# Patient Record
Sex: FEMALE | Race: WHITE | NOT HISPANIC OR LATINO | Employment: PART TIME | URBAN - METROPOLITAN AREA
[De-identification: names, ages, dates, MRNs, and addresses within clinical notes are randomized per-mention and may not be internally consistent; named-entity substitution may affect disease eponyms.]

---

## 2016-02-04 LAB
CREAT SERPL-MCNC: 0.87 MG/DL (ref 0.6–1.1)
GFR SERPL CREATININE-BSD FRML MDRD: 86 ML/MIN/1.73
GLUCOSE SERPL-MCNC: 90 MG/DL (ref 70–99)
POTASSIUM SERPL-SCNC: 4.2 MMOL/L (ref 3.5–5.1)

## 2017-02-14 LAB
PAP SMEAR - HIM PATIENT REPORTED: NEGATIVE
TSH SERPL-ACNC: 0.62 ULU/ML (ref 0.45–4.5)

## 2017-02-28 ENCOUNTER — TRANSFERRED RECORDS (OUTPATIENT)
Dept: HEALTH INFORMATION MANAGEMENT | Facility: CLINIC | Age: 37
End: 2017-02-28

## 2017-03-08 RX ORDER — BUTALBITAL, ACETAMINOPHEN, CAFFEINE AND CODEINE PHOSPHATE 300; 50; 40; 30 MG/1; MG/1; MG/1; MG/1
1-2 CAPSULE ORAL EVERY 4 HOURS PRN
Status: ON HOLD | COMMUNITY
End: 2017-03-09

## 2017-03-09 ENCOUNTER — HOSPITAL ENCOUNTER (OUTPATIENT)
Facility: CLINIC | Age: 37
Discharge: HOME | End: 2017-03-09
Attending: OBSTETRICS & GYNECOLOGY | Admitting: OBSTETRICS & GYNECOLOGY
Payer: COMMERCIAL

## 2017-03-09 ENCOUNTER — SURGERY (OUTPATIENT)
Age: 37
End: 2017-03-09

## 2017-03-09 ENCOUNTER — ANESTHESIA (OUTPATIENT)
Dept: SURGERY | Facility: CLINIC | Age: 37
End: 2017-03-09
Payer: COMMERCIAL

## 2017-03-09 ENCOUNTER — ANESTHESIA EVENT (OUTPATIENT)
Dept: SURGERY | Facility: CLINIC | Age: 37
End: 2017-03-09
Payer: COMMERCIAL

## 2017-03-09 VITALS
RESPIRATION RATE: 14 BRPM | DIASTOLIC BLOOD PRESSURE: 82 MMHG | HEART RATE: 77 BPM | WEIGHT: 166.1 LBS | BODY MASS INDEX: 25.17 KG/M2 | TEMPERATURE: 98.1 F | HEIGHT: 68 IN | OXYGEN SATURATION: 94 % | SYSTOLIC BLOOD PRESSURE: 129 MMHG

## 2017-03-09 DIAGNOSIS — Z98.890 STATUS POST HYSTEROSCOPIC ABLATION OF ENDOMETRIUM: Primary | ICD-10-CM

## 2017-03-09 LAB — HCG SERPL QL: NEGATIVE

## 2017-03-09 PROCEDURE — 25000132 ZZH RX MED GY IP 250 OP 250 PS 637: Performed by: OBSTETRICS & GYNECOLOGY

## 2017-03-09 PROCEDURE — 25000125 ZZHC RX 250: Performed by: ANESTHESIOLOGY

## 2017-03-09 PROCEDURE — 25000128 H RX IP 250 OP 636: Performed by: ANESTHESIOLOGY

## 2017-03-09 PROCEDURE — 88305 TISSUE EXAM BY PATHOLOGIST: CPT | Mod: 26 | Performed by: OBSTETRICS & GYNECOLOGY

## 2017-03-09 PROCEDURE — 25000566 ZZH SEVOFLURANE, EA 15 MIN: Performed by: OBSTETRICS & GYNECOLOGY

## 2017-03-09 PROCEDURE — 27210794 ZZH OR GENERAL SUPPLY STERILE: Performed by: OBSTETRICS & GYNECOLOGY

## 2017-03-09 PROCEDURE — 40000169 ZZH STATISTIC PRE-PROCEDURE ASSESSMENT I: Performed by: OBSTETRICS & GYNECOLOGY

## 2017-03-09 PROCEDURE — 25800025 ZZH RX 258: Performed by: ANESTHESIOLOGY

## 2017-03-09 PROCEDURE — 71000012 ZZH RECOVERY PHASE 1 LEVEL 1 FIRST HR: Performed by: OBSTETRICS & GYNECOLOGY

## 2017-03-09 PROCEDURE — 84703 CHORIONIC GONADOTROPIN ASSAY: CPT | Performed by: OBSTETRICS & GYNECOLOGY

## 2017-03-09 PROCEDURE — 25800025 ZZH RX 258: Performed by: OBSTETRICS & GYNECOLOGY

## 2017-03-09 PROCEDURE — 37000009 ZZH ANESTHESIA TECHNICAL FEE, EACH ADDTL 15 MIN: Performed by: OBSTETRICS & GYNECOLOGY

## 2017-03-09 PROCEDURE — 36415 COLL VENOUS BLD VENIPUNCTURE: CPT | Performed by: OBSTETRICS & GYNECOLOGY

## 2017-03-09 PROCEDURE — 88305 TISSUE EXAM BY PATHOLOGIST: CPT | Performed by: OBSTETRICS & GYNECOLOGY

## 2017-03-09 PROCEDURE — 71000027 ZZH RECOVERY PHASE 2 EACH 15 MINS: Performed by: OBSTETRICS & GYNECOLOGY

## 2017-03-09 PROCEDURE — 36000056 ZZH SURGERY LEVEL 3 1ST 30 MIN: Performed by: OBSTETRICS & GYNECOLOGY

## 2017-03-09 PROCEDURE — 71000013 ZZH RECOVERY PHASE 1 LEVEL 1 EA ADDTL HR: Performed by: OBSTETRICS & GYNECOLOGY

## 2017-03-09 PROCEDURE — 36000058 ZZH SURGERY LEVEL 3 EA 15 ADDTL MIN: Performed by: OBSTETRICS & GYNECOLOGY

## 2017-03-09 PROCEDURE — C1888 ENDOVAS NON-CARDIAC ABL CATH: HCPCS | Performed by: OBSTETRICS & GYNECOLOGY

## 2017-03-09 PROCEDURE — 37000008 ZZH ANESTHESIA TECHNICAL FEE, 1ST 30 MIN: Performed by: OBSTETRICS & GYNECOLOGY

## 2017-03-09 PROCEDURE — 25000125 ZZHC RX 250: Performed by: NURSE ANESTHETIST, CERTIFIED REGISTERED

## 2017-03-09 PROCEDURE — 25000128 H RX IP 250 OP 636: Performed by: NURSE ANESTHETIST, CERTIFIED REGISTERED

## 2017-03-09 RX ORDER — ONDANSETRON 4 MG/1
4 TABLET, ORALLY DISINTEGRATING ORAL EVERY 30 MIN PRN
Status: DISCONTINUED | OUTPATIENT
Start: 2017-03-09 | End: 2017-03-09 | Stop reason: HOSPADM

## 2017-03-09 RX ORDER — NALOXONE HYDROCHLORIDE 0.4 MG/ML
.1-.4 INJECTION, SOLUTION INTRAMUSCULAR; INTRAVENOUS; SUBCUTANEOUS
Status: DISCONTINUED | OUTPATIENT
Start: 2017-03-09 | End: 2017-03-09 | Stop reason: HOSPADM

## 2017-03-09 RX ORDER — ACETAMINOPHEN 325 MG/1
650 TABLET ORAL
Status: DISCONTINUED | OUTPATIENT
Start: 2017-03-09 | End: 2017-03-09 | Stop reason: HOSPADM

## 2017-03-09 RX ORDER — FENTANYL CITRATE 50 UG/ML
25-50 INJECTION, SOLUTION INTRAMUSCULAR; INTRAVENOUS
Status: DISCONTINUED | OUTPATIENT
Start: 2017-03-09 | End: 2017-03-09 | Stop reason: HOSPADM

## 2017-03-09 RX ORDER — PROPOFOL 10 MG/ML
INJECTION, EMULSION INTRAVENOUS PRN
Status: DISCONTINUED | OUTPATIENT
Start: 2017-03-09 | End: 2017-03-09

## 2017-03-09 RX ORDER — OXYCODONE HYDROCHLORIDE 5 MG/1
5-10 TABLET ORAL
Status: COMPLETED | OUTPATIENT
Start: 2017-03-09 | End: 2017-03-09

## 2017-03-09 RX ORDER — DEXAMETHASONE SODIUM PHOSPHATE 4 MG/ML
INJECTION, SOLUTION INTRA-ARTICULAR; INTRALESIONAL; INTRAMUSCULAR; INTRAVENOUS; SOFT TISSUE PRN
Status: DISCONTINUED | OUTPATIENT
Start: 2017-03-09 | End: 2017-03-09

## 2017-03-09 RX ORDER — HYDROMORPHONE HYDROCHLORIDE 1 MG/ML
.3-.5 INJECTION, SOLUTION INTRAMUSCULAR; INTRAVENOUS; SUBCUTANEOUS EVERY 10 MIN PRN
Status: DISCONTINUED | OUTPATIENT
Start: 2017-03-09 | End: 2017-03-09 | Stop reason: HOSPADM

## 2017-03-09 RX ORDER — MEPERIDINE HYDROCHLORIDE 25 MG/ML
12.5 INJECTION INTRAMUSCULAR; INTRAVENOUS; SUBCUTANEOUS
Status: DISCONTINUED | OUTPATIENT
Start: 2017-03-09 | End: 2017-03-09 | Stop reason: HOSPADM

## 2017-03-09 RX ORDER — PROPOFOL 10 MG/ML
INJECTION, EMULSION INTRAVENOUS CONTINUOUS PRN
Status: DISCONTINUED | OUTPATIENT
Start: 2017-03-09 | End: 2017-03-09

## 2017-03-09 RX ORDER — KETOROLAC TROMETHAMINE 30 MG/ML
INJECTION, SOLUTION INTRAMUSCULAR; INTRAVENOUS PRN
Status: DISCONTINUED | OUTPATIENT
Start: 2017-03-09 | End: 2017-03-09

## 2017-03-09 RX ORDER — SODIUM CHLORIDE, SODIUM LACTATE, POTASSIUM CHLORIDE, CALCIUM CHLORIDE 600; 310; 30; 20 MG/100ML; MG/100ML; MG/100ML; MG/100ML
INJECTION, SOLUTION INTRAVENOUS CONTINUOUS
Status: DISCONTINUED | OUTPATIENT
Start: 2017-03-09 | End: 2017-03-09 | Stop reason: HOSPADM

## 2017-03-09 RX ORDER — ONDANSETRON 2 MG/ML
INJECTION INTRAMUSCULAR; INTRAVENOUS PRN
Status: DISCONTINUED | OUTPATIENT
Start: 2017-03-09 | End: 2017-03-09

## 2017-03-09 RX ORDER — FENTANYL CITRATE 50 UG/ML
INJECTION, SOLUTION INTRAMUSCULAR; INTRAVENOUS PRN
Status: DISCONTINUED | OUTPATIENT
Start: 2017-03-09 | End: 2017-03-09

## 2017-03-09 RX ORDER — OXYCODONE HYDROCHLORIDE 5 MG/1
5-10 TABLET ORAL
Qty: 10 TABLET | Refills: 0 | Status: SHIPPED | OUTPATIENT
Start: 2017-03-09 | End: 2017-03-13

## 2017-03-09 RX ORDER — LIDOCAINE HYDROCHLORIDE 20 MG/ML
INJECTION, SOLUTION INFILTRATION; PERINEURAL PRN
Status: DISCONTINUED | OUTPATIENT
Start: 2017-03-09 | End: 2017-03-09

## 2017-03-09 RX ORDER — ONDANSETRON 2 MG/ML
4 INJECTION INTRAMUSCULAR; INTRAVENOUS EVERY 30 MIN PRN
Status: DISCONTINUED | OUTPATIENT
Start: 2017-03-09 | End: 2017-03-09 | Stop reason: HOSPADM

## 2017-03-09 RX ADMIN — SODIUM CHLORIDE, POTASSIUM CHLORIDE, SODIUM LACTATE AND CALCIUM CHLORIDE: 600; 310; 30; 20 INJECTION, SOLUTION INTRAVENOUS at 07:30

## 2017-03-09 RX ADMIN — LIDOCAINE HYDROCHLORIDE 60 MG: 20 INJECTION, SOLUTION INFILTRATION; PERINEURAL at 07:42

## 2017-03-09 RX ADMIN — MIDAZOLAM HYDROCHLORIDE 2 MG: 1 INJECTION, SOLUTION INTRAMUSCULAR; INTRAVENOUS at 07:31

## 2017-03-09 RX ADMIN — HYDROMORPHONE HYDROCHLORIDE 0.2 MG: 1 INJECTION, SOLUTION INTRAMUSCULAR; INTRAVENOUS; SUBCUTANEOUS at 09:07

## 2017-03-09 RX ADMIN — FENTANYL CITRATE 25 MCG: 50 INJECTION, SOLUTION INTRAMUSCULAR; INTRAVENOUS at 08:37

## 2017-03-09 RX ADMIN — HYDROMORPHONE HYDROCHLORIDE 0.5 MG: 1 INJECTION, SOLUTION INTRAMUSCULAR; INTRAVENOUS; SUBCUTANEOUS at 09:32

## 2017-03-09 RX ADMIN — DEXAMETHASONE SODIUM PHOSPHATE 4 MG: 4 INJECTION, SOLUTION INTRAMUSCULAR; INTRAVENOUS at 07:49

## 2017-03-09 RX ADMIN — SODIUM CHLORIDE 650 ML: 900 IRRIGANT IRRIGATION at 08:00

## 2017-03-09 RX ADMIN — FENTANYL CITRATE 25 MCG: 50 INJECTION, SOLUTION INTRAMUSCULAR; INTRAVENOUS at 07:37

## 2017-03-09 RX ADMIN — PROPOFOL 200 MG: 10 INJECTION, EMULSION INTRAVENOUS at 07:37

## 2017-03-09 RX ADMIN — PROPOFOL 200 MCG/KG/MIN: 10 INJECTION, EMULSION INTRAVENOUS at 07:40

## 2017-03-09 RX ADMIN — ONDANSETRON 4 MG: 2 INJECTION INTRAMUSCULAR; INTRAVENOUS at 07:55

## 2017-03-09 RX ADMIN — FENTANYL CITRATE 75 MCG: 50 INJECTION, SOLUTION INTRAMUSCULAR; INTRAVENOUS at 07:42

## 2017-03-09 RX ADMIN — OXYCODONE HYDROCHLORIDE 5 MG: 5 TABLET ORAL at 10:09

## 2017-03-09 RX ADMIN — PHENYLEPHRINE HYDROCHLORIDE 100 MCG: 10 INJECTION, SOLUTION INTRAMUSCULAR; INTRAVENOUS; SUBCUTANEOUS at 07:50

## 2017-03-09 RX ADMIN — HYDROMORPHONE HYDROCHLORIDE 0.5 MG: 1 INJECTION, SOLUTION INTRAMUSCULAR; INTRAVENOUS; SUBCUTANEOUS at 10:00

## 2017-03-09 RX ADMIN — FENTANYL CITRATE 50 MCG: 50 INJECTION, SOLUTION INTRAMUSCULAR; INTRAVENOUS at 08:45

## 2017-03-09 RX ADMIN — FENTANYL CITRATE 25 MCG: 50 INJECTION, SOLUTION INTRAMUSCULAR; INTRAVENOUS at 08:57

## 2017-03-09 RX ADMIN — HYDROMORPHONE HYDROCHLORIDE 0.3 MG: 1 INJECTION, SOLUTION INTRAMUSCULAR; INTRAVENOUS; SUBCUTANEOUS at 09:01

## 2017-03-09 RX ADMIN — LIDOCAINE HYDROCHLORIDE 40 MG: 20 INJECTION, SOLUTION INFILTRATION; PERINEURAL at 07:37

## 2017-03-09 RX ADMIN — KETOROLAC TROMETHAMINE 30 MG: 30 INJECTION, SOLUTION INTRAMUSCULAR at 08:08

## 2017-03-09 NOTE — DISCHARGE INSTRUCTIONS
While you were at the hospital today you received Toradol, an antiinflammatory medication similar to Ibuprofen.  You should not take other antiinflammatory medication, such as Ibuprofen, Motrin, Advil, Aleve, Naprosyn, etc, until 2pm.     Same Day Surgery Discharge Instructions for  Sedation and General Anesthesia       It's not unusual to feel dizzy, light-headed or faint for up to 24 hours after surgery or while taking pain medication.  If you have these symptoms: sit for a few minutes before standing and have someone assist you when you get up to walk or use the bathroom.      You should rest and relax for the next 24 hours. We recommend you make arrangements to have an adult stay with you for at least 24 hours after your discharge.  Avoid hazardous and strenuous activity.      DO NOT DRIVE any vehicle or operate mechanical equipment for 24 hours following the end of your surgery.  Even though you may feel normal, your reactions may be affected by the medication you have received.      Do not drink alcoholic beverages for 24 hours following surgery.       Slowly progress to your regular diet as you feel able. It's not unusual to feel nauseated and/or vomit after receiving anesthesia.  If you develop these symptoms, drink clear liquids (apple juice, ginger ale, broth, 7-up, etc. ) until you feel better.  If your nausea and vomiting persists for 24 hours, please notify your surgeon.        All narcotic pain medications, along with inactivity and anesthesia, can cause constipation. Drinking plenty of liquids and increasing fiber intake will help.      For any questions of a medical nature, call your surgeon.      Do not make important decisions for 24 hours.      If you had general anesthesia, you may have a sore throat for a couple of days related to the breathing tube used during surgery.  You may use Cepacol lozenges to help with this discomfort.  If it worsens or if you develop a fever, contact your surgeon.        If you feel your pain is not well managed with the pain medications prescribed by your surgeon, please contact your surgeon's office to let them know so they can address your concerns.       HOME CARE FOLLOWING HYSTEROSCOPY    Diet  You have no restrictions on your diet.  During the evening following surgery, drink plenty of fluids and eat a light supper.    Nausea  The anesthesia may produce some nausea.  If you feel nauseated, stay in bed and try drinking fluids such as 7-Up, tea, or soup.    Discomfort  The amount of discomfort you can expect is very unpredictable.  If you have pain that cannot be controlled with Tylenol or with the prescription you may have received, you should notify your physician.  Abdominal cramping or low backache is not uncommon and should not be a cause for concern.  You will be drowsy and weak the day of surgery and possibly the following day.  Fever  A low grade fever (not over 100 F) is usual after this procedure.  Do not hesitate to notify your physician if your fever seems excessive or persists.    Activity   You may resume your normal activity.  Avoid heavy lifting for one week.  You may bathe or shower.  Do not douche, use tampons, or resume intercourse until the bleeding has ceased.    Emergency Care  Contact your physician if you have any of these problems:   1.  A fever over 100 F   2.  A large amount of bleeding or drainage   3.  Severe pain       [facility]  D&C  Discharge Instructions    ACTIVITY:  You may restart normal activities as your abdominal discomfort disappears.  You may expect some discomfort under the ribs and shoulder area for the first 24 hours.  In nearly all cases, this will disappear shortly after the first day.  It is certainly permissible to climb stairs, shower, and do ordinary, quiet activities.  More vigorous activities such as sports, intercourse and work may be resumed in 48-72 hours as seems to befit your situation.    OFFICE  VISIT:  Please call a day or two after your surgery to make an appointment in approximately 2 weeks to discuss the results of your surgery and have your check-up.    VAGINAL DISCHARGE:  You may have some bloody vaginal discharge for as long as one week.  Ordinary tampons may be used after 3-4 days.  Avoid super absorbent tampons.    TEMPERATURE:  If you develop a temperature elevation of 101 F or higher, please call our office immediately.    RESTRICTIONS:  Due to the effects of general anesthesia, please do not drive a car, drink alcoholic beverages, nor operate complex machinery in the first 24 hours following surgery.    PLEASE FEEL FREE TO CALL OUR OFFICE IF ANY QUESTIONS OR PROBLEMS ARISE.    [facility]    Maximilian Little M.D.  Junior Shannon M.D.   Cedric Krishnamurthy M.D.  GAVIOTA Peña M.D.   Litzy Singh M.D.  Kasandra Shea M.D.  SUSAN Ramos M.D.   GAVIOTA Srivastava M.D.  _________________________________________________________________________________                   [address]  [address]            [facility] [address]  [place] [address]            [phone] (Telephone)                                               [phone] (Telephone)            [phone] (Fax)  [phone] (Fax)            [facility]      **If you have questions or concerns about your procedure,   call Dr. House at [phone]**

## 2017-03-09 NOTE — ANESTHESIA CARE TRANSFER NOTE
Patient: Vikki Merlos    Procedure(s):  HYSTEROSCOPY, DILATION AND CURETTAGE WITH NOVASURE ABLATION  - Wound Class: II-Clean Contaminated    Diagnosis: ABNORMAL UTERINE BLEEDING   Diagnosis Additional Information: No value filed.    Anesthesia Type:   General, LMA     Note:  Airway :Face Mask  Patient transferred to:PACU  Comments: Sleepy      Vitals: (Last set prior to Anesthesia Care Transfer)    CRNA VITALS  3/9/2017 0757 - 3/9/2017 0833      3/9/2017             Pulse: 77    Ht Rate: 73    SpO2: 100 %    Resp Rate (observed): 8                Electronically Signed By: FIONA Diamond CRNA  March 9, 2017  8:33 AM

## 2017-03-09 NOTE — IP AVS SNAPSHOT
MRN:[id number]                      After Visit Summary   3/9/2017    Vikki Merlos    MRN: [id number]           Thank you!     Thank you for choosing [facility] for your care. Our goal is always to provide you with excellent care. Hearing back from our patients is one way we can continue to improve our services. Please take a few minutes to complete the written survey that you may receive in the mail after you visit with us. Thank you!        Patient Information     Date Of Birth          1980        About your hospital stay     You were admitted on:  March 9, 2017 [facility]    You were discharged on:  March 9, 2017       Who to Call     For medical emergencies, please call 911.  For non-urgent questions about your medical care, please call your primary care provider or clinic, [phone]  For questions related to your surgery, please call your surgery clinic        Attending Provider     Provider Specialty    Caitlin House MD OB/Gyn       Primary Care Provider Office Phone # Fax #    Caitlin House [phone]       [facility] [address]  [address]  [facility] [address]        After Care Instructions     Discharge Instructions       Resume pre procedure diet            Discharge Instructions       Pelvic Rest. No tampons, douching or intercourse for  3 days            Discharge Instructions       Patient to arrange follow up appointment in 2  weeks            No alcohol       NO ALCOHOL for 24 hours post procedure            No driving or operating machinery       No driving or operating machinery until day after procedure                  Further instructions from your care team       While you were at the hospital today you received Toradol, an antiinflammatory medication similar to Ibuprofen.  You should not take other antiinflammatory medication, such as Ibuprofen, Motrin, Advil, Aleve,  Naprosyn, etc, until 2pm.     Same Day Surgery Discharge Instructions for  Sedation and General Anesthesia       It's not unusual to feel dizzy, light-headed or faint for up to 24 hours after surgery or while taking pain medication.  If you have these symptoms: sit for a few minutes before standing and have someone assist you when you get up to walk or use the bathroom.      You should rest and relax for the next 24 hours. We recommend you make arrangements to have an adult stay with you for at least 24 hours after your discharge.  Avoid hazardous and strenuous activity.      DO NOT DRIVE any vehicle or operate mechanical equipment for 24 hours following the end of your surgery.  Even though you may feel normal, your reactions may be affected by the medication you have received.      Do not drink alcoholic beverages for 24 hours following surgery.       Slowly progress to your regular diet as you feel able. It's not unusual to feel nauseated and/or vomit after receiving anesthesia.  If you develop these symptoms, drink clear liquids (apple juice, ginger ale, broth, 7-up, etc. ) until you feel better.  If your nausea and vomiting persists for 24 hours, please notify your surgeon.        All narcotic pain medications, along with inactivity and anesthesia, can cause constipation. Drinking plenty of liquids and increasing fiber intake will help.      For any questions of a medical nature, call your surgeon.      Do not make important decisions for 24 hours.      If you had general anesthesia, you may have a sore throat for a couple of days related to the breathing tube used during surgery.  You may use Cepacol lozenges to help with this discomfort.  If it worsens or if you develop a fever, contact your surgeon.       If you feel your pain is not well managed with the pain medications prescribed by your surgeon, please contact your surgeon's office to let them know so they can address your concerns.       HOME CARE  FOLLOWING HYSTEROSCOPY    Diet  You have no restrictions on your diet.  During the evening following surgery, drink plenty of fluids and eat a light supper.    Nausea  The anesthesia may produce some nausea.  If you feel nauseated, stay in bed and try drinking fluids such as 7-Up, tea, or soup.    Discomfort  The amount of discomfort you can expect is very unpredictable.  If you have pain that cannot be controlled with Tylenol or with the prescription you may have received, you should notify your physician.  Abdominal cramping or low backache is not uncommon and should not be a cause for concern.  You will be drowsy and weak the day of surgery and possibly the following day.  Fever  A low grade fever (not over 100 F) is usual after this procedure.  Do not hesitate to notify your physician if your fever seems excessive or persists.    Activity   You may resume your normal activity.  Avoid heavy lifting for one week.  You may bathe or shower.  Do not douche, use tampons, or resume intercourse until the bleeding has ceased.    Emergency Care  Contact your physician if you have any of these problems:   1.  A fever over 100 F   2.  A large amount of bleeding or drainage   3.  Severe pain       [facility]  D&C  Discharge Instructions    ACTIVITY:  You may restart normal activities as your abdominal discomfort disappears.  You may expect some discomfort under the ribs and shoulder area for the first 24 hours.  In nearly all cases, this will disappear shortly after the first day.  It is certainly permissible to climb stairs, shower, and do ordinary, quiet activities.  More vigorous activities such as sports, intercourse and work may be resumed in 48-72 hours as seems to befit your situation.    OFFICE VISIT:  Please call a day or two after your surgery to make an appointment in approximately 2 weeks to discuss the results of your surgery and have your check-up.    VAGINAL DISCHARGE:  You may have some  "bloody vaginal discharge for as long as one week.  Ordinary tampons may be used after 3-4 days.  Avoid super absorbent tampons.    TEMPERATURE:  If you develop a temperature elevation of 101 F or higher, please call our office immediately.    RESTRICTIONS:  Due to the effects of general anesthesia, please do not drive a car, drink alcoholic beverages, nor operate complex machinery in the first 24 hours following surgery.    PLEASE FEEL FREE TO CALL OUR OFFICE IF ANY QUESTIONS OR PROBLEMS ARISE.    [facility]    Maximilian Little M.D.  Junior Shannon M.D.   Cedric Krishnamurthy M.D.  GAVIOTA Peña M.D.   Litzy Singh M.D.  Kasandra Shea M.D.  SUSAN Ramos M.D.   GAVIOTA Srivastava M.D.  _________________________________________________________________________________                   [facility]              [address] [address]  [address]            [facility] [address]  [place] [address]            [phone] (Telephone)                                               [phone] (Telephone)            [phone] (Fax)  [phone] (Fax)            [facility]      **If you have questions or concerns about your procedure,   call Dr. House at [phone]**    Pending Results     Date and Time Order Name Status Description    3/9/2017 0803 Surgical pathology exam In process             Admission Information     Date & Time Provider Department Dept. Phone    3/9/2017 Caitlin House MD [facility] Same Day Surgery [phone]      Your Vitals Were     Blood Pressure Pulse Temperature Respirations Height Weight    120/83 77 96.5  F (35.8  C) (Temporal) 14 1.727 m (5' 8\") 75.3 kg (166 lb 1.6 oz)    Last Period Pulse Oximetry BMI (Body " "Mass Index)             2017 97% 25.26 kg/m2         [employer] Information     [employer] lets you send messages to your doctor, view your test results, renew your prescriptions, schedule appointments and more. To sign up, go to [email or web address] . Click on \"Log in\" on the left side of the screen, which will take you to the Welcome page. Then click on \"Sign up Now\" on the right side of the page.     You will be asked to enter the access code listed below, as well as some personal information. Please follow the directions to create your username and password.     Your access code is: [email or web address]  Expires: 2017  8:51 AM     Your access code will  in 90 days. If you need help or a new code, please call your [facility] clinic or [phone].        Care EveryWhere ID     This is your Care EveryWhere ID. This could be used by other organizations to access your [facility] medical records  [id number]           Review of your medicines      START taking        Dose / Directions    oxyCODONE 5 MG IR tablet   Commonly known as:  ROXICODONE   Used for:  Status post hysteroscopic ablation of endometrium   Notes to Patient:  Had one tab at 10:10 AM        Dose:  5-10 mg   Take 1-2 tablets (5-10 mg) by mouth every 3 hours as needed for pain or other (Moderate to Severe)   Quantity:  10 tablet   Refills:  0         CONTINUE these medicines which have NOT CHANGED        Dose / Directions    ALEVE PO        Dose:  220 mg   Take 220 mg by mouth as needed for moderate pain   Refills:  0       IMITREX PO        Dose:  100 mg   Take 100 mg by mouth once as needed for migraine (MAY REPEAT IN 2 HOURS)   Refills:  0       TRAMADOL HCL PO        Dose:  50 mg   Take 50 mg by mouth every 6 hours as needed for moderate to severe pain   Refills:  0            Where to get your medicines      Some of these will need a paper prescription and others can be bought over the counter. Ask your nurse if you have questions.     Bring a " paper prescription for each of these medications     oxyCODONE 5 MG IR tablet                Protect others around you: Learn how to safely use, store and throw away your medicines at www.disposemymeds.org.             Medication List: This is a list of all your medications and when to take them. Check marks below indicate your daily home schedule. Keep this list as a reference.      Medications           Morning Afternoon Evening Bedtime As Needed    ALEVE PO   Take 220 mg by mouth as needed for moderate pain                                IMITREX PO   Take 100 mg by mouth once as needed for migraine (MAY REPEAT IN 2 HOURS)                                oxyCODONE 5 MG IR tablet   Commonly known as:  ROXICODONE   Take 1-2 tablets (5-10 mg) by mouth every 3 hours as needed for pain or other (Moderate to Severe)   Last time this was given:  5 mg on 3/9/2017 10:09 AM   Notes to Patient:  Had one tab at 10:10 AM                                TRAMADOL HCL PO   Take 50 mg by mouth every 6 hours as needed for moderate to severe pain

## 2017-03-09 NOTE — IP AVS SNAPSHOT
[facility] Same Day Surgery    [address]    [place] [address]    Phone:  [phone]    Fax:  [phone]                                       After Visit Summary   3/9/2017    Vikki Merlos    MRN: [id number]           After Visit Summary Signature Page     I have received my discharge instructions, and my questions have been answered. I have discussed any challenges I see with this plan with the nurse or doctor.    ..........................................................................................................................................  Patient/Patient Representative Signature      ..........................................................................................................................................  Patient Representative Print Name and Relationship to Patient    ..................................................               ................................................  Date                                            Time    ..........................................................................................................................................  Reviewed by Signature/Title    ...................................................              ..............................................  Date                                                            Time

## 2017-03-09 NOTE — ANESTHESIA POSTPROCEDURE EVALUATION
Patient: Vikki Merlos    Procedure(s):  HYSTEROSCOPY, DILATION AND CURETTAGE WITH NOVASURE ABLATION  - Wound Class: II-Clean Contaminated    Diagnosis:ABNORMAL UTERINE BLEEDING   Diagnosis Additional Information: No value filed.    Anesthesia Type:  General, LMA    Note:  Anesthesia Post Evaluation    Patient location during evaluation: PACU  Patient participation: Able to fully participate in evaluation  Level of consciousness: awake  Pain management: adequate  Airway patency: patent  Cardiovascular status: acceptable  Respiratory status: acceptable  Hydration status: acceptable  PONV: controlled     Anesthetic complications: None          Last vitals:  Vitals:    03/09/17 1000 03/09/17 1015 03/09/17 1045   BP: 120/83 120/69 129/82   Pulse:      Resp: 14 10 14   Temp:  36.7  C (98.1  F)    SpO2: 97% 99% 94%         Electronically Signed By: Mely Sullivan MD  March 9, 2017  5:15 PM

## 2017-03-09 NOTE — OP NOTE
DATE OF PROCEDURE:  03/09/2017      PREOPERATIVE DIAGNOSES:  Abnormal uterine bleeding.      POSTOPERATIVE DIAGNOSES:  Abnormal uterine bleeding.      PROCEDURES:  Hysteroscopy, dilation and curettage, NovaSure endometrial ablation.      SURGEON:  Caitlin House MD      ANESTHESIA:  General.      SURGICAL FINDINGS:   1.  Exam under anesthesia revealed a normal-sized anteverted uterus.   2.  The uterus sounded to 8.5 cm and the cervix length was 3.5 cm.   3.  Normal-appearing endometrial cavity by hysteroscopy.   4.  Cervix descended nicely to mid vagina.   5.  Endometrial ablation occurred for 1 minute and 5 seconds.      DESCRIPTION OF PROCEDURE:  Vikki Merlos was taken to the operating room and given a general anesthetic.  She was appropriately prepped and draped in the dorsal lithotomy position.  Exam under anesthesia revealed an anteverted uterus with no palpable adnexal masses.  Speculum was placed in the vagina, single-tooth tenaculum placed on the anterior lip of the cervix.  The uterus sounded to 8.5 cm, the cervical length was 3.5 cm.  The cervix was serially dilated to accommodate a diagnostic hysteroscope.  Normal saline was the intrauterine medium used.  Hysteroscopy revealed a normal endometrial cavity with no submucosal fibroids or endometrial polyps.  The hysteroscope was then removed and a diffuse endometrial curettage was performed with a small Banjo uterine curet.  The tissue was sent for permanent.  After the D&C, the NovaSure endometrial ablation unit was placed inside the uterine cavity and appropriately seated.  The patient passed the cavity integrity test and then endometrial ablation occurred for 1 minute and 5 seconds.  The unit was then removed and the hysteroscope was placed up inside the uterine cavity.  It revealed a nice uniform burn with no evidence of uterine perforation.  Procedure was then terminated.  Tenaculum and speculum were removed.  Adequate hemostasis was obtained.   Estimated blood loss was 2 mL.  Sponge and needle counts were reported as correct.  Drains, none.  Complications, none.  The patient tolerated the procedure well and was taken to the recovery room in good condition.         CAITLIN HOUSE MD             D: 2017 08:21   T: 2017 10:24   MT: TS      Name:     SHIRLEY CLAY   MRN:      [id number]        Account:        [id number]   :      1980           Procedure Date: 2017      Document: [id number]       cc: Caitlin House MD

## 2017-03-09 NOTE — BRIEF OP NOTE
[facility] Brief Operative Note    Pre-operative diagnosis: ABNORMAL UTERINE BLEEDING    Post-operative diagnosis same   Procedure: Procedure(s):  HYSTEROSCOPY, DILATION AND CURETTAGE WITH NOVASURE ABLATION  - Wound Class: II-Clean Contaminated   Surgeon(s): Surgeon(s) and Role:     * Caitlin House MD - Primary   Estimated blood loss: 2cc    Specimens:   ID Type Source Tests Collected by Time Destination   A : Endometrial Curettings Tissue Endometrium SURGICAL PATHOLOGY EXAM Caitlin House MD 3/9/2017  7:57 AM       Findings: AV uterus, sounded to 8.5cm; cvx length 3.5cm; normal cavity; ablation for 6hya2thl  Drains none  Complications none  Pt tolerated well./LBakerMD

## 2017-03-09 NOTE — ANESTHESIA PREPROCEDURE EVALUATION
Anesthesia Evaluation     . Pt has had prior anesthetic.     No history of anesthetic complications     ROS/MED HX    ENT/Pulmonary: Comment: URI resolving, nasal drip    (+), recent URI . .   (-) sleep apnea   Neurologic:     (+)migraines,     Cardiovascular:        (-) JHAVERI   METS/Exercise Tolerance:  >4 METS   Hematologic:         Musculoskeletal:         GI/Hepatic:        (-) GERD   Renal/Genitourinary:         Endo:         Psychiatric:     (+) psychiatric history anxiety      Infectious Disease:         Malignancy:         Other:               Physical Exam  Normal systems: dental    Airway   Mallampati: II  TM distance: >3 FB  Neck ROM: full    Dental     Cardiovascular   Rhythm and rate: regular      Pulmonary    breath sounds clear to auscultation                    Anesthesia Plan      History & Physical Review  History and physical reviewed and following examination; no interval change.    ASA Status:  2 .        Plan for General and LMA     Propofol infusion      Postoperative Care      Consents  Anesthetic plan, risks, benefits and alternatives discussed with:  Patient..                          .  Procedure: Procedure(s):  COMBINED DILATION AND CURETTAGE, HYSTEROSCOPY, ABLATE ENDOMETRIUM NOVASURE  Preop diagnosis: ABNORMAL UTERINE BLEEDING     No Known Allergies  Past Medical History   Diagnosis Date     Anemia      Anxiety      Cervical dysplasia      Constipation      Migraine      Pneumonia      current cold     Past Surgical History   Procedure Laterality Date     Laparoscopic cystectomy ovarian (benign) Right      1st trimester     Conization leep       Laparoscopy diagnostic (gyn)       WITH RIGHT SALPINGECTOMY, LAPAROSCOPY WITH JEM,      Dilation and curettage, hysteroscopy diagnostic, combined       Head & neck surgery       WISDOM TEETH EXTRACTED     Prior to Admission medications    Medication Sig Start Date End Date Taking? Authorizing Provider   Naproxen Sodium (ALEVE PO) Take 220 mg by  mouth as needed for moderate pain   Yes Reported, Patient   SUMAtriptan Succinate (IMITREX PO) Take 100 mg by mouth once as needed for migraine (MAY REPEAT IN 2 HOURS)   Yes Reported, Patient   TRAMADOL HCL PO Take 50 mg by mouth every 6 hours as needed for moderate to severe pain   Yes Reported, Patient     Current Facility-Administered Medications Ordered in Epic   Medication Dose Route Frequency Last Rate Last Dose     No Pre Procedure Antibiotic Needed  1 each As instructed Continuous         No current [facility]-ordered outpatient prescriptions on file.     Wt Readings from Last 1 Encounters:   03/09/17 75.3 kg (166 lb 1.6 oz)     Temp Readings from Last 1 Encounters:   03/09/17 36.9  C (98.4  F)     BP Readings from Last 6 Encounters:   03/09/17 109/85     Pulse Readings from Last 4 Encounters:   03/09/17 77     Resp Readings from Last 1 Encounters:   03/09/17 16     SpO2 Readings from Last 1 Encounters:   03/09/17 98%     No results for input(s): NA, POTASSIUM, CHLORIDE, CO2, ANIONGAP, GLC, BUN, CR, STAN in the last 81927 hours.  No results for input(s): WBC, HGB, PLT in the last 06094 hours.  No results for input(s): INR in the last 82400 hours.    Invalid input(s): APTT   RECENT LABS:   ECG:   ECHO:   CXR:

## 2017-03-09 NOTE — INTERVAL H&P NOTE
See preop H&P.  35yo woman with menorrhagia and negative SIS presents for hysteroscopy, dilation and curettage, novasure endometrial ablation.  We have discussed all of her options.  She understands R/B/A, incluiding failure of thsi procedure and she desires to proceed.  UPT neg./LBakerMD

## 2017-03-10 LAB — COPATH REPORT: NORMAL

## 2017-03-13 ENCOUNTER — HOSPITAL ENCOUNTER (INPATIENT)
Facility: CLINIC | Age: 37
LOS: 3 days | Discharge: HOME | DRG: 872 | End: 2017-03-16
Attending: EMERGENCY MEDICINE | Admitting: OBSTETRICS & GYNECOLOGY
Payer: COMMERCIAL

## 2017-03-13 ENCOUNTER — APPOINTMENT (OUTPATIENT)
Dept: CT IMAGING | Facility: CLINIC | Age: 37
DRG: 872 | End: 2017-03-13
Attending: EMERGENCY MEDICINE
Payer: COMMERCIAL

## 2017-03-13 DIAGNOSIS — A41.9 SEPSIS, DUE TO UNSPECIFIED ORGANISM: ICD-10-CM

## 2017-03-13 DIAGNOSIS — R00.0 SINUS TACHYCARDIA: ICD-10-CM

## 2017-03-13 DIAGNOSIS — N39.0 URINARY TRACT INFECTION, SITE UNSPECIFIED: ICD-10-CM

## 2017-03-13 LAB
ALBUMIN SERPL-MCNC: 3.9 G/DL (ref 3.4–5)
ALBUMIN UR-MCNC: 30 MG/DL
ALP SERPL-CCNC: 119 U/L (ref 40–150)
ALT SERPL W P-5'-P-CCNC: 55 U/L (ref 0–50)
ANION GAP SERPL CALCULATED.3IONS-SCNC: 7 MMOL/L (ref 3–14)
APPEARANCE UR: ABNORMAL
AST SERPL W P-5'-P-CCNC: 43 U/L (ref 0–45)
BACTERIA #/AREA URNS HPF: ABNORMAL /HPF
BASOPHILS # BLD AUTO: 0 10E9/L (ref 0–0.2)
BASOPHILS NFR BLD AUTO: 0.3 %
BILIRUB SERPL-MCNC: 0.3 MG/DL (ref 0.2–1.3)
BILIRUB UR QL STRIP: NEGATIVE
BUN SERPL-MCNC: 11 MG/DL (ref 7–30)
CALCIUM SERPL-MCNC: 9.2 MG/DL (ref 8.5–10.1)
CHLORIDE SERPL-SCNC: 102 MMOL/L (ref 94–109)
CO2 BLDCOV-SCNC: 30 MMOL/L (ref 21–28)
CO2 SERPL-SCNC: 29 MMOL/L (ref 20–32)
COLOR UR AUTO: YELLOW
CREAT SERPL-MCNC: 0.82 MG/DL (ref 0.52–1.04)
D DIMER PPP FEU-MCNC: 2.9 UG/ML FEU (ref 0–0.5)
DIFFERENTIAL METHOD BLD: NORMAL
EOSINOPHIL # BLD AUTO: 0 10E9/L (ref 0–0.7)
EOSINOPHIL NFR BLD AUTO: 0.1 %
ERYTHROCYTE [DISTWIDTH] IN BLOOD BY AUTOMATED COUNT: 13.3 % (ref 10–15)
FLUAV+FLUBV AG SPEC QL: NEGATIVE
FLUAV+FLUBV AG SPEC QL: NORMAL
GFR SERPL CREATININE-BSD FRML MDRD: 78 ML/MIN/1.7M2
GLUCOSE SERPL-MCNC: 96 MG/DL (ref 70–99)
GLUCOSE UR STRIP-MCNC: NEGATIVE MG/DL
HCT VFR BLD AUTO: 40.8 % (ref 35–47)
HGB BLD-MCNC: 13.5 G/DL (ref 11.7–15.7)
HGB UR QL STRIP: ABNORMAL
IMM GRANULOCYTES # BLD: 0 10E9/L (ref 0–0.4)
IMM GRANULOCYTES NFR BLD: 0.3 %
KETONES UR STRIP-MCNC: NEGATIVE MG/DL
LACTATE BLD-SCNC: 1.9 MMOL/L (ref 0.7–2.1)
LEUKOCYTE ESTERASE UR QL STRIP: ABNORMAL
LYMPHOCYTES # BLD AUTO: 1.1 10E9/L (ref 0.8–5.3)
LYMPHOCYTES NFR BLD AUTO: 13.8 %
MCH RBC QN AUTO: 30.5 PG (ref 26.5–33)
MCHC RBC AUTO-ENTMCNC: 33.1 G/DL (ref 31.5–36.5)
MCV RBC AUTO: 92 FL (ref 78–100)
MONOCYTES # BLD AUTO: 0.3 10E9/L (ref 0–1.3)
MONOCYTES NFR BLD AUTO: 3.5 %
MUCOUS THREADS #/AREA URNS LPF: PRESENT /LPF
NEUTROPHILS # BLD AUTO: 6.3 10E9/L (ref 1.6–8.3)
NEUTROPHILS NFR BLD AUTO: 82 %
NITRATE UR QL: NEGATIVE
NRBC # BLD AUTO: 0 10*3/UL
NRBC BLD AUTO-RTO: 0 /100
PCO2 BLDV: 58 MM HG (ref 40–50)
PH BLDV: 7.32 PH (ref 7.32–7.43)
PH UR STRIP: 6 PH (ref 5–7)
PLATELET # BLD AUTO: 224 10E9/L (ref 150–450)
PO2 BLDV: 23 MM HG (ref 25–47)
POTASSIUM SERPL-SCNC: 3.7 MMOL/L (ref 3.4–5.3)
PROT SERPL-MCNC: 8.2 G/DL (ref 6.8–8.8)
RBC # BLD AUTO: 4.42 10E12/L (ref 3.8–5.2)
RBC #/AREA URNS AUTO: 5 /HPF (ref 0–2)
SAO2 % BLDV FROM PO2: 33 %
SODIUM SERPL-SCNC: 138 MMOL/L (ref 133–144)
SP GR UR STRIP: 1.05 (ref 1–1.03)
SPECIMEN SOURCE: NORMAL
SQUAMOUS #/AREA URNS AUTO: 35 /HPF (ref 0–1)
URN SPEC COLLECT METH UR: ABNORMAL
UROBILINOGEN UR STRIP-MCNC: NORMAL MG/DL (ref 0–2)
WBC # BLD AUTO: 7.7 10E9/L (ref 4–11)
WBC #/AREA URNS AUTO: 128 /HPF (ref 0–2)
WBC CLUMPS #/AREA URNS HPF: PRESENT /HPF

## 2017-03-13 PROCEDURE — 12000000 ZZH R&B MED SURG/OB

## 2017-03-13 PROCEDURE — 25500064 ZZH RX 255 OP 636: Performed by: EMERGENCY MEDICINE

## 2017-03-13 PROCEDURE — 87800 DETECT AGNT MULT DNA DIREC: CPT | Performed by: EMERGENCY MEDICINE

## 2017-03-13 PROCEDURE — 87040 BLOOD CULTURE FOR BACTERIA: CPT | Performed by: EMERGENCY MEDICINE

## 2017-03-13 PROCEDURE — 96375 TX/PRO/DX INJ NEW DRUG ADDON: CPT

## 2017-03-13 PROCEDURE — 25000128 H RX IP 250 OP 636: Performed by: EMERGENCY MEDICINE

## 2017-03-13 PROCEDURE — 71260 CT THORAX DX C+: CPT

## 2017-03-13 PROCEDURE — 99285 EMERGENCY DEPT VISIT HI MDM: CPT | Mod: 25

## 2017-03-13 PROCEDURE — 25000128 H RX IP 250 OP 636: Performed by: OBSTETRICS & GYNECOLOGY

## 2017-03-13 PROCEDURE — 96365 THER/PROPH/DIAG IV INF INIT: CPT

## 2017-03-13 PROCEDURE — 25000128 H RX IP 250 OP 636

## 2017-03-13 PROCEDURE — 81001 URINALYSIS AUTO W/SCOPE: CPT | Performed by: EMERGENCY MEDICINE

## 2017-03-13 PROCEDURE — 82803 BLOOD GASES ANY COMBINATION: CPT

## 2017-03-13 PROCEDURE — 74177 CT ABD & PELVIS W/CONTRAST: CPT

## 2017-03-13 PROCEDURE — 96376 TX/PRO/DX INJ SAME DRUG ADON: CPT

## 2017-03-13 PROCEDURE — 25000125 ZZHC RX 250: Performed by: EMERGENCY MEDICINE

## 2017-03-13 PROCEDURE — 85025 COMPLETE CBC W/AUTO DIFF WBC: CPT | Performed by: EMERGENCY MEDICINE

## 2017-03-13 PROCEDURE — 83605 ASSAY OF LACTIC ACID: CPT

## 2017-03-13 PROCEDURE — 87804 INFLUENZA ASSAY W/OPTIC: CPT | Performed by: EMERGENCY MEDICINE

## 2017-03-13 PROCEDURE — 96361 HYDRATE IV INFUSION ADD-ON: CPT

## 2017-03-13 PROCEDURE — 87186 SC STD MICRODIL/AGAR DIL: CPT | Performed by: EMERGENCY MEDICINE

## 2017-03-13 PROCEDURE — 25000132 ZZH RX MED GY IP 250 OP 250 PS 637: Performed by: OBSTETRICS & GYNECOLOGY

## 2017-03-13 PROCEDURE — 80053 COMPREHEN METABOLIC PANEL: CPT | Performed by: EMERGENCY MEDICINE

## 2017-03-13 PROCEDURE — 87077 CULTURE AEROBIC IDENTIFY: CPT | Performed by: EMERGENCY MEDICINE

## 2017-03-13 PROCEDURE — 85379 FIBRIN DEGRADATION QUANT: CPT | Performed by: EMERGENCY MEDICINE

## 2017-03-13 RX ORDER — DIPHENHYDRAMINE HCL 25 MG
25 CAPSULE ORAL EVERY 6 HOURS PRN
Status: DISCONTINUED | OUTPATIENT
Start: 2017-03-13 | End: 2017-03-16 | Stop reason: HOSPADM

## 2017-03-13 RX ORDER — SODIUM CHLORIDE 9 MG/ML
INJECTION, SOLUTION INTRAVENOUS CONTINUOUS
Status: DISCONTINUED | OUTPATIENT
Start: 2017-03-13 | End: 2017-03-14

## 2017-03-13 RX ORDER — HYDROMORPHONE HYDROCHLORIDE 1 MG/ML
0.5 INJECTION, SOLUTION INTRAMUSCULAR; INTRAVENOUS; SUBCUTANEOUS ONCE
Status: COMPLETED | OUTPATIENT
Start: 2017-03-13 | End: 2017-03-13

## 2017-03-13 RX ORDER — ACETAMINOPHEN 325 MG/1
650 TABLET ORAL EVERY 4 HOURS PRN
Status: DISCONTINUED | OUTPATIENT
Start: 2017-03-16 | End: 2017-03-16 | Stop reason: HOSPADM

## 2017-03-13 RX ORDER — DIPHENHYDRAMINE HYDROCHLORIDE 50 MG/ML
25 INJECTION INTRAMUSCULAR; INTRAVENOUS EVERY 6 HOURS PRN
Status: DISCONTINUED | OUTPATIENT
Start: 2017-03-13 | End: 2017-03-16 | Stop reason: HOSPADM

## 2017-03-13 RX ORDER — HYDROMORPHONE HYDROCHLORIDE 1 MG/ML
INJECTION, SOLUTION INTRAMUSCULAR; INTRAVENOUS; SUBCUTANEOUS
Status: COMPLETED
Start: 2017-03-13 | End: 2017-03-13

## 2017-03-13 RX ORDER — SODIUM CHLORIDE 9 MG/ML
INJECTION, SOLUTION INTRAVENOUS CONTINUOUS
Status: DISCONTINUED | OUTPATIENT
Start: 2017-03-13 | End: 2017-03-16 | Stop reason: HOSPADM

## 2017-03-13 RX ORDER — IOPAMIDOL 755 MG/ML
85 INJECTION, SOLUTION INTRAVASCULAR ONCE
Status: COMPLETED | OUTPATIENT
Start: 2017-03-13 | End: 2017-03-13

## 2017-03-13 RX ORDER — DOCUSATE SODIUM 100 MG/1
100 CAPSULE, LIQUID FILLED ORAL 2 TIMES DAILY
Status: DISCONTINUED | OUTPATIENT
Start: 2017-03-13 | End: 2017-03-16 | Stop reason: HOSPADM

## 2017-03-13 RX ORDER — HYDROMORPHONE HYDROCHLORIDE 1 MG/ML
0.5 INJECTION, SOLUTION INTRAMUSCULAR; INTRAVENOUS; SUBCUTANEOUS
Status: COMPLETED | OUTPATIENT
Start: 2017-03-13 | End: 2017-03-13

## 2017-03-13 RX ORDER — LIDOCAINE 40 MG/G
CREAM TOPICAL
Status: DISCONTINUED | OUTPATIENT
Start: 2017-03-13 | End: 2017-03-16 | Stop reason: HOSPADM

## 2017-03-13 RX ORDER — IBUPROFEN 600 MG/1
600 TABLET, FILM COATED ORAL EVERY 6 HOURS PRN
Status: DISCONTINUED | OUTPATIENT
Start: 2017-03-13 | End: 2017-03-16 | Stop reason: HOSPADM

## 2017-03-13 RX ORDER — ONDANSETRON 2 MG/ML
4 INJECTION INTRAMUSCULAR; INTRAVENOUS
Status: COMPLETED | OUTPATIENT
Start: 2017-03-13 | End: 2017-03-13

## 2017-03-13 RX ORDER — ONDANSETRON 2 MG/ML
INJECTION INTRAMUSCULAR; INTRAVENOUS
Status: COMPLETED
Start: 2017-03-13 | End: 2017-03-13

## 2017-03-13 RX ORDER — ACETAMINOPHEN 325 MG/1
975 TABLET ORAL EVERY 8 HOURS
Status: DISCONTINUED | OUTPATIENT
Start: 2017-03-13 | End: 2017-03-16 | Stop reason: HOSPADM

## 2017-03-13 RX ORDER — OXYCODONE HYDROCHLORIDE 5 MG/1
5-10 TABLET ORAL
Status: DISCONTINUED | OUTPATIENT
Start: 2017-03-13 | End: 2017-03-14

## 2017-03-13 RX ORDER — IOPAMIDOL 755 MG/ML
65 INJECTION, SOLUTION INTRAVASCULAR ONCE
Status: DISCONTINUED | OUTPATIENT
Start: 2017-03-13 | End: 2017-03-13 | Stop reason: CLARIF

## 2017-03-13 RX ORDER — ONDANSETRON 2 MG/ML
4 INJECTION INTRAMUSCULAR; INTRAVENOUS EVERY 6 HOURS PRN
Status: DISCONTINUED | OUTPATIENT
Start: 2017-03-13 | End: 2017-03-16 | Stop reason: HOSPADM

## 2017-03-13 RX ORDER — DOXYCYCLINE 100 MG/1
100 CAPSULE ORAL 2 TIMES DAILY
Status: DISCONTINUED | OUTPATIENT
Start: 2017-03-13 | End: 2017-03-16 | Stop reason: HOSPADM

## 2017-03-13 RX ORDER — CEFTRIAXONE 1 G/1
1 INJECTION, POWDER, FOR SOLUTION INTRAMUSCULAR; INTRAVENOUS ONCE
Status: COMPLETED | OUTPATIENT
Start: 2017-03-13 | End: 2017-03-13

## 2017-03-13 RX ORDER — ONDANSETRON 4 MG/1
4 TABLET, ORALLY DISINTEGRATING ORAL EVERY 6 HOURS PRN
Status: DISCONTINUED | OUTPATIENT
Start: 2017-03-13 | End: 2017-03-16 | Stop reason: HOSPADM

## 2017-03-13 RX ORDER — DOXYCYCLINE 100 MG/1
100 CAPSULE ORAL 2 TIMES DAILY
Status: DISCONTINUED | OUTPATIENT
Start: 2017-03-13 | End: 2017-03-13

## 2017-03-13 RX ORDER — NALOXONE HYDROCHLORIDE 0.4 MG/ML
.1-.4 INJECTION, SOLUTION INTRAMUSCULAR; INTRAVENOUS; SUBCUTANEOUS
Status: DISCONTINUED | OUTPATIENT
Start: 2017-03-13 | End: 2017-03-16 | Stop reason: HOSPADM

## 2017-03-13 RX ADMIN — DOCUSATE SODIUM 100 MG: 100 CAPSULE, LIQUID FILLED ORAL at 22:29

## 2017-03-13 RX ADMIN — HYDROMORPHONE HYDROCHLORIDE 0.5 MG: 1 INJECTION, SOLUTION INTRAMUSCULAR; INTRAVENOUS; SUBCUTANEOUS at 17:20

## 2017-03-13 RX ADMIN — HYDROMORPHONE HYDROCHLORIDE 0.5 MG: 1 INJECTION, SOLUTION INTRAMUSCULAR; INTRAVENOUS; SUBCUTANEOUS at 18:54

## 2017-03-13 RX ADMIN — IOPAMIDOL 85 ML: 755 INJECTION, SOLUTION INTRAVENOUS at 18:02

## 2017-03-13 RX ADMIN — DOXYCYCLINE HYCLATE 100 MG: 100 CAPSULE ORAL at 22:29

## 2017-03-13 RX ADMIN — HYDROMORPHONE HYDROCHLORIDE 0.5 MG: 1 INJECTION, SOLUTION INTRAMUSCULAR; INTRAVENOUS; SUBCUTANEOUS at 15:54

## 2017-03-13 RX ADMIN — OXYCODONE HYDROCHLORIDE 5 MG: 5 TABLET ORAL at 22:29

## 2017-03-13 RX ADMIN — ONDANSETRON 4 MG: 2 SOLUTION INTRAMUSCULAR; INTRAVENOUS at 20:05

## 2017-03-13 RX ADMIN — SODIUM CHLORIDE: 9 INJECTION, SOLUTION INTRAVENOUS at 21:56

## 2017-03-13 RX ADMIN — ACETAMINOPHEN 975 MG: 325 TABLET, FILM COATED ORAL at 22:29

## 2017-03-13 RX ADMIN — ONDANSETRON 4 MG: 2 SOLUTION INTRAMUSCULAR; INTRAVENOUS at 15:54

## 2017-03-13 RX ADMIN — SODIUM CHLORIDE 66 ML: 9 INJECTION, SOLUTION INTRAVENOUS at 17:54

## 2017-03-13 RX ADMIN — CEFTRIAXONE 1 G: 1 INJECTION, POWDER, FOR SOLUTION INTRAMUSCULAR; INTRAVENOUS at 19:02

## 2017-03-13 RX ADMIN — SODIUM CHLORIDE 2301 ML: 9 INJECTION, SOLUTION INTRAVENOUS at 15:54

## 2017-03-13 RX ADMIN — SODIUM CHLORIDE: 9 INJECTION, SOLUTION INTRAVENOUS at 21:23

## 2017-03-13 ASSESSMENT — ENCOUNTER SYMPTOMS
NAUSEA: 1
COUGH: 1
PALPITATIONS: 1
DIFFICULTY URINATING: 0
VOMITING: 0
FEVER: 1
SHORTNESS OF BREATH: 0
DIZZINESS: 1
CHILLS: 1
ABDOMINAL PAIN: 1
DYSURIA: 0
FLANK PAIN: 0

## 2017-03-13 ASSESSMENT — ACTIVITIES OF DAILY LIVING (ADL)
FALL_HISTORY_WITHIN_LAST_SIX_MONTHS: NO
SWALLOWING: 0-->SWALLOWS FOODS/LIQUIDS WITHOUT DIFFICULTY
COGNITION: 0 - NO COGNITION ISSUES REPORTED
RETIRED_COMMUNICATION: 0-->UNDERSTANDS/COMMUNICATES WITHOUT DIFFICULTY
RETIRED_EATING: 0-->INDEPENDENT
BATHING: 0-->INDEPENDENT
DRESS: 0-->INDEPENDENT
TRANSFERRING: 0-->INDEPENDENT
TOILETING: 0-->INDEPENDENT
AMBULATION: 0-->INDEPENDENT

## 2017-03-13 NOTE — IP AVS SNAPSHOT
[facility] [address] [facility]    [address] [place] [address]    Phone:  [phone]                                       After Visit Summary   3/13/2017    Vikki Merlos    MRN: [id number]           After Visit Summary Signature Page     I have received my discharge instructions, and my questions have been answered. I have discussed any challenges I see with this plan with the nurse or doctor.    ..........................................................................................................................................  Patient/Patient Representative Signature      ..........................................................................................................................................  Patient Representative Print Name and Relationship to Patient    ..................................................               ................................................  Date                                            Time    ..........................................................................................................................................  Reviewed by Signature/Title    ...................................................              ..............................................  Date                                                            Time

## 2017-03-13 NOTE — IP AVS SNAPSHOT
MRN:[id number]                      After Visit Summary   3/13/2017    Vikki Merlos    MRN: [id number]           Thank you!     Thank you for choosing [facility] for your care. Our goal is always to provide you with excellent care. Hearing back from our patients is one way we can continue to improve our services. Please take a few minutes to complete the written survey that you may receive in the mail after you visit with us. Thank you!        Patient Information     Date Of Birth          1980        About your hospital stay     You were admitted on:  March 13, 2017 You last received care in the:  [facility] [address] [facility]    You were discharged on:  March 16, 2017       Who to Call     For medical emergencies, please call 911.  For non-urgent questions about your medical care, please call your primary care provider or clinic, [phone]          Attending Provider     Provider Specialty    Loraine Cohen MD Emergency Medicine    Caitlin House MD OB/Gyn       Primary Care Provider Office Phone # Fax #    Caitlin House [phone]       [facility] [address]  [address]  [facility] [address]        After Care Instructions     Activity       Nothing per vagina until cleared postoperatively            Diet       Follow this diet upon discharge: Regular                  Follow-up Appointments     Follow-up and recommended labs and tests        Dr. Caitlin House as scheduled for postoperative check                  Further instructions from your care team       Take probiotic daily for one month.    Pending Results     Date and Time Order Name Status Description    3/16/2017 0940 Clostridium difficile toxin B PCR In process     3/13/2017 1538 Blood culture Preliminary             Statement of Approval     Ordered          03/16/17 1105  I have reviewed and agree with all the recommendations and orders detailed in this document.   "EFFECTIVE NOW     Approved and electronically signed by:  Marci Tirado MD             Admission Information     Date & Time Provider Department Dept. Phone    3/13/2017 Caitlin House MD [facility] [address] [facility] [phone]      Your Vitals Were     Blood Pressure Pulse Temperature Respirations Height Weight    132/89 (BP Location: Right arm) 70 97.7  F (36.5  C) (Oral) 16 1.727 m (5' 8\") 86.1 kg (189 lb 13.1 oz)    Last Period Pulse Oximetry BMI (Body Mass Index)             2017 97% 28.86 kg/m2         MyChart Information     [employer] lets you send messages to your doctor, view your test results, renew your prescriptions, schedule appointments and more. To sign up, go to [email or web address] . Click on \"Log in\" on the left side of the screen, which will take you to the Welcome page. Then click on \"Sign up Now\" on the right side of the page.     You will be asked to enter the access code listed below, as well as some personal information. Please follow the directions to create your username and password.     Your access code is: [email or web address]  Expires: 2017  9:51 AM     Your access code will  in 90 days. If you need help or a new code, please call your [facility] clinic or [phone].        Care EveryWhere ID     This is your Care EveryWhere ID. This could be used by other organizations to access your [facility] medical records  [id number]           Review of your medicines      START taking        Dose / Directions    amoxicillin-clavulanate 875-125 MG per tablet   Commonly known as:  AUGMENTIN   Used for:  Sepsis, due to unspecified organism (H)   Notes to Patient:  Take approximately every 12 hours (such as 8am, 8pm)        Dose:  1 tablet   Take 1 tablet by mouth 2 times daily   Quantity:  28 tablet   Refills:  0       ciprofloxacin 500 MG tablet   Commonly known as:  CIPRO   Used for:  Sepsis, due to unspecified organism (H)   Notes to Patient:  Take " approximately every 12 hours (such as 8am, 8pm)        Dose:  500 mg   Take 1 tablet (500 mg) by mouth 2 times daily   Quantity:  28 tablet   Refills:  0         CONTINUE these medicines which have NOT CHANGED        Dose / Directions    ALEVE PO        Dose:  220 mg   Take 220 mg by mouth daily as needed for moderate pain   Refills:  0       BENADRYL PO        Dose:  25 mg   Take 25 mg by mouth daily as needed   Refills:  0       IMITREX PO        Dose:  100 mg   Take 100 mg by mouth once as needed for migraine (MAY REPEAT IN 2 HOURS)   Refills:  0       TRAMADOL HCL PO        Dose:   mg   Take  mg by mouth every 6 hours as needed for moderate to severe pain   Refills:  0            Where to get your medicines      Some of these will need a paper prescription and others can be bought over the counter. Ask your nurse if you have questions.     Bring a paper prescription for each of these medications     amoxicillin-clavulanate 875-125 MG per tablet    ciprofloxacin 500 MG tablet                Protect others around you: Learn how to safely use, store and throw away your medicines at www.disposemymeds.org.             Medication List: This is a list of all your medications and when to take them. Check marks below indicate your daily home schedule. Keep this list as a reference.      Medications           Morning Afternoon Evening Bedtime As Needed    ALEVE PO   Take 220 mg by mouth daily as needed for moderate pain                                   amoxicillin-clavulanate 875-125 MG per tablet   Commonly known as:  AUGMENTIN   Take 1 tablet by mouth 2 times daily   Next Dose Due:  Start at home   Notes to Patient:  Take approximately every 12 hours (such as 8am, 8pm)                                      BENADRYL PO   Take 25 mg by mouth daily as needed                                   ciprofloxacin 500 MG tablet   Commonly known as:  CIPRO   Take 1 tablet (500 mg) by mouth 2 times daily   Next Dose Due:   Start at home   Notes to Patient:  Take approximately every 12 hours (such as 8am, 8pm)                                      IMITREX PO   Take 100 mg by mouth once as needed for migraine (MAY REPEAT IN 2 HOURS)   Last time this was given:  100 mg on 3/15/2017  9:00 AM                                   TRAMADOL HCL PO   Take  mg by mouth every 6 hours as needed for moderate to severe pain   Last time this was given:  50 mg on 3/14/2017  5:41 PM

## 2017-03-13 NOTE — ED PROVIDER NOTES
History     Chief Complaint:  Generalized weakness    HPI   Vikki Merlos is a 36 year old female who presents with generalized weakness status post endometrial ablation. The patient recently underwent an endometrial ablation 4 days ago under Dr. House. She noted a cough prior to the surgery but says that she was otherwise feeling well. Saturday (2 days ago) she began having some chills, dizziness and nausea. Yesterday, she had a measured fever of 100.8 F.  She went to her clinic today and was seen by staff there. She had a pelvic exam performed which was told to be normal, and she has had no pelvic/vaginal pain or discharge. She was noted to have a hemoglobin of 10.7 and, given her recent surgery and cough, she was referred to the ED to be worked up for possible pulmonary embolism or pulmonary disease. She has not had any chest pain or shortness of breath. She primarily complains of a sensation of palpitations and dizziness, with nausea but no vomiting. She has had no recent sick contacts. She has not had any abdominal pain, leg swelling or pain. She has not had any urinary symptoms such as dysuria, hematuria.    PE/DVT RISK FACTORS:  Sex:    Female  Hormones:   No  Tobacco:   Never smoker  Cancer:   No  Travel:   Yes, end of January  Surgery:   Yes  Other immobilization: No  Personal history:  No  Family history:  No     Allergies:  No known drug allergies     Medications:    Aleve  Imitrex  Tramadol      Past Medical History:    Anemia  Anxiety  Cervical dysplasia  Migraine  Pneumonia     Past Surgical History:    Cystectomy ovarian  Conization LEEP  Right salpingectomy  D&C  Hoffmeister teeth  D&C hysteroscopy, ablate endometrium     Family History:    History reviewed. No pertinent family history.      Social History:  Smoking status: Never smoker  Alcohol use: No   Marital Status:       Review of Systems   Constitutional: Positive for chills and fever.   Respiratory: Positive for cough. Negative for  "shortness of breath.    Cardiovascular: Positive for palpitations. Negative for chest pain and leg swelling.   Gastrointestinal: Positive for abdominal pain and nausea. Negative for vomiting.   Genitourinary: Negative for difficulty urinating, dysuria, flank pain, pelvic pain, vaginal bleeding, vaginal discharge and vaginal pain.   Neurological: Positive for dizziness.   All other systems reviewed and are negative.      Physical Exam     Patient Vitals for the past 24 hrs:   BP Temp Temp src Heart Rate Resp SpO2 Height Weight   03/13/17 1730 127/80 - - 117 15 93 % - -   03/13/17 1715 - - - 124 15 100 % - -   03/13/17 1709 - 100.1  F (37.8  C) Oral - - - - -   03/13/17 1700 126/77 - - - - - - -   03/13/17 1630 123/80 - - - - 100 % - -   03/13/17 1600 98/54 - - - - 100 % - -   03/13/17 1530 - - - - - 100 % - -   03/13/17 1518 114/82 97.5  F (36.4  C) Oral 117 - 100 % 1.727 m (5' 8\") 76.7 kg (169 lb)   03/13/17 1515 (!) 133/108 - - - - 100 % - -        Physical Exam  Nursing note and vitals reviewed.  Constitutional:  Appears well-developed and well-nourished, comfortable.   HENT:   Head:   No evidence of facial or scalp trauma.  Nose:    Nose normal.   Mouth/Throat:  Mucosa is moist.  Eyes:    Conjunctivae are normal.      Pupils are equal, round, and reactive to light.      Right eye exhibits no discharge. Left eye exhibits no discharge.      No scleral icterus.   Cardiovascular:  Tachycardic rate, regular rhythm.      Normal heart sounds and intact distal pulses.       No murmur heard.  Pulmonary/Chest:  Effort normal and breath sounds normal. No respiratory distress.     No wheezes. No rales. No chest wall tenderness. No stridor.   Abdominal:   Soft. No distension and no mass. Diffuse lower abdominal/pelvic tenderness    with mild guarding. No rebound. No flank pain.  Musculoskeletal:  Normal range of motion.      No edema and no tenderness.                                       Neck supple, no midline cervical " tenderness.   Neurological:   Alert and oriented to person, place, and year.      No cranial nerve deficit.      Exhibits normal muscle tone. Coordination normal.      GCS eye subscore is 4. GCS verbal subscore is 5.      GCS motor subscore is 6.   Skin:    Skin is warm and dry. No rash noted. No diaphoresis.      No erythema. No pallor.   Psychiatric:   Behavior is normal. Judgment and thought content normal.     Emergency Department Course     Imaging:  Radiographic findings were communicated with the patient who voiced understanding of the findings.    CT-scan Chest/Abdomen/Pelvis w/ contrast:  1. No pulmonary embolus or other findings in the chest to suggest an  etiology of the patient's shortness of breath and tachycardia.  2. Normal CT of the abdomen and pelvis.  Preliminary result per radiology.      Laboratory:  D-dimer: 2.9 (H)  Influenza: Negative  CBC: WNL (WBC 7.7, HGB 13.5, )    CMP: ALT 55 (H), o/w WNL (Creatinine 0.82)   1613 - ISTAT Lactate: pH 7.32, pCO2 58 (H), pO2 23 (L), Bicarbonate 30 (H), Lactic Acid 1.9   UA: Spec gravity 1.050 (H), Moderate blood, Albumin 30, leukocyte esterase large,  (H), RBC 5 (H), WBC Clumps present, Few bacteria, Squamous Epithelial 35 (H), mucous present, o/w negative  Urine Culture: Pending  Blood Culture x2: Pending     Interventions:  1554 - Dilaudid 0.5 mg IV  1554 - Zofran 4 mg IV  1554 - NS 1L IV Bolus   1720 - Dilaudid 0.5 mg IV  1835 - NS  mL/hr   1902 - Rocephin 1g IVPB      Emergency Department Course:  Past medical records, nursing notes, and vitals reviewed.  1525: I performed an exam of the patient and obtained history, as documented above.    IV inserted and blood drawn.   The patient was sent for a CT-scan while in the emergency department, findings above.     I personally reviewed the laboratory results with the Patient and answered all related questions prior to discharge.     1844: I rechecked the patient. Findings and plan  explained to the Patient.     1953: I discussed the case with Dr. Mccarty of [facility], the patient's primary OB clinic. They agreed to admit the patient to a medical bed for further evaluation and treatment. Dr. Mccarty accepted the patient on behalf of Dr. House.    Impression & Plan      Medical Decision Making:  The patient comes in with low grade fever, chills, along with some tachycardia. She was sent from the clinic. She had the ablation and the vaginal exam at the clinic was unremarkable today. She does have some increase in pain after her exam today. Labs were obtained and a septic work up was started. Her lactic acid was normal at 1.9, comp panel is normal aside from a slightly elevated ALT of 55. Glucose was 96 and her CBC is normal with a white count of 7.7 with 82% neutrophils. Hemoglobin and platelets are normal. I did a influenza which was negative. Two blood cultures were ordered and a D-dimer was obtained because of the some increase in shortness of breath but also the tachycardia. Her pulse rate was 110 to 125. Her D-dimer was 2.9. At that point, with pain in her pelvic area along with the D-dimer elevation, I decided to do a CT PE study and also of the abdomen pelvis; this was per recommendation of Dr. House. The CT abdomen pelvis was normal with no free fluid or free air; unremarkable. The CT of the chest was also normal. There was no infiltrate and no blood clot. The heart is not enlarged and there is no fluid around the heart. We were able to get a urine after 3 liters of fluid and her urine was slightly cloudy; she had negative nitrite but 120 white cells, white cell clumps, and bacteria. There were a few squamous cells at 35. Cultures were ordered and it is certainly possible that she could have a UTI. It does not fully explain her tachycardia despite 3 liters of fluid. With the possible UTI, and low grade fever, she does have sepsis. Rocephin was ordered after I got the urine back; 1  gram IV. Because she is still tachycardic and required a couple doses of Dilaudid, I talked with Dr. Mccarty who is on call for Dr. House, she agreed the patient should come into the hospital overnight for fluids and to see how she does with the Rocephin. We will wait for urine culture and blood culture results. The patient is comfortable with this plan.     Disposition:  Admit to Dr. Mccarty for Dr. House tonight, fluids, Rocephin. Awaiting cultures including urine. Pain medicine as needed. Reevaluation by GYN in the morning.     Diagnosis:    ICD-10-CM    1. Sepsis, due to unspecified organism (H) A41.9 Blood culture   2. Urinary tract infection, site unspecified N39.0    3. Sinus tachycardia R00.0        Duane Duckworth  3/13/2017    EMERGENCY DEPARTMENT  I, Duane Duckworth, am serving as a scribe at 3:25 PM on 3/13/2017 to document services personally performed by Loraine Cohen MD based on my observations and the provider's statements to me.       Loraine Cohen MD  03/13/17 2323

## 2017-03-14 PROCEDURE — 25000132 ZZH RX MED GY IP 250 OP 250 PS 637: Performed by: OBSTETRICS & GYNECOLOGY

## 2017-03-14 PROCEDURE — 25000125 ZZHC RX 250: Performed by: OBSTETRICS & GYNECOLOGY

## 2017-03-14 PROCEDURE — 25000128 H RX IP 250 OP 636: Performed by: OBSTETRICS & GYNECOLOGY

## 2017-03-14 PROCEDURE — 12000000 ZZH R&B MED SURG/OB

## 2017-03-14 RX ORDER — ALUMINA, MAGNESIA, AND SIMETHICONE 2400; 2400; 240 MG/30ML; MG/30ML; MG/30ML
30 SUSPENSION ORAL EVERY 4 HOURS PRN
Status: DISCONTINUED | OUTPATIENT
Start: 2017-03-14 | End: 2017-03-16 | Stop reason: HOSPADM

## 2017-03-14 RX ORDER — TRAMADOL HYDROCHLORIDE 50 MG/1
50-100 TABLET ORAL EVERY 6 HOURS PRN
Status: DISCONTINUED | OUTPATIENT
Start: 2017-03-14 | End: 2017-03-16 | Stop reason: HOSPADM

## 2017-03-14 RX ORDER — SUMATRIPTAN 50 MG/1
100 TABLET, FILM COATED ORAL 2 TIMES DAILY PRN
Status: DISCONTINUED | OUTPATIENT
Start: 2017-03-14 | End: 2017-03-14

## 2017-03-14 RX ORDER — HYDROXYZINE HYDROCHLORIDE 25 MG/1
50 TABLET, FILM COATED ORAL EVERY 6 HOURS PRN
Status: DISCONTINUED | OUTPATIENT
Start: 2017-03-14 | End: 2017-03-16 | Stop reason: HOSPADM

## 2017-03-14 RX ORDER — CALCIUM CARBONATE 500 MG/1
500-1000 TABLET, CHEWABLE ORAL
Status: DISCONTINUED | OUTPATIENT
Start: 2017-03-14 | End: 2017-03-16 | Stop reason: HOSPADM

## 2017-03-14 RX ORDER — HYDROMORPHONE HYDROCHLORIDE 2 MG/1
2 TABLET ORAL
Status: DISCONTINUED | OUTPATIENT
Start: 2017-03-14 | End: 2017-03-16 | Stop reason: HOSPADM

## 2017-03-14 RX ORDER — SUMATRIPTAN 50 MG/1
100 TABLET, FILM COATED ORAL
Status: DISCONTINUED | OUTPATIENT
Start: 2017-03-14 | End: 2017-03-16 | Stop reason: HOSPADM

## 2017-03-14 RX ORDER — HYDROXYZINE HYDROCHLORIDE 25 MG/1
25 TABLET, FILM COATED ORAL EVERY 6 HOURS PRN
Status: DISCONTINUED | OUTPATIENT
Start: 2017-03-14 | End: 2017-03-16 | Stop reason: HOSPADM

## 2017-03-14 RX ORDER — CEFTRIAXONE 1 G/1
1 INJECTION, POWDER, FOR SOLUTION INTRAMUSCULAR; INTRAVENOUS EVERY 24 HOURS
Status: DISCONTINUED | OUTPATIENT
Start: 2017-03-14 | End: 2017-03-16 | Stop reason: HOSPADM

## 2017-03-14 RX ADMIN — DOCUSATE SODIUM 100 MG: 100 CAPSULE, LIQUID FILLED ORAL at 08:59

## 2017-03-14 RX ADMIN — OXYCODONE HYDROCHLORIDE 5 MG: 5 TABLET ORAL at 02:23

## 2017-03-14 RX ADMIN — CALCIUM CARBONATE (ANTACID) CHEW TAB 500 MG 1000 MG: 500 CHEW TAB at 11:20

## 2017-03-14 RX ADMIN — CEFTRIAXONE 1 G: 1 INJECTION, POWDER, FOR SOLUTION INTRAMUSCULAR; INTRAVENOUS at 23:44

## 2017-03-14 RX ADMIN — ONDANSETRON 4 MG: 4 TABLET, ORALLY DISINTEGRATING ORAL at 18:53

## 2017-03-14 RX ADMIN — ALUMINUM HYDROXIDE, MAGNESIUM HYDROXIDE, AND DIMETHICONE 30 ML: 400; 400; 40 SUSPENSION ORAL at 21:29

## 2017-03-14 RX ADMIN — ACETAMINOPHEN 975 MG: 325 TABLET, FILM COATED ORAL at 14:01

## 2017-03-14 RX ADMIN — DOCUSATE SODIUM 100 MG: 100 CAPSULE, LIQUID FILLED ORAL at 22:00

## 2017-03-14 RX ADMIN — ACETAMINOPHEN 975 MG: 325 TABLET, FILM COATED ORAL at 06:33

## 2017-03-14 RX ADMIN — ACETAMINOPHEN 975 MG: 325 TABLET, FILM COATED ORAL at 22:00

## 2017-03-14 RX ADMIN — TRAMADOL HYDROCHLORIDE 50 MG: 50 TABLET, COATED ORAL at 08:59

## 2017-03-14 RX ADMIN — DOXYCYCLINE HYCLATE 100 MG: 100 CAPSULE ORAL at 08:58

## 2017-03-14 RX ADMIN — TRAMADOL HYDROCHLORIDE 50 MG: 50 TABLET, COATED ORAL at 17:41

## 2017-03-14 RX ADMIN — TRAMADOL HYDROCHLORIDE 50 MG: 50 TABLET, COATED ORAL at 14:09

## 2017-03-14 RX ADMIN — CALCIUM CARBONATE (ANTACID) CHEW TAB 500 MG 500 MG: 500 CHEW TAB at 02:21

## 2017-03-14 RX ADMIN — ONDANSETRON 4 MG: 4 TABLET, ORALLY DISINTEGRATING ORAL at 02:23

## 2017-03-14 RX ADMIN — SODIUM CHLORIDE: 9 INJECTION, SOLUTION INTRAVENOUS at 05:25

## 2017-03-14 RX ADMIN — ONDANSETRON 4 MG: 2 INJECTION INTRAMUSCULAR; INTRAVENOUS at 11:23

## 2017-03-14 RX ADMIN — SUMATRIPTAN 100 MG: 50 TABLET, FILM COATED ORAL at 06:29

## 2017-03-14 RX ADMIN — CALCIUM CARBONATE (ANTACID) CHEW TAB 500 MG 1000 MG: 500 CHEW TAB at 23:47

## 2017-03-14 RX ADMIN — HYDROXYZINE HYDROCHLORIDE 50 MG: 25 TABLET ORAL at 21:29

## 2017-03-14 RX ADMIN — DOXYCYCLINE HYCLATE 100 MG: 100 CAPSULE ORAL at 22:00

## 2017-03-14 RX ADMIN — ALUMINUM HYDROXIDE, MAGNESIUM HYDROXIDE, AND DIMETHICONE 30 ML: 400; 400; 40 SUSPENSION ORAL at 14:02

## 2017-03-14 NOTE — PROGRESS NOTES
"I was consulted by phone for this patient.  Patient is 4 days s/p uncomplicated endometrial ablation.  She presented to clinic with a low grade fever and not feeling well.  Per Dr. House, her pelvic exam was consistent with s/p ablation and not necessarily concerning for endometritis.  She was tachycardic and had a recent URI.  She was asked to be assessed in the ED.  See ED note for details.  After full work up to exclude PE, pneumonia, influenza and normal CT chest/abdomen/pelvis, I was asked to admit her for what seemed like a UTI/pyelo with possible sepsis given the fever and sustained tachycardia despite IV hydration.  WBC count normal.  UA dirty.  She was given a dose of Rocephin in the ED.    BP (P) 118/76 (BP Location: Right arm)  Pulse (P) 106  Temp (P) 101.2  F (38.4  C) (Oral)  Resp (P) 15  Ht 1.727 m (5' 8\")  Wt 76.7 kg (169 lb)  LMP 03/04/2017  SpO2 (P) 98%  BMI 25.7 kg/m2    A/P: Fever and tachycardia s/p endometrial ablation.  After extensive work up in the ED, she appears to have a UTI/pyelonephritis - culture pending.  Rocephin given in the ED.  Given recent gynecologic surgery, I will add Doxycycline 100mg BID for coverage of endometritis.  Blood cultures are pending.  If continuing IV antibiotics are needed, her next dose of Rocephin would be due at 1900 tomorrow evening.      Janae Tirado MD  Obstetrics, Gynecology and Infertility      "

## 2017-03-14 NOTE — PHARMACY-ADMISSION MEDICATION HISTORY
Admission medication history interview status for the 3/13/2017  admission is complete. See EPIC admission navigator for prior to admission medications     Medication history source reliability:Good    Actions taken by pharmacist (provider contacted, etc): Called [facility] to confirm tramadol dose.     Additional medication history information not noted on PTA med list :None    Medication reconciliation/reorder completed by provider prior to medication history? No    Time spent in this activity: 15 min    Prior to Admission medications    Medication Sig Last Dose Taking? Auth Provider   DiphenhydrAMINE HCl (BENADRYL PO) Take 25 mg by mouth daily as needed prn Yes Unknown, Entered By History   Naproxen Sodium (ALEVE PO) Take 220 mg by mouth daily as needed for moderate pain  3/13/2017 at Unknown time Yes Reported, Patient   SUMAtriptan Succinate (IMITREX PO) Take 100 mg by mouth once as needed for migraine (MAY REPEAT IN 2 HOURS) prn Yes Reported, Patient   TRAMADOL HCL PO Take  mg by mouth every 6 hours as needed for moderate to severe pain  prn Yes Reported, Patient

## 2017-03-14 NOTE — PROVIDER NOTIFICATION
Notified Dr. Krishnamurthy of Pt's Blood cultures positive for gram negative rods.    Continue IV rocephin q24h.    Nursing will continue to monitor.     Update 1138-    Blood cultures + e coli.   Pt's vaginal discharge went from clear to blood tinged. MD updated, no new orders at this time.

## 2017-03-14 NOTE — ED NOTES
[facility]  ED Nurse Handoff Report    ED Chief complaint: No chief complaint on file.      ED Diagnosis:   Final diagnoses:   None       Code Status: Full Code    Allergies: No Known Allergies    Activity level:  Independent     Needed?: No    Isolation: No  Infection: Not Applicable    Bariatric?: No      Vital Signs:   Vitals:    03/13/17 1830 03/13/17 1845 03/13/17 1900 03/13/17 1915   BP: 115/80  121/73    Resp: 19 20 13 14   Temp:       TempSrc:       SpO2: 100% 100% 100% 100%   Weight:       Height:           Cardiac Rhythm: ,        Pain level: 0-10 Pain Scale: 6    Is this patient confused?: No    Patient Report: Initial Complaint: Pelvic pain and SOB, tachycardic. Sent over from OBGYN clinic.  Focused Assessment: AOx3. AVSS, tachycardic 110-120s, low grade temp. C/o SOB, sats 100% on RA. Only able to void small amounts. Pelvic/abdominal pain, rating 6/10. Dizzy at times.  Tests Performed: labs, CT, U/A  Abnormal Results:U/A  Treatments provided: 2300 cc bolus, IV abx, IV pain meds  Family Comments: mother at bedside    OBS brochure/video discussed/provided to patient: Yes    ED Medications:   Medications   0.9% sodium chloride infusion ( Intravenous Rate/Dose Change 3/13/17 1835)   cefTRIAXone (ROCEPHIN) 1 g vial to attach to  mL bag for ADULTS or NS 50 mL bag for PEDS (1 g Intravenous New Bag 3/13/17 1902)   0.9% sodium chloride BOLUS (0 mLs Intravenous Stopped 3/13/17 1835)   HYDROmorphone (PF) (DILAUDID) injection 0.5 mg (0.5 mg Intravenous Given 3/13/17 1554)   ondansetron (ZOFRAN) injection 4 mg (4 mg Intravenous Given 3/13/17 1554)   HYDROmorphone (PF) (DILAUDID) injection 0.5 mg (0.5 mg Intravenous Given 3/13/17 1720)   iopamidol (ISOVUE-370) solution 85 mL (85 mLs Intravenous Given 3/13/17 1802)   sodium chloride 0.9 % for CT scan flush dose 66 mL (66 mLs Intravenous Given 3/13/17 4771)   HYDROmorphone (PF) (DILAUDID) injection 0.5 mg (0.5 mg Intravenous Given  3/13/17 2852)       Drips infusing?:  No      ED NURSE PHONE NUMBER: *[phone]

## 2017-03-14 NOTE — PROGRESS NOTES
HD#1 afeb at this time,but experiencing chills  Status post endometrial ablation with fever,and UA findings suggesting UTI  On rocephin and doxycycline  Cultures pending  IMP:UTI  Plan: continue antibiotics, and discharge when afeb for 24 hours,and feeling better

## 2017-03-14 NOTE — PLAN OF CARE
Problem: Goal Outcome Summary  Goal: Goal Outcome Summary  Outcome: Improving  Alert and oriented, IND. VSS, tmax 99.3.  BC + for ecoli, PO and IV abx continue.  C/o abdominal pain, ultram helpful. Zofran and maalox for stomach upset. Trace pedal edema.  at bedside, supportive.

## 2017-03-14 NOTE — PLAN OF CARE
"Problem: Goal Outcome Summary  Goal: Goal Outcome Summary  Outcome: No Change  Patient A&O x4, temp 99.3, tachycardic, other VSS on RA, IV infusing, regular diet, up with SBA, voiding. Patient diaphoretic at beginning of shift, c/o \"chills\" as shift progressed. Temp 99.3, scheduled Tylenol given. Patient c/o abdominal pain and nausea, prn Zofran given with some relief. Prn Oxy given, patient reported development of migraine, Imitrex given. Will trial po Dilaudid for pain management per Dr. Tirado. Patient c/o indigestion, Tums given. LS clear, BS+, CMS+.  at bedside and supportive. Will continue to monitor.      "

## 2017-03-14 NOTE — PLAN OF CARE
Problem: Goal Outcome Summary  Goal: Goal Outcome Summary  Outcome: No Change  ED admit @ 2110. A&Ox4. Temp 101.2, scheduled tylenol given. Tachy 106, other VSS, O2 wnl RA. SBA, calls appropriately. C/o abdominal pain 7/10, prn Oxy 5 mg given x1. Denies CP/SOB. Voiding adequately. On po Doxycyclin BID for UTI. IVF infusing 125 mL/hr. Reg diet. DC pending. RN will cont to monitor.

## 2017-03-14 NOTE — PROVIDER NOTIFICATION
Spoke with Dr. Tirado per patient request. Patient reports development of a migraine post Oxycodone administration. Requesting her home medication Imitrex and something for pain instead of the Oxycodone.     Dr. Tirado verbally provided orders for Imitrex, stated to discontinue the Oxycodone and order po Dilaudid 2 mg q3hr prn in it's place. Orders have been updated accordingly and patient informed.

## 2017-03-15 PROCEDURE — 12000000 ZZH R&B MED SURG/OB

## 2017-03-15 PROCEDURE — 25000132 ZZH RX MED GY IP 250 OP 250 PS 637: Performed by: OBSTETRICS & GYNECOLOGY

## 2017-03-15 PROCEDURE — 25000128 H RX IP 250 OP 636: Performed by: OBSTETRICS & GYNECOLOGY

## 2017-03-15 RX ADMIN — ACETAMINOPHEN 975 MG: 325 TABLET, FILM COATED ORAL at 06:10

## 2017-03-15 RX ADMIN — ACETAMINOPHEN 975 MG: 325 TABLET, FILM COATED ORAL at 14:26

## 2017-03-15 RX ADMIN — SODIUM CHLORIDE: 9 INJECTION, SOLUTION INTRAVENOUS at 17:56

## 2017-03-15 RX ADMIN — ALUMINUM HYDROXIDE, MAGNESIUM HYDROXIDE, AND DIMETHICONE 30 ML: 400; 400; 40 SUSPENSION ORAL at 23:03

## 2017-03-15 RX ADMIN — DOXYCYCLINE HYCLATE 100 MG: 100 CAPSULE ORAL at 21:47

## 2017-03-15 RX ADMIN — SODIUM CHLORIDE: 9 INJECTION, SOLUTION INTRAVENOUS at 10:00

## 2017-03-15 RX ADMIN — SODIUM CHLORIDE: 9 INJECTION, SOLUTION INTRAVENOUS at 02:11

## 2017-03-15 RX ADMIN — DOCUSATE SODIUM 100 MG: 100 CAPSULE, LIQUID FILLED ORAL at 09:01

## 2017-03-15 RX ADMIN — DOXYCYCLINE HYCLATE 100 MG: 100 CAPSULE ORAL at 09:00

## 2017-03-15 RX ADMIN — SUMATRIPTAN 100 MG: 50 TABLET, FILM COATED ORAL at 09:00

## 2017-03-15 RX ADMIN — CEFTRIAXONE 1 G: 1 INJECTION, POWDER, FOR SOLUTION INTRAMUSCULAR; INTRAVENOUS at 21:47

## 2017-03-15 NOTE — CONSULTS
[facility]    Infectious Disease Consultation     Date of Admission:  3/13/2017  Date of Consult (When I saw the patient): 03/15/17    Assessment & Plan   Vikki Merlos is a 36 year old female who was admitted on 3/13/2017. I was asked to see the patient for E coli bacteremia, possible urosepsis.     Impression:   36 y.o patient who recently had endometrial ablation done. Was catheterized around the procedure.   Post operatively developed chills, nausea, abdominal discomfort, fevers.   Found to be bacteremic with E coli, UA also positive no UC done.   On ceftriaxone and doxy improved clinically.   Differential include Urosepsis or Endometritis    Recommendations:   Agree with IV ceftriaxone, when ready for discharge can switch to oral Augmentin and Cipro for 14 days to cover complicated UTI/ bacteremia, ? Endometritis.       Destiny Potter MD    Reason for Consult   Reason for consult: I was asked by Dr. House to evaluate this patient for above mentioned.    Primary Care Physician   Caitlin House    Chief Complaint   Fever     History is obtained from the patient and medical records    History of Present Illness   Vikki Merlos is a 36 year old female 4 days s/p endometrial ablation admitted with low grade fever and not feeling well. UA positive for infection, UC was not done , BLood culture positive for E coli pretty susceptible, patient improving on Ceftriaxone and Doxy.     Past Medical History   I have reviewed this patient's medical history and updated it with pertinent information if needed.   Past Medical History   Diagnosis Date     Anemia      Anxiety      Cervical dysplasia      Constipation      Migraine      Pneumonia      current cold       Past Surgical History   I have reviewed this patient's surgical history and updated it with pertinent information if needed.  Past Surgical History   Procedure Laterality Date     Laparoscopic cystectomy ovarian (benign) Right      1st  trimester     Conization leep       Laparoscopy diagnostic (gyn)       WITH RIGHT SALPINGECTOMY, LAPAROSCOPY WITH JEM,      Dilation and curettage, hysteroscopy diagnostic, combined       Head & neck surgery       WISDOM TEETH EXTRACTED     Dilation and curettage, hysteroscopy, ablate endometrium novasure, combined N/A 3/9/2017     Procedure: COMBINED DILATION AND CURETTAGE, HYSTEROSCOPY, ABLATE ENDOMETRIUM NOVASURE;  Surgeon: Caitlin House MD;  Location: [facility]       Prior to Admission Medications   Prior to Admission Medications   Prescriptions Last Dose Informant Patient Reported? Taking?   DiphenhydrAMINE HCl (BENADRYL PO) prn  Yes Yes   Sig: Take 25 mg by mouth daily as needed   Naproxen Sodium (ALEVE PO) 3/13/2017 at Unknown time  Yes Yes   Sig: Take 220 mg by mouth daily as needed for moderate pain    SUMAtriptan Succinate (IMITREX PO) prn  Yes Yes   Sig: Take 100 mg by mouth once as needed for migraine (MAY REPEAT IN 2 HOURS)   TRAMADOL HCL PO prn  Yes Yes   Sig: Take  mg by mouth every 6 hours as needed for moderate to severe pain       Facility-Administered Medications: None     Allergies   No Known Allergies    Immunization History   Immunization History   Administered Date(s) Administered     Influenza Vaccine IM 3yrs+ 4 Valent IIV4 10/25/2016       Social History   I have reviewed this patient's social history and updated it with pertinent information if needed. Vikki Merlos  reports that she has never smoked. She does not have any smokeless tobacco history on file. She reports that she does not drink alcohol or use illicit drugs.    Family History   I have reviewed this patient's family history and updated it with pertinent information if needed.   No family history on file.    Review of Systems   The 10 point Review of Systems is negative other than noted in the HPI or here.    Physical Exam   Temp: 98.6  F (37  C) Temp src: Oral BP: 113/70 Pulse: 82 Heart Rate: 78 Resp: 18 SpO2: 100 %  O2 Device: None (Room air)    Vital Signs with Ranges  Temp:  [98  F (36.7  C)-98.6  F (37  C)] 98.6  F (37  C)  Pulse:  [81-82] 82  Heart Rate:  [78] 78  Resp:  [16-18] 18  BP: (109-113)/(65-74) 113/70  SpO2:  [97 %-100 %] 100 %  190 lbs 14.69 oz    GENERAL APPEARANCE:  alert and no distress  EYES: Eyes grossly normal to inspection, PERRL and conjunctivae and sclerae normal  HENT: ear canals and TM's normal and nose and mouth without ulcers or lesions  NECK: no adenopathy, no asymmetry, masses, or scars and thyroid normal to palpation  RESP: lungs clear to auscultation - no rales, rhonchi or wheezes  CV: regular rates and rhythm, normal S1 S2, no S3 or S4 and no murmur, click or rub  LYMPHATICS: normal ant/post cervical and supraclavicular nodes  ABDOMEN: soft, nontender, without hepatosplenomegaly or masses and bowel sounds normal  MS: extremities normal- no gross deformities noted  SKIN: no suspicious lesions or rashes  NEURO: Normal strength and tone, mentation intact and speech normal  PSYCH: mentation appears normal and affect normal/bright    Data   Lab Results   Component Value Date    WBC 7.7 03/13/2017    HGB 13.5 03/13/2017    HCT 40.8 03/13/2017     03/13/2017     03/13/2017    POTASSIUM 3.7 03/13/2017    CHLORIDE 102 03/13/2017    CO2 29 03/13/2017    BUN 11 03/13/2017    CR 0.82 03/13/2017    GLC 96 03/13/2017    DD 2.9 (H) 03/13/2017    AST 43 03/13/2017    ALT 55 (H) 03/13/2017    ALKPHOS 119 03/13/2017    BILITOTAL 0.3 03/13/2017       Recent Labs  Lab 03/13/17  1621 03/13/17  1551   CULT Cultured on the 1st day of incubation: Escherichia coliCritical Value/Significant Value, preliminary result only, called to and read back by Yeimy Naik RN. 3.14.17 @ [facility]. (Note)POSITIVE for E. COLI by CSL DualComigene multiplex nucleic acid test. Finalidentification and antimicrobial susceptibility testing will beverified by standard methods.Specimen tested with CSL DualComigene multiplex, gram-negative blood  culturenucleic acid test for the following targets: Acinetobacter sp.,Citrobacter sp., Enterobacter sp., Proteus sp., E. coli, K.pneumoniae/oxytoca, P. aeruginosa, and the following resistancemarkers: CTXM, KPC, NDM, VIM, IMP and OXA.Critical Value/Significant Value called to and read back by Trenton RN, 1116 3.14.17 mw.* No growth after 2 days     Recent Labs   Lab Test  03/13/17   1621  03/13/17   1551   CULT  Cultured on the 1st day of incubation: Escherichia coli  Critical Value/Significant Value, preliminary result only, called to and read   back by Yeimy Naik RN. 3.14.17 @ 0904.   (Note)  POSITIVE for E. COLI by Verigene multiplex nucleic acid test. Final  identification and antimicrobial susceptibility testing will be  verified by standard methods.    Specimen tested with Verigene multiplex, gram-negative blood culture  nucleic acid test for the following targets: Acinetobacter sp.,  Citrobacter sp., Enterobacter sp., Proteus sp., E. coli, K.  pneumoniae/oxytoca, P. aeruginosa, and the following resistance  markers: CTXM, KPC, NDM, VIM, IMP and OXA.    Critical Value/Significant Value called to and read back by Opal Villegas RN, 1116 3.14.17 mw.  *  No growth after 2 days

## 2017-03-15 NOTE — PROGRESS NOTES
"POD 6 s/p endometrial ablation, HD 3 Urosepsis, on Ceftriaxone/Doxycycline  S.  Feeling better, feeling a migraine coming on.  On Monday, had sweats, dizziness, tachycardia, diffuse abd pains.  O.  AVSS  /65 (BP Location: Right arm)  Pulse 81  Temp 98.2  F (36.8  C) (Oral)  Resp 16  Ht 1.727 m (5' 8\")  Wt 86.6 kg (190 lb 14.7 oz)  LMP 03/04/2017  SpO2 100%  BMI 29.03 kg/m2  Back no CVAT  Abd soft, vague mild abd tenderness  Pelvic exam on Monday, no CMT, some uterine tenderness c/w postop ablation    Labs - one blood culture positive for E coli; I called the lab and no UC was sent    A.  POD 6 s/p hysteroscopy with endometrial ablation  HD 3, presumed urosepsis, on ceftriaxone and doxycycline, however cannot rule out endometritis.  Pt states that she is feeling better overall.  Her tachycardia has resolved.  She is afebrile.  Positive blood culture for E coli.  I called lab and a UC was not sent even though UA was abnormal.    P.  ID consult regarding length of antibiotic treatment; continue with ceftriaxone and doxycycline for now. /LBakerMD      "

## 2017-03-15 NOTE — PLAN OF CARE
Problem: Goal Outcome Summary  Goal: Goal Outcome Summary  Outcome: Improving  Patient had VSS, denied any chest pain and SOB. Patient complained of a migraine started, Imitrex given with relief. Patient complained of minimal abdominal cramping, PODx6 of endometrial ablation. Waiting for ID to see patient to update OBGYN MD. Possible d/c tomorrow.

## 2017-03-15 NOTE — PLAN OF CARE
Problem: Goal Outcome Summary  Goal: Goal Outcome Summary  Outcome: Improving  Pt A/Ox4. Independent. VSS on RA. Complaints of abdominal discomfort, Maalox given with some relief. Complaints of nausea, MD notified and Ataarx ordered per pt request. D/C 1-2 days pending progress.

## 2017-03-16 VITALS
SYSTOLIC BLOOD PRESSURE: 132 MMHG | WEIGHT: 189.82 LBS | BODY MASS INDEX: 28.77 KG/M2 | HEART RATE: 70 BPM | HEIGHT: 68 IN | DIASTOLIC BLOOD PRESSURE: 89 MMHG | OXYGEN SATURATION: 97 % | RESPIRATION RATE: 16 BRPM | TEMPERATURE: 97.7 F

## 2017-03-16 LAB
BACTERIA SPEC CULT: ABNORMAL
C DIFF TOX B STL QL: ABNORMAL
Lab: ABNORMAL
MICRO REPORT STATUS: ABNORMAL
MICROORGANISM SPEC CULT: ABNORMAL
SPECIMEN SOURCE: ABNORMAL
SPECIMEN SOURCE: ABNORMAL

## 2017-03-16 PROCEDURE — 25000128 H RX IP 250 OP 636: Performed by: OBSTETRICS & GYNECOLOGY

## 2017-03-16 PROCEDURE — 87493 C DIFF AMPLIFIED PROBE: CPT | Performed by: OBSTETRICS & GYNECOLOGY

## 2017-03-16 PROCEDURE — 25000132 ZZH RX MED GY IP 250 OP 250 PS 637: Performed by: OBSTETRICS & GYNECOLOGY

## 2017-03-16 RX ORDER — CIPROFLOXACIN 500 MG/1
500 TABLET, FILM COATED ORAL 2 TIMES DAILY
Qty: 28 TABLET | Refills: 0 | Status: SHIPPED | OUTPATIENT
Start: 2017-03-16 | End: 2018-09-07

## 2017-03-16 RX ADMIN — SODIUM CHLORIDE: 9 INJECTION, SOLUTION INTRAVENOUS at 03:20

## 2017-03-16 RX ADMIN — DOXYCYCLINE HYCLATE 100 MG: 100 CAPSULE ORAL at 08:40

## 2017-03-16 NOTE — PROVIDER NOTIFICATION
MD Notification    Notified Person:  MD    Notified Persons Name: OBGYN    Notification Date/Time: 3/16/2017 1400    Notification Interaction:  Talked with Physician     Purpose of Notification: positive for c. diff    Orders Received: MD will call Dr Potter with ID    Comments:

## 2017-03-16 NOTE — PLAN OF CARE
Problem: Goal Outcome Summary  Goal: Goal Outcome Summary  Outcome: Improving   A &O. VSS. C/o abdominal pain/discomfort 4/10; declined any interventions. Denies s/sx of UTI. Independent.

## 2017-03-16 NOTE — PROGRESS NOTES
"Progress Note:    POD 7 s/p endometrial ablation, HD 4 sepsis likely due to UTI, on Ceftriaxone/Doxycycline    S.  Feeling much better and wants to discharge. Had an episode of diarrhea this morning and thus per hospital policy a C. Diff culture was sent.    O.  AVSS    /89 (BP Location: Right arm)  Pulse 70  Temp 97.7  F (36.5  C) (Oral)  Resp 16  Ht 1.727 m (5' 8\")  Wt 86.1 kg (189 lb 13.1 oz)  LMP 03/04/2017  SpO2 97%  BMI 28.86 kg/m2  Back no CVAT  Abd soft, nontender  Pelvic exam on Monday, no CMT, some uterine tenderness c/w postop ablation    Labs - one blood culture positive for E coli; apparently no UC was sent    A.  POD 7 s/p hysteroscopy with endometrial ablation  HD 4, sepsis presumed due to UTI, on ceftriaxone and doxycycline, however cannot rule out endometritis.  Pt states that she is feeling better overall.  Her tachycardia has resolved.  She is afebrile.  Positive blood culture for E coli. Unfortunately a UC was not sent even though UA was abnormal.    P.  ID was consulted and has seen the patient.  They recommend discharge on Augmentin and Ciprofloxacin so discharge orders and Rx's written.  If C. Diff test is positive will contact ID in terms of management.    Janae Tirado MD  Obstetrics, Gynecology and Infertility          "

## 2017-03-16 NOTE — PLAN OF CARE
Problem: Goal Outcome Summary  Goal: Goal Outcome Summary  Outcome: Adequate for Discharge Date Met:  03/16/17  Discharged to home. Filled antibiotics in hand. C. Diff pending. She will make follow up appointment. Ambulated out of hospital independently per her request.

## 2017-03-16 NOTE — PLAN OF CARE
Problem: Goal Outcome Summary  Goal: Goal Outcome Summary  Outcome: Improving  Denies pain. Up independent in room. Showered.

## 2017-03-19 LAB
BACTERIA SPEC CULT: NO GROWTH
Lab: NORMAL
MICRO REPORT STATUS: NORMAL
SPECIMEN SOURCE: NORMAL

## 2017-03-20 ENCOUNTER — CARE COORDINATION (OUTPATIENT)
Dept: CASE MANAGEMENT | Facility: CLINIC | Age: 37
End: 2017-03-20

## 2017-03-24 LAB
C TRACH DNA SPEC QL PROBE+SIG AMP: NEGATIVE
N GONORRHOEA DNA SPEC QL PROBE+SIG AMP: NEGATIVE
POTASSIUM SERPL-SCNC: 4.6 MMOL/L (ref 3.5–5.2)
SPECIMEN DESCRIP: NORMAL
SPECIMEN DESCRIPTION: NORMAL

## 2017-05-04 NOTE — DISCHARGE SUMMARY
Obstetrical Discharge Form     Admission Date: 3/13/2017     Discharge Date:  3/16/2017 12:14 PM     Reason for admission: Fever, tachycardia     Primary Diagnosis:   Likely urosepsis    Secondary Diagnosis:   E. Coli bacteremia  S/p endometrial ablation - uncomplicated on 3/9/2017  Clostridium difficile infection (lab result returned after discharge)     Primary Clinician: Caitlin House MD        Hospital Course: Patient is a 36 year old yo  that presented to the ED with malaise, fever and tachycardia 4 days s/p an uncomplicated endometrial ablation.  UA at the time of admission was concerning for UTI and she was given a dose of Rocephin in the ED.  Due to persistent fever and tachycardia despite hydration, she was admitted with concerns for possible sepsis.  Given her recent endometrial ablation, oral Doxycycline was added for coverage of endometritis.  WBC count was normal and her fever resolved.  Blood cultures x 2 one of which grew out E. Coli.  Unfortunately, a urine culture was never sent.  Infectious disease was consulted regarding length of antibiotic treatment.  They recommended Augmentin and Ciprofloxacin upon discharge.  She had an episode of diarrhea the morning of discharge and a C. Diff test was sent which ultimately returned positive later that day after the patient had been discharged.  ID was again consulted regarding treatment and the Ciprofloxacin was stopped.  Vancomycin was added.  She was asked to call with worsening diarrhea, fever or other concerns.  She will likely need a PCP to follow up with and will discuss with Dr. House.         Janae Tirado MD  Obstetrics, Gynecology and Infertility

## 2017-05-08 ENCOUNTER — MEDICAL CORRESPONDENCE (OUTPATIENT)
Dept: HEALTH INFORMATION MANAGEMENT | Facility: CLINIC | Age: 37
End: 2017-05-08

## 2017-05-08 DIAGNOSIS — A04.72 INTESTINAL INFECTION DUE TO CLOSTRIDIUM DIFFICILE: Primary | ICD-10-CM

## 2017-11-14 ENCOUNTER — ALLIED HEALTH/NURSE VISIT (OUTPATIENT)
Dept: NURSING | Facility: CLINIC | Age: 37
End: 2017-11-14
Payer: COMMERCIAL

## 2017-11-14 DIAGNOSIS — Z23 NEED FOR PROPHYLACTIC VACCINATION AND INOCULATION AGAINST INFLUENZA: Primary | ICD-10-CM

## 2017-11-14 PROCEDURE — 90471 IMMUNIZATION ADMIN: CPT

## 2017-11-14 PROCEDURE — 90686 IIV4 VACC NO PRSV 0.5 ML IM: CPT

## 2017-11-14 PROCEDURE — 99207 ZZC NO CHARGE NURSE ONLY: CPT

## 2017-11-14 NOTE — PROGRESS NOTES

## 2017-11-14 NOTE — MR AVS SNAPSHOT
"              After Visit Summary   2017    Vikki Merlos    MRN: [id number]           Patient Information     Date Of Birth          1980        Visit Information        Provider Department      2017 10:00 AM [facility] [place] Génesis Soriano        Today's Diagnoses     Need for prophylactic vaccination and inoculation against influenza    -  1       Follow-ups after your visit        Who to contact     If you have questions or need follow up information about today's clinic visit or your schedule please contact [facility] directly at [phone].  Normal or non-critical lab and imaging results will be communicated to you by [email or web address], letter or phone within 4 business days after the clinic has received the results. If you do not hear from us within 7 days, please contact the clinic through [email or web address] or phone. If you have a critical or abnormal lab result, we will notify you by phone as soon as possible.  Submit refill requests through MAPPING or call your pharmacy and they will forward the refill request to us. Please allow 3 business days for your refill to be completed.          Additional Information About Your Visit        MyChart Information     MAPPING lets you send messages to your doctor, view your test results, renew your prescriptions, schedule appointments and more. To sign up, go to [email or web address] . Click on \"Log in\" on the left side of the screen, which will take you to the Welcome page. Then click on \"Sign up Now\" on the right side of the page.     You will be asked to enter the access code listed below, as well as some personal information. Please follow the directions to create your username and password.     Your access code is: [email or web address]  Expires: 2018 10:18 AM     Your access code will  in 90 days. If you need help or a new code, please call your [facility] or [phone].        Care EveryWhere ID     This is your Care EveryWhere " ID. This could be used by other organizations to access your [facility] medical records  [id number]         Blood Pressure from Last 3 Encounters:   03/16/17 132/89   03/09/17 129/82    Weight from Last 3 Encounters:   03/16/17 189 lb 13.1 oz (86.1 kg)   03/09/17 166 lb 1.6 oz (75.3 kg)              We Performed the Following     FLU VAC, SPLIT VIRUS IM > 3 YO (QUADRIVALENT) [64519]     Vaccine Administration, Initial [[id number]]        Primary Care Provider Office Phone # Fax #    [facility] [phone]       [address] [facility] [address]        Equal Access to Services     AMANDA JACOBO : Hadii aad ku hadasho Sogogoali, waaxda luqadaha, qaybta kaalmada adeegyada, qiana silva. So [facility] [phone].    ATENCIÓN: Si habla español, tiene a wheeler disposición servicios gratuitos de asistencia lingüística. Llame al [phone].    We comply with applicable federal civil rights laws and [place] laws. We do not discriminate on the basis of race, color, national origin, age, disability, sex, sexual orientation, or gender identity.            Thank you!     Thank you for choosing [facility]  for your care. Our goal is always to provide you with excellent care. Hearing back from our patients is one way we can continue to improve our services. Please take a few minutes to complete the written survey that you may receive in the mail after your visit with us. Thank you!             Your Updated Medication List - Protect others around you: Learn how to safely use, store and throw away your medicines at www.disposemymeds.org.          This list is accurate as of: 11/14/17 10:18 AM.  Always use your most recent med list.                   Brand Name Dispense Instructions for use Diagnosis    ALEVE PO      Take 220 mg by mouth daily as needed for moderate pain        amoxicillin-clavulanate 875-125 MG per tablet    AUGMENTIN    28 tablet    Take 1 tablet by  mouth 2 times daily    Sepsis, due to unspecified organism (H)       BENADRYL PO      Take 25 mg by mouth daily as needed        ciprofloxacin 500 MG tablet    CIPRO    28 tablet    Take 1 tablet (500 mg) by mouth 2 times daily    Sepsis, due to unspecified organism (H)       IMITREX PO      Take 100 mg by mouth once as needed for migraine (MAY REPEAT IN 2 HOURS)        TRAMADOL HCL PO      Take  mg by mouth every 6 hours as needed for moderate to severe pain

## 2018-02-15 LAB
CHLAMYDIA - HIM PATIENT REPORTED: NEGATIVE
CHOLEST SERPL-MCNC: 195 MG/DL (ref 100–199)
GLUCOSE SERPL-MCNC: 82 MG/DL (ref 65–99)
HDLC SERPL-MCNC: 71 MG/DL
HEP C HIM: NORMAL
HIV 1&2 EXT: NORMAL
LDLC SERPL CALC-MCNC: 99 MG/DL (ref 0–99)
PAP SMEAR - HIM PATIENT REPORTED: NEGATIVE
TRIGL SERPL-MCNC: 126 MG/DL (ref 0–149)
TSH SERPL-ACNC: 1.07 ULU/ML (ref 0.45–4.5)

## 2018-09-07 ENCOUNTER — OFFICE VISIT (OUTPATIENT)
Dept: FAMILY MEDICINE | Facility: CLINIC | Age: 38
End: 2018-09-07
Payer: COMMERCIAL

## 2018-09-07 VITALS
DIASTOLIC BLOOD PRESSURE: 83 MMHG | BODY MASS INDEX: 25.98 KG/M2 | OXYGEN SATURATION: 98 % | SYSTOLIC BLOOD PRESSURE: 142 MMHG | WEIGHT: 171.4 LBS | RESPIRATION RATE: 16 BRPM | HEART RATE: 102 BPM | HEIGHT: 68 IN | TEMPERATURE: 97.9 F

## 2018-09-07 DIAGNOSIS — R42 VERTIGO: Primary | ICD-10-CM

## 2018-09-07 LAB
ERYTHROCYTE [DISTWIDTH] IN BLOOD BY AUTOMATED COUNT: 12 % (ref 10–15)
HCT VFR BLD AUTO: 39.4 % (ref 35–47)
HGB BLD-MCNC: 12.9 G/DL (ref 11.7–15.7)
MCH RBC QN AUTO: 31.3 PG (ref 26.5–33)
MCHC RBC AUTO-ENTMCNC: 32.7 G/DL (ref 31.5–36.5)
MCV RBC AUTO: 96 FL (ref 78–100)
PLATELET # BLD AUTO: 261 10E9/L (ref 150–450)
RBC # BLD AUTO: 4.12 10E12/L (ref 3.8–5.2)
WBC # BLD AUTO: 4.7 10E9/L (ref 4–11)

## 2018-09-07 PROCEDURE — 36415 COLL VENOUS BLD VENIPUNCTURE: CPT | Performed by: FAMILY MEDICINE

## 2018-09-07 PROCEDURE — 99214 OFFICE O/P EST MOD 30 MIN: CPT | Performed by: FAMILY MEDICINE

## 2018-09-07 PROCEDURE — 85027 COMPLETE CBC AUTOMATED: CPT | Performed by: FAMILY MEDICINE

## 2018-09-07 RX ORDER — METHYLPREDNISOLONE 4 MG
TABLET, DOSE PACK ORAL
Qty: 21 TABLET | Refills: 0 | Status: SHIPPED | OUTPATIENT
Start: 2018-09-07 | End: 2018-10-22

## 2018-09-07 RX ORDER — METHOCARBAMOL 500 MG/1
500 TABLET, FILM COATED ORAL 4 TIMES DAILY PRN
COMMUNITY
End: 2018-10-22

## 2018-09-07 RX ORDER — MINOCYCLINE HYDROCHLORIDE 100 MG/1
100 CAPSULE ORAL 2 TIMES DAILY
COMMUNITY
End: 2018-10-22

## 2018-09-07 RX ORDER — MECLIZINE HYDROCHLORIDE 25 MG/1
25 TABLET ORAL EVERY 6 HOURS PRN
Qty: 30 TABLET | Refills: 0 | Status: SHIPPED | OUTPATIENT
Start: 2018-09-07 | End: 2018-10-22

## 2018-09-07 ASSESSMENT — PAIN SCALES - GENERAL: PAINLEVEL: NO PAIN (0)

## 2018-09-07 NOTE — MR AVS SNAPSHOT
After Visit Summary   9/7/2018    Vikki Merlos    MRN: [id number]           Patient Information     Date Of Birth          1980        Visit Information        Provider Department      9/7/2018 1:00 PM Yamile Coronel MD [facility]        Today's Diagnoses     Vertigo    -  1      Care Instructions      Vertigo (Unknown Cause)    In addition to helping with hearing, the inner ear is part of the balance center of your body. Problems with the inner ear can a false feeling of motion. This is called vertigo. Often, it feels as if you or the room is spinning. A vertigo attack may cause sudden nausea, vomiting and heavy sweating. Severe vertigo causes a loss of balance and can cause you to fall. During vertigo, small head movements and changes in body position will often make the symptoms worse. You may also have ringing in the ears called tinnitus.  An episode of vertigo may last seconds, minutes or hours. Once you are over the first episode, it may never come back. However, symptoms may return off and on.  The cause of your vertigo is not yet known. Possible causes of vertigo include:    Inflammation of the inner ear    Disease of the nerves to the inner ear    Movement of calcium particles in the inner ear    Poor blood flow to the balance centers of the brain    Migraine headaches    In older adults, the use of more than one medicine along with some health conditions  Home care    If symptoms are severe, rest quietly in bed. Change positions very slowly. There is usually one position that will feel best, such as lying on one side or lying on your back with your head slightly raised on pillows. Until you have no symptoms, you are at a higher risk of falling. Let someone help you when you get up. Get rid of home hazards such as loose electrical cords and throw rugs. Don t walk in unfamiliar areas that are not lighted. Use night lights in bathrooms and kitchen areas.    Do not  drive a car or work with dangerous machinery until symptoms have been gone for at least one week.    Take medicine as prescribed to relieve your symptoms. Unless another medicine was prescribed for symptoms of nausea, vomiting, and dizziness, you may use over-the-counter motion sickness pills. Ask your pharmacist for suggestions.  Follow-up care  Follow up with your healthcare provider or as directed. If you are referred to a specialist or for testing, make the appointment promptly.  When to seek medical advice  Call your healthcare provider if any of the following occur:    Fever of 100.4 F (38 C) or higher, or as directed by your healthcare provider    Vertigo worsens or is not controlled by prescribed medicine     Repeated vomiting not relieved by prescribed medicine     Severe headache    Confusion    Weakness of an arm or leg or 1 side of the face    Difficulty with speech or vision    Loss of consciousness     Seizure  Date Last Reviewed: 11/1/2017 2000-2017 [employer]. [address] [place] [address]. All rights reserved. This information is not intended as a substitute for professional medical care. Always follow your healthcare professional's instructions.                Follow-ups after your visit        Follow-up notes from your care team     Return if symptoms worsen or fail to improve.      Who to contact     Normal or non-critical lab and imaging results will be communicated to you by MyChart, letter or phone within 4 business days after the clinic has received the results. If you do not hear from us within 7 days, please contact the clinic through [employer] or phone. If you have a critical or abnormal lab result, we will notify you by phone as soon as possible.  Submit refill requests through [employer] or call your pharmacy and they will forward the refill request to us. Please allow 3 business days for your refill to be completed.          If you need to speak with a Medical  " for additional information , please call: [phone]             Additional Information About Your Visit        [employer] Information     [employer] lets you send messages to your doctor, view your test results, renew your prescriptions, schedule appointments and more. To sign up, go to [email or web address] . Click on \"Log in\" on the left side of the screen, which will take you to the Welcome page. Then click on \"Sign up Now\" on the right side of the page.     You will be asked to enter the access code listed below, as well as some personal information. Please follow the directions to create your username and password.     Your access code is: [email or web address]  Expires: 2018  1:34 PM     Your access code will  in 90 days. If you need help or a new code, please call your [place] clinic or [phone].        Care EveryWhere ID     This is your Care EveryWhere ID. This could be used by other organizations to access your [place] medical records  [id number]        Your Vitals Were     Pulse Temperature Respirations Height Pulse Oximetry BMI (Body Mass Index)    102 97.9  F (36.6  C) (Oral) 16 5' 8\" (1.727 m) 98% 26.06 kg/m2       Blood Pressure from Last 3 Encounters:   18 152/87   17 132/89   17 129/82    Weight from Last 3 Encounters:   18 171 lb 6.4 oz (77.7 kg)   17 189 lb 13.1 oz (86.1 kg)   17 166 lb 1.6 oz (75.3 kg)              We Performed the Following     CBC with platelets          Today's Medication Changes          These changes are accurate as of 18  1:34 PM.  If you have any questions, ask your nurse or doctor.               Start taking these medicines.        Dose/Directions    methylPREDNISolone 4 MG tablet   Commonly known as:  MEDROL DOSEPAK   Used for:  Vertigo   Started by:  Yamile Coronel MD        Follow package instructions   Quantity:  21 tablet   Refills:  0            Where to get your medicines      These medications were " sent to [facility] [place], [place] - [address] [address] [address] [facility]  [address] [address] [facility] [address] [address]     Phone:  [phone]     methylPREDNISolone 4 MG tablet                Primary Care Provider Office Phone # Fax #    [facility] [phone] [address] [address] [facility]  [place] [address]        Equal Access to Services     AMANDA JACOBO : Hadii aad ku hadasho Soomaali, waaxda luqadaha, qaybta kaalmada adeegyada, waxay idiin hayaan adeeg kharash la'aan ah. So [facility] [phone].    ATENCIÓN: Si habla español, tiene a wheeler disposición servicios gratuitos de asistencia lingüística. Carley al [phone].    We comply with applicable federal civil rights laws and [place] laws. We do not discriminate on the basis of race, color, national origin, age, disability, sex, sexual orientation, or gender identity.            Thank you!     Thank you for choosing [facility]  for your care. Our goal is always to provide you with excellent care. Hearing back from our patients is one way we can continue to improve our services. Please take a few minutes to complete the written survey that you may receive in the mail after your visit with us. Thank you!             Your Updated Medication List - Protect others around you: Learn how to safely use, store and throw away your medicines at www.disposemymeds.org.          This list is accurate as of 9/7/18  1:34 PM.  Always use your most recent med list.                   Brand Name Dispense Instructions for use Diagnosis    ALEVE PO      Take 220 mg by mouth daily as needed for moderate pain        IMITREX PO      Take 100 mg by mouth once as needed for migraine (MAY REPEAT IN 2 HOURS)        isometheptene-dichloralphenazone-acetaminophen -325 MG per capsule    MIDRIN     Take 1 capsule by mouth every 4 hours as needed for headaches        methocarbamol 500 MG tablet    ROBAXIN     Take 500 mg by mouth 4 times daily as needed for  muscle spasms        methylPREDNISolone 4 MG tablet    MEDROL DOSEPAK    21 tablet    Follow package instructions    Vertigo       minocycline 100 MG capsule    MINOCIN/DYNACIN     Take 100 mg by mouth 2 times daily        TRAMADOL HCL PO      Take  mg by mouth every 6 hours as needed for moderate to severe pain

## 2018-09-07 NOTE — PATIENT INSTRUCTIONS
Vertigo (Unknown Cause)    In addition to helping with hearing, the inner ear is part of the balance center of your body. Problems with the inner ear can a false feeling of motion. This is called vertigo. Often, it feels as if you or the room is spinning. A vertigo attack may cause sudden nausea, vomiting and heavy sweating. Severe vertigo causes a loss of balance and can cause you to fall. During vertigo, small head movements and changes in body position will often make the symptoms worse. You may also have ringing in the ears called tinnitus.  An episode of vertigo may last seconds, minutes or hours. Once you are over the first episode, it may never come back. However, symptoms may return off and on.  The cause of your vertigo is not yet known. Possible causes of vertigo include:    Inflammation of the inner ear    Disease of the nerves to the inner ear    Movement of calcium particles in the inner ear    Poor blood flow to the balance centers of the brain    Migraine headaches    In older adults, the use of more than one medicine along with some health conditions  Home care    If symptoms are severe, rest quietly in bed. Change positions very slowly. There is usually one position that will feel best, such as lying on one side or lying on your back with your head slightly raised on pillows. Until you have no symptoms, you are at a higher risk of falling. Let someone help you when you get up. Get rid of home hazards such as loose electrical cords and throw rugs. Don t walk in unfamiliar areas that are not lighted. Use night lights in bathrooms and kitchen areas.    Do not drive a car or work with dangerous machinery until symptoms have been gone for at least one week.    Take medicine as prescribed to relieve your symptoms. Unless another medicine was prescribed for symptoms of nausea, vomiting, and dizziness, you may use over-the-counter motion sickness pills. Ask your pharmacist for suggestions.  Follow-up care   Follow up with your healthcare provider or as directed. If you are referred to a specialist or for testing, make the appointment promptly.  When to seek medical advice  Call your healthcare provider if any of the following occur:    Fever of 100.4 F (38 C) or higher, or as directed by your healthcare provider    Vertigo worsens or is not controlled by prescribed medicine     Repeated vomiting not relieved by prescribed medicine     Severe headache    Confusion    Weakness of an arm or leg or 1 side of the face    Difficulty with speech or vision    Loss of consciousness     Seizure  Date Last Reviewed: 11/1/2017 2000-2017 [employer]. [address] [place] [address]. All rights reserved. This information is not intended as a substitute for professional medical care. Always follow your healthcare professional's instructions.

## 2018-09-07 NOTE — PROGRESS NOTES
SUBJECTIVE:   Vikki Merlos is a 38 year old female who presents to clinic today for the following health issues:      Dizziness  Onset: 8/30/18 - started with cold sx's -- still feel congested, but dizziness started on 9/4/18, throbbing/shooting pain in back of neck, occipital region of skull    Description:   Do you feel faint:  YES  Does it feel like the surroundings (bed, room) are moving: YES  Unsteady/off balance: YES  Have you passed out or fallen: no     Intensity: severe - last night, moderate today    Progression of Symptoms:  worsening    Accompanying Signs & Symptoms:  Heart palpitations: YES - exertion  Nausea, vomiting: YES  Weakness in arms or legs: no   Fatigue: YES  - hasn't been sleeping well  Vision or speech changes: YES- double vision yesterday, nothing current -- vision seems darker  Ringing in ears (Tinnitus): no   Hearing Loss: no     History:   Head trauma/concussion hx: no   Previous similar symptoms: YES- septic last year (GONZÁLEZ Jacintafroilan, 3/13/17)  Recent bleeding history: no     Precipitating factors:   Worse with activity or head movement: YES  Any new medications (BP?): YES- methocarbamol, minocycline   Alcohol/drug abuse/withdrawal: no     Alleviating factors:   Does staying in a fixed position give relief:  YES- improves    Therapies Tried and outcome: None      Problem list and histories reviewed & adjusted, as indicated.  Additional history: as documented    Patient Active Problem List   Diagnosis     AMA (advanced maternal age) primigravida 35+     Urinary tract infection     Past Surgical History:   Procedure Laterality Date     CONIZATION LEEP       DILATION AND CURETTAGE, HYSTEROSCOPY DIAGNOSTIC, COMBINED       DILATION AND CURETTAGE, HYSTEROSCOPY, ABLATE ENDOMETRIUM NOVASURE, COMBINED N/A 3/9/2017    Procedure: COMBINED DILATION AND CURETTAGE, HYSTEROSCOPY, ABLATE ENDOMETRIUM NOVASURE;  Surgeon: Caitlin House MD;  Location: [facility]     HEAD & NECK SURGERY      [place]  "TEETH EXTRACTED     LAPAROSCOPIC CYSTECTOMY OVARIAN (BENIGN) Right     1st trimester     LAPAROSCOPY DIAGNOSTIC (GYN)      WITH RIGHT SALPINGECTOMY, LAPAROSCOPY WITH JEM,        Social History   Substance Use Topics     Smoking status: Never Smoker     Smokeless tobacco: Never Used     Alcohol use No     History reviewed. No pertinent family history.      Current Outpatient Prescriptions   Medication Sig Dispense Refill     isometheptene-dichloralphenazone-acetaminophen (MIDRIN) -325 MG per capsule Take 1 capsule by mouth every 4 hours as needed for headaches       meclizine (ANTIVERT) 25 MG tablet Take 1 tablet (25 mg) by mouth every 6 hours as needed for dizziness 30 tablet 0     methocarbamol (ROBAXIN) 500 MG tablet Take 500 mg by mouth 4 times daily as needed for muscle spasms       methylPREDNISolone (MEDROL DOSEPAK) 4 MG tablet Follow package instructions 21 tablet 0     minocycline (MINOCIN/DYNACIN) 100 MG capsule Take 100 mg by mouth 2 times daily       Naproxen Sodium (ALEVE PO) Take 220 mg by mouth daily as needed for moderate pain        SUMAtriptan Succinate (IMITREX PO) Take 100 mg by mouth once as needed for migraine (MAY REPEAT IN 2 HOURS)       TRAMADOL HCL PO Take  mg by mouth every 6 hours as needed for moderate to severe pain        No Known Allergies    Reviewed and updated as needed this visit by clinical staff  Tobacco  Allergies  Meds  Med Hx  Surg Hx  Fam Hx  Soc Hx      Reviewed and updated as needed this visit by Provider         ROS:  All others are negative except as above.      OBJECTIVE:     /83  Pulse 102  Temp 97.9  F (36.6  C) (Oral)  Resp 16  Ht 5' 8\" (1.727 m)  Wt 171 lb 6.4 oz (77.7 kg)  SpO2 98%  BMI 26.06 kg/m2  Body mass index is 26.06 kg/(m^2).  GENERAL: healthy, alert and no distress  NECK: no adenopathy, no asymmetry, masses, or scars and thyroid normal to palpation  RESP: lungs clear to auscultation - no rales, rhonchi or wheezes  CV: regular " rate and rhythm, normal S1 S2, no S3 or S4, no murmur, click or rub, no peripheral edema and peripheral pulses strong  MS: no gross musculoskeletal defects noted, no edema  NEURO: Normal strength and tone, sensory exam grossly normal, mentation intact and cranial nerves 2-12 intact  PSYCH: mentation appears normal, affect normal/bright    Diagnostic Test Results:  Results for orders placed or performed in visit on 09/07/18   CBC with platelets   Result Value Ref Range    WBC 4.7 4.0 - 11.0 10e9/L    RBC Count 4.12 3.8 - 5.2 10e12/L    Hemoglobin 12.9 11.7 - 15.7 g/dL    Hematocrit 39.4 35.0 - 47.0 %    MCV 96 78 - 100 fl    MCH 31.3 26.5 - 33.0 pg    MCHC 32.7 31.5 - 36.5 g/dL    RDW 12.0 10.0 - 15.0 %    Platelet Count 261 150 - 450 10e9/L         ASSESSMENT/PLAN:     Vikki was seen today for dizziness.    Diagnoses and all orders for this visit:    Vertigo; differentials to include vestibular neuronitis  -     Orthostatics  -     CBC with platelets  -     methylPREDNISolone (MEDROL DOSEPAK) 4 MG tablet; Follow package instructions  -     meclizine (ANTIVERT) 25 MG tablet; Take 1 tablet (25 mg) by mouth every 6 hours as needed for dizziness      Patient education and Handout given       Follow up if symptoms fail to improve or worsen.      The patient was in agreement with the plan today and had no questions or concerns prior to leaving the clinic.      Yamile Coronel MD  [facility]

## 2018-10-09 ENCOUNTER — ALLIED HEALTH/NURSE VISIT (OUTPATIENT)
Dept: NURSING | Facility: CLINIC | Age: 38
End: 2018-10-09
Payer: COMMERCIAL

## 2018-10-09 DIAGNOSIS — Z23 NEED FOR PROPHYLACTIC VACCINATION AND INOCULATION AGAINST INFLUENZA: Primary | ICD-10-CM

## 2018-10-09 PROCEDURE — 90686 IIV4 VACC NO PRSV 0.5 ML IM: CPT

## 2018-10-09 PROCEDURE — 99207 ZZC NO CHARGE NURSE ONLY: CPT

## 2018-10-09 PROCEDURE — 90471 IMMUNIZATION ADMIN: CPT

## 2018-10-09 NOTE — MR AVS SNAPSHOT
After Visit Summary   10/9/2018    Vikki Merlos    MRN: [id number]           Patient Information     Date Of Birth          1980        Visit Information        Provider Department      10/9/2018 9:45 AM [facility] [place]        Today's Diagnoses     Need for prophylactic vaccination and inoculation against influenza    -  1       Follow-ups after your visit        Your next 10 appointments already scheduled     Oct 09, 2018  9:45 AM CDT   Nurse Only with [facility]   [facility] [place] ([facility] [place])    [address]  [place] [address] [phone]              Who to contact     If you have questions or need follow up information about today's clinic visit or your schedule please contact [facility] [place] directly at [phone].  Normal or non-critical lab and imaging results will be communicated to you by MyChart, letter or phone within 4 business days after the clinic has received the results. If you do not hear from us within 7 days, please contact the clinic through MyChart or phone. If you have a critical or abnormal lab result, we will notify you by phone as soon as possible.  Submit refill requests through [email or web address] or call your pharmacy and they will forward the refill request to us. Please allow 3 business days for your refill to be completed.          Additional Information About Your Visit        Care EveryWhere ID     This is your Care EveryWhere ID. This could be used by other organizations to access your [place] medical records  [id number]         Blood Pressure from Last 3 Encounters:   09/07/18 142/83   03/16/17 132/89   03/09/17 129/82    Weight from Last 3 Encounters:   09/07/18 171 lb 6.4 oz (77.7 kg)   03/16/17 189 lb 13.1 oz (86.1 kg)   03/09/17 166 lb 1.6 oz (75.3 kg)              We Performed the Following     FLU VACCINE, SPLIT VIRUS, IM (QUADRIVALENT) [59744]- >3 YRS     Vaccine Administration, Initial  [61037]        Primary Care Provider Office Phone # Fax #    [facility] [phone]       [address] [facility] [address]        Equal Access to Services     AMANDA JACOBO : Hadii aad ku hadasho Soomaali, waaxda luqadaha, qaybta kaalmada adeegyada, waxarthur gonzalezn shi henriquez laJodielakisha silva. So [facility] [phone].    ATENCIÓN: Si habla español, tiene a wheeler disposición servicios gratuitos de asistencia lingüística. Llame al [phone].    We comply with applicable federal civil rights laws and [place] laws. We do not discriminate on the basis of race, color, national origin, age, disability, sex, sexual orientation, or gender identity.            Thank you!     Thank you for choosing [facility]  for your care. Our goal is always to provide you with excellent care. Hearing back from our patients is one way we can continue to improve our services. Please take a few minutes to complete the written survey that you may receive in the mail after your visit with us. Thank you!             Your Updated Medication List - Protect others around you: Learn how to safely use, store and throw away your medicines at www.disposemymeds.org.          This list is accurate as of 10/9/18  9:36 AM.  Always use your most recent med list.                   Brand Name Dispense Instructions for use Diagnosis    ALEVE PO      Take 220 mg by mouth daily as needed for moderate pain        IMITREX PO      Take 100 mg by mouth once as needed for migraine (MAY REPEAT IN 2 HOURS)        isometheptene-dichloralphenazone-acetaminophen -325 MG per capsule    MIDRIN     Take 1 capsule by mouth every 4 hours as needed for headaches        meclizine 25 MG tablet    ANTIVERT    30 tablet    Take 1 tablet (25 mg) by mouth every 6 hours as needed for dizziness    Vertigo       methocarbamol 500 MG tablet    ROBAXIN     Take 500 mg by mouth 4 times daily as needed for muscle spasms         methylPREDNISolone 4 MG tablet    MEDROL DOSEPAK    21 tablet    Follow package instructions    Vertigo       minocycline 100 MG capsule    MINOCIN/DYNACIN     Take 100 mg by mouth 2 times daily        TRAMADOL HCL PO      Take  mg by mouth every 6 hours as needed for moderate to severe pain

## 2018-10-22 ENCOUNTER — TRANSFERRED RECORDS (OUTPATIENT)
Dept: HEALTH INFORMATION MANAGEMENT | Facility: CLINIC | Age: 38
End: 2018-10-22

## 2018-10-22 ENCOUNTER — OFFICE VISIT (OUTPATIENT)
Dept: FAMILY MEDICINE | Facility: CLINIC | Age: 38
End: 2018-10-22
Payer: COMMERCIAL

## 2018-10-22 VITALS
RESPIRATION RATE: 16 BRPM | SYSTOLIC BLOOD PRESSURE: 118 MMHG | BODY MASS INDEX: 25.85 KG/M2 | OXYGEN SATURATION: 100 % | TEMPERATURE: 97 F | WEIGHT: 170 LBS | HEART RATE: 67 BPM | DIASTOLIC BLOOD PRESSURE: 78 MMHG

## 2018-10-22 DIAGNOSIS — M25.512 ACUTE PAIN OF LEFT SHOULDER: Primary | ICD-10-CM

## 2018-10-22 PROCEDURE — 99214 OFFICE O/P EST MOD 30 MIN: CPT | Performed by: NURSE PRACTITIONER

## 2018-10-22 RX ORDER — PREDNISONE 20 MG/1
20 TABLET ORAL 2 TIMES DAILY
Qty: 10 TABLET | Refills: 0 | Status: SHIPPED | OUTPATIENT
Start: 2018-10-22 | End: 2019-02-06

## 2018-10-22 ASSESSMENT — PAIN SCALES - GENERAL: PAINLEVEL: SEVERE PAIN (7)

## 2018-10-22 NOTE — NURSING NOTE
"Chief Complaint   Patient presents with     Musculoskeletal Problem     left arm/shoulder pain and numbness and tingling       Initial /78  Pulse 67  Temp 97  F (36.1  C) (Oral)  Resp 16  Wt 170 lb (77.1 kg)  SpO2 100%  BMI 25.85 kg/m2 Estimated body mass index is 25.85 kg/(m^2) as calculated from the following:    Height as of 9/7/18: 5' 8\" (1.727 m).    Weight as of this encounter: 170 lb (77.1 kg).  Medication Reconciliation: complete  Anne Shaikh M.A.    "

## 2018-10-22 NOTE — MR AVS SNAPSHOT
After Visit Summary   10/22/2018    Vikki Merlos    MRN: [id number]           Patient Information     Date Of Birth          1980        Visit Information        Provider Department      10/22/2018 2:00 PM Mayuri Dominique APRN [facility]        Today's Diagnoses     Acute pain of left shoulder    -  1      Care Instructions      Understanding Shoulder Impingement Syndrome    Shoulder impingement syndrome is a problem with the shoulder joint. It occurs when certain parts within the joint swell and are pinched. This can cause nagging pain and problems with moving the arm.  What causes shoulder impingement syndrome?  It is possible to develop impingement after years of normal shoulder use. But in most cases the condition occurs because of repeated overhead movements. These include such things as stocking shelves, painting, swimming, and throwing. These movements can irritate parts of the shoulder, leading to swelling. Swollen parts of the shoulder take up more room, making the joint space smaller. Some parts that may be involved include:    A sac of fluid (bursa) that cushions the shoulder joint.  When the bursa is irritated, it may lead to a condition called bursitis. This is when the bursa swells with fluid, filling and squeezing the joint space.    Fibrous tissues (tendons) that connect muscle to bone. When tendons are irritated, they may become swollen. This is called tendonitis.    The end (acromion) of the shoulder blade. This bone may be flat or hooked. If the acromion is hooked, the joint space may be smaller than normal. Growths (bone spurs) on the acromion can also narrow the joint space. Acromion problems don t often cause impingement. But they can make it worse.  Symptoms of shoulder impingement syndrome  Symptoms include pain, pinching, or stiffness in your shoulder. Pain often comes with movement, particularly reaching overhead or backward. It may also be  felt when the shoulder is at rest. Pain at night during sleep is common.  Treatment for shoulder impingement syndrome  Treatment will depend on the cause of the problem, how bad the problem is, and if other parts of the shoulder are damaged. Treatment may include the following:    Active rest. This allows the shoulder to heal. It means using the arm and shoulder, but avoiding activities that cause pain. These likely include reaching overhead or sleeping on your shoulder.    Cold packs. These help reduce swelling and relieve pain.    Prescription or over-the-counter pain medicines. These help relieve pain and swelling.    Arm and shoulder exercises. These help keep your shoulder joint mobile as it heals. They also help improve muscle strength around the joint.    Shots of medicine into the joint. This can help reduce swelling and pain for a short time.  If other measures don t work to relieve symptoms, you may need surgery. This opens up space in the joint to allow pain-free motion.  Possible complications of shoulder impingement syndrome  It might be tempting to stop using your shoulder completely to avoid pain. But doing so may lead to a condition called frozen shoulder. To help prevent this, follow instructions you are given for active rest and for doing exercises to help your shoulder heal.  When to call your healthcare provider  Call your healthcare provider right away if you have any of these:    Fever of 100.4 F (38 C) or higher, or as directed    Symptoms that don t get better, or get worse    New symptoms   Date Last Reviewed: 3/10/2016    3287-0419 [employer]. [address] [place] [address], [place] [address]. All rights reserved. This information is not intended as a substitute for professional medical care. Always follow your healthcare professional's instructions.                Follow-ups after your visit        Future tests that were ordered for you today     Open Future Orders        Priority  Expected Expires Ordered    MR Shoulder Left w/o & w Contrast STAT  10/22/2019 10/22/2018            Who to contact     If you have questions or need follow up information about today's clinic visit or your schedule please contact [facility] directly at [phone].  Normal or non-critical lab and imaging results will be communicated to you by MyChart, letter or phone within 4 business days after the clinic has received the results. If you do not hear from us within 7 days, please contact the clinic through MyChart or phone. If you have a critical or abnormal lab result, we will notify you by phone as soon as possible.  Submit refill requests through [employer] or call your pharmacy and they will forward the refill request to us. Please allow 3 business days for your refill to be completed.          Additional Information About Your Visit        Care EveryWhere ID     This is your Care EveryWhere ID. This could be used by other organizations to access your [place] medical records  [id number]        Your Vitals Were     Pulse Temperature Respirations Pulse Oximetry BMI (Body Mass Index)       67 97  F (36.1  C) (Oral) 16 100% 25.85 kg/m2        Blood Pressure from Last 3 Encounters:   10/22/18 118/78   09/07/18 142/83   03/16/17 132/89    Weight from Last 3 Encounters:   10/22/18 170 lb (77.1 kg)   09/07/18 171 lb 6.4 oz (77.7 kg)   03/16/17 189 lb 13.1 oz (86.1 kg)                 Today's Medication Changes          These changes are accurate as of 10/22/18  2:34 PM.  If you have any questions, ask your nurse or doctor.               Start taking these medicines.        Dose/Directions    predniSONE 20 MG tablet   Commonly known as:  DELTASONE   Used for:  Acute pain of left shoulder   Started by:  Mayuri Dominique APRN CNP        Dose:  20 mg   Take 1 tablet (20 mg) by mouth 2 times daily for 5 days   Quantity:  10 tablet   Refills:  0            Where to get your medicines      These medications  were sent to [facility] [address]  [address] [facility], [facility] [address], [facility] [address]     Phone:  [phone]     predniSONE 20 MG tablet                Primary Care Provider Office Phone # Fax #    [facility] [phone] [address] [facility] [address] [facility] [address]        Equal Access to Services     AMANDA JACOBO : Hadii aad ku hadasho Soomaali, waaxda luqadaha, qaybta kaalmada adeegyada, waxay idiin hayaan adeeg kharash la'brentn . So [facility] [phone].    ATENCIÓN: Si habla español, tiene a wheeler disposición servicios gratuitos de asistencia lingüística. Carlitaame al [phone].    We comply with applicable federal civil rights laws and [place] laws. We do not discriminate on the basis of race, color, national origin, age, disability, sex, sexual orientation, or gender identity.            Thank you!     Thank you for choosing [facility]  for your care. Our goal is always to provide you with excellent care. Hearing back from our patients is one way we can continue to improve our services. Please take a few minutes to complete the written survey that you may receive in the mail after your visit with us. Thank you!             Your Updated Medication List - Protect others around you: Learn how to safely use, store and throw away your medicines at www.disposemymeds.org.          This list is accurate as of 10/22/18  2:34 PM.  Always use your most recent med list.                   Brand Name Dispense Instructions for use Diagnosis    ALEVE PO      Take 220 mg by mouth daily as needed for moderate pain        IMITREX PO      Take 100 mg by mouth once as needed for migraine (MAY REPEAT IN 2 HOURS)        isometheptene-dichloralphenazone-acetaminophen -325 MG per capsule    MIDRIN     Take 1 capsule by mouth every 4 hours as needed for headaches        predniSONE 20 MG tablet    DELTASONE    10 tablet    Take 1 tablet (20 mg) by  mouth 2 times daily for 5 days    Acute pain of left shoulder       TRAMADOL HCL PO      Take  mg by mouth every 6 hours as needed for moderate to severe pain

## 2018-10-22 NOTE — PROGRESS NOTES
SUBJECTIVE:   Vikki Merlos is a 38 year old female who presents to clinic today for the following health issues:    Musculoskeletal problem/pain      Duration: 3 weeks    Description  Location: left shoulder-arm    Intensity:  moderate    Accompanying signs and symptoms: radiation of pain to lower arm, numbness and tingling    History  Previous similar problem: no   Previous evaluation:  none    Precipitating or alleviating factors:  Trauma or overuse: no   Aggravating factors include: sleeps on that side    Therapies tried and outcome: rest/inactivity, heat and NSAID - no relief  Denies any neuro deficits-no chest pain sob heart racing, vomiting, headache, injury    Problem list and histories reviewed & adjusted, as indicated.  Additional history: as documented    Patient Active Problem List   Diagnosis     AMA (advanced maternal age) primigravida 35+     Urinary tract infection     Past Surgical History:   Procedure Laterality Date     CONIZATION LEEP       DILATION AND CURETTAGE, HYSTEROSCOPY DIAGNOSTIC, COMBINED       DILATION AND CURETTAGE, HYSTEROSCOPY, ABLATE ENDOMETRIUM NOVASURE, COMBINED N/A 3/9/2017    Procedure: COMBINED DILATION AND CURETTAGE, HYSTEROSCOPY, ABLATE ENDOMETRIUM NOVASURE;  Surgeon: Caitlin House MD;  Location: [facility]     HEAD & NECK SURGERY      WISDOM TEETH EXTRACTED     LAPAROSCOPIC CYSTECTOMY OVARIAN (BENIGN) Right     1st trimester     LAPAROSCOPY DIAGNOSTIC (GYN)      WITH RIGHT SALPINGECTOMY, LAPAROSCOPY WITH JEM,        Social History   Substance Use Topics     Smoking status: Never Smoker     Smokeless tobacco: Never Used     Alcohol use No     History reviewed. No pertinent family history.        Reviewed and updated as needed this visit by clinical staff  Tobacco  Allergies  Meds  Med Hx  Surg Hx  Fam Hx  Soc Hx      Reviewed and updated as needed this visit by Provider         ROS:  Constitutional, HEENT, cardiovascular, pulmonary, GI, , musculoskeletal,  neuro, skin, endocrine and psych systems are negative, except as otherwise noted.    OBJECTIVE:     /78  Pulse 67  Temp 97  F (36.1  C) (Oral)  Resp 16  Wt 170 lb (77.1 kg)  SpO2 100%  BMI 25.85 kg/m2  Body mass index is 25.85 kg/(m^2).  GENERAL: healthy, alert and no distress  EYES: Eyes grossly normal to inspection, PERRL and conjunctivae and sclerae normal  HENT: ear canals and TM's normal, nose and mouth without ulcers or lesions  NECK: no adenopathy, no asymmetry, masses, or scars and thyroid normal to palpation  RESP: lungs clear to auscultation - no rales, rhonchi or wheezes  CV: regular rate and rhythm, normal S1 S2, no S3 or S4, no murmur, click or rub, no peripheral edema and peripheral pulses strong  Shoulder exam - both sides normal; full range of motion, no pain on motion, no deformity noted. Left shoulder tenderness in posterior subscapular  SKIN: no suspicious lesions or rashes, normal color and circulation  NEURO: Normal strength and tone, mentation intact and speech normal    MRI pending, will notify when results available    ASSESSMENT/PLAN:     1. Acute pain of left shoulder  No red flag symptoms on exam, if occur to ER as discussed    - predniSONE (DELTASONE) 20 MG tablet; Take 1 tablet (20 mg) by mouth 2 times daily for 5 days  Dispense: 10 tablet; Refill: 0    Heat, ice, stretching, home care discussed  Follow up with neurology for numbness as needed  Can add PT referral if needed      FIONA Nguyen [facility]

## 2018-10-22 NOTE — PATIENT INSTRUCTIONS
Understanding Shoulder Impingement Syndrome    Shoulder impingement syndrome is a problem with the shoulder joint. It occurs when certain parts within the joint swell and are pinched. This can cause nagging pain and problems with moving the arm.  What causes shoulder impingement syndrome?  It is possible to develop impingement after years of normal shoulder use. But in most cases the condition occurs because of repeated overhead movements. These include such things as stocking shelves, painting, swimming, and throwing. These movements can irritate parts of the shoulder, leading to swelling. Swollen parts of the shoulder take up more room, making the joint space smaller. Some parts that may be involved include:    A sac of fluid (bursa) that cushions the shoulder joint.  When the bursa is irritated, it may lead to a condition called bursitis. This is when the bursa swells with fluid, filling and squeezing the joint space.    Fibrous tissues (tendons) that connect muscle to bone. When tendons are irritated, they may become swollen. This is called tendonitis.    The end (acromion) of the shoulder blade. This bone may be flat or hooked. If the acromion is hooked, the joint space may be smaller than normal. Growths (bone spurs) on the acromion can also narrow the joint space. Acromion problems don t often cause impingement. But they can make it worse.  Symptoms of shoulder impingement syndrome  Symptoms include pain, pinching, or stiffness in your shoulder. Pain often comes with movement, particularly reaching overhead or backward. It may also be felt when the shoulder is at rest. Pain at night during sleep is common.  Treatment for shoulder impingement syndrome  Treatment will depend on the cause of the problem, how bad the problem is, and if other parts of the shoulder are damaged. Treatment may include the following:    Active rest. This allows the shoulder to heal. It means using the arm and shoulder, but avoiding  activities that cause pain. These likely include reaching overhead or sleeping on your shoulder.    Cold packs. These help reduce swelling and relieve pain.    Prescription or over-the-counter pain medicines. These help relieve pain and swelling.    Arm and shoulder exercises. These help keep your shoulder joint mobile as it heals. They also help improve muscle strength around the joint.    Shots of medicine into the joint. This can help reduce swelling and pain for a short time.  If other measures don t work to relieve symptoms, you may need surgery. This opens up space in the joint to allow pain-free motion.  Possible complications of shoulder impingement syndrome  It might be tempting to stop using your shoulder completely to avoid pain. But doing so may lead to a condition called frozen shoulder. To help prevent this, follow instructions you are given for active rest and for doing exercises to help your shoulder heal.  When to call your healthcare provider  Call your healthcare provider right away if you have any of these:    Fever of 100.4 F (38 C) or higher, or as directed    Symptoms that don t get better, or get worse    New symptoms   Date Last Reviewed: 3/10/2016    0645-1970 [employer]. [address] [place] [address]. All rights reserved. This information is not intended as a substitute for professional medical care. Always follow your healthcare professional's instructions.

## 2018-10-25 ENCOUNTER — TELEPHONE (OUTPATIENT)
Dept: FAMILY MEDICINE | Facility: CLINIC | Age: 38
End: 2018-10-25

## 2018-10-25 ENCOUNTER — NURSE TRIAGE (OUTPATIENT)
Dept: NURSING | Facility: CLINIC | Age: 38
End: 2018-10-25

## 2018-10-25 DIAGNOSIS — M25.512 ACUTE PAIN OF LEFT SHOULDER: Primary | ICD-10-CM

## 2018-10-25 NOTE — TELEPHONE ENCOUNTER
Clinic Action Needed:Yes, Please call patient at    Reason for Call:Patient calling requesting results from MRI of her left shoulder that was ordered on 10/22/18. Patient was sent to [facility] in [place].  Please call patient with results.    Nakia Kauffman RN  [facility]

## 2018-10-25 NOTE — TELEPHONE ENCOUNTER
Called and informed patient of normal results. Patient is still having symptoms, but she is still on the steroids. Should she wait until she is done with these before following up? She said that the symptoms now are more off and on. Please advise.Bisi Robertson MA/DESIREE

## 2018-10-25 NOTE — TELEPHONE ENCOUNTER
If symptoms are improving she may wait a few more days to watch for improvement  Please also give her directions and hours to Sports/Ortho walk-in in [place] if she would like to see orthopedics if she has any concerns with this knee pain.  Thanks  Judith Foss M.D.

## 2018-10-25 NOTE — TELEPHONE ENCOUNTER
RN contacted pt and notified pt of provider plan in note below as written. Pt states sx overall are improving, but she doesn't know what to expect or if the sx will completely resolve. Pt states she will follow-up with her Neurologist next week.    Pt will make an Ortho appointment if available next week as well, and cancel if sx do completely resolve. Please sign pending Ortho referral since Ortho was recommended in provider note below. Pt provided with Ortho scheduling number as needed.    Delicia Causey RN

## 2018-10-25 NOTE — TELEPHONE ENCOUNTER
To provider please review MRI results were received will place in Same day providers basket. Images were pushed thru as well told by [facility]. Patient wanting results. See message below.  DESIREE Antonio

## 2018-10-25 NOTE — TELEPHONE ENCOUNTER
Clinic Action Needed:Yes, Please call patient at    Reason for Call:Patient calling requesting results from MRI of her left shoulder that was ordered on 10/22/18. Patient was sent to [facility] in [place].  Please call patient with results.    Nakia Kauffman RN  [facility]

## 2018-10-25 NOTE — TELEPHONE ENCOUNTER
Please inform MRI of the shoulder is normal.  Recommend follow up if symptoms persist  Judith Foss M.D.

## 2018-12-14 ENCOUNTER — OFFICE VISIT (OUTPATIENT)
Dept: FAMILY MEDICINE | Facility: CLINIC | Age: 38
End: 2018-12-14
Payer: COMMERCIAL

## 2018-12-14 VITALS
TEMPERATURE: 98.2 F | BODY MASS INDEX: 25.45 KG/M2 | SYSTOLIC BLOOD PRESSURE: 109 MMHG | DIASTOLIC BLOOD PRESSURE: 77 MMHG | WEIGHT: 167.4 LBS | HEART RATE: 72 BPM | OXYGEN SATURATION: 100 % | RESPIRATION RATE: 18 BRPM

## 2018-12-14 DIAGNOSIS — Z87.898 H/O MOTION SICKNESS: Primary | ICD-10-CM

## 2018-12-14 PROCEDURE — 99213 OFFICE O/P EST LOW 20 MIN: CPT | Performed by: PHYSICIAN ASSISTANT

## 2018-12-14 RX ORDER — SCOLOPAMINE TRANSDERMAL SYSTEM 1 MG/1
1 PATCH, EXTENDED RELEASE TRANSDERMAL
Qty: 6 PATCH | Refills: 0 | Status: SHIPPED | OUTPATIENT
Start: 2018-12-14 | End: 2019-02-06

## 2018-12-14 RX ORDER — ONDANSETRON 4 MG/1
4-8 TABLET, ORALLY DISINTEGRATING ORAL EVERY 6 HOURS PRN
Qty: 20 TABLET | Refills: 0 | Status: SHIPPED | OUTPATIENT
Start: 2018-12-14 | End: 2019-02-06

## 2018-12-14 NOTE — PROGRESS NOTES
zor  SUBJECTIVE:   Vikki Merlos is a 38 year old female who presents to clinic today for the following health issues:      Chief Complaint   Patient presents with     Requesting Medication     Nausea Patches for travel     Health Maintenance     PAP     Going on a cruise in January ~10 days  Endorses motion sickness when on boats, nausea symptoms.       Problem list and histories reviewed & adjusted, as indicated.  Additional history: as documented    Patient Active Problem List   Diagnosis     AMA (advanced maternal age) primigravida 35+     Urinary tract infection     Past Surgical History:   Procedure Laterality Date     CONIZATION LEEP       DILATION AND CURETTAGE, HYSTEROSCOPY DIAGNOSTIC, COMBINED       DILATION AND CURETTAGE, HYSTEROSCOPY, ABLATE ENDOMETRIUM NOVASURE, COMBINED N/A 3/9/2017    Procedure: COMBINED DILATION AND CURETTAGE, HYSTEROSCOPY, ABLATE ENDOMETRIUM NOVASURE;  Surgeon: Caitlin House MD;  Location: [facility]     HEAD & NECK SURGERY      WISDOM TEETH EXTRACTED     LAPAROSCOPIC CYSTECTOMY OVARIAN (BENIGN) Right     1st trimester     LAPAROSCOPY DIAGNOSTIC (GYN)      WITH RIGHT SALPINGECTOMY, LAPAROSCOPY WITH JEM,        Social History     Tobacco Use     Smoking status: Never Smoker     Smokeless tobacco: Never Used   Substance Use Topics     Alcohol use: No     History reviewed. No pertinent family history.        Reviewed and updated as needed this visit by clinical staff       Reviewed and updated as needed this visit by Provider         ROS:  Constitutional systems are negative, except as otherwise noted.    OBJECTIVE:                                                    /77   Pulse 72   Temp 98.2  F (36.8  C) (Oral)   Resp 18   Wt 75.9 kg (167 lb 6.4 oz)   SpO2 100%   BMI 25.45 kg/m    Body mass index is 25.45 kg/m .  GENERAL APPEARANCE: healthy, alert and no distress  MS: extremities normal- no gross deformities noted  NEURO: Normal strength and tone  PSYCH: mentation  appears normal and affect normal/bright       ASSESSMENT:                                                      1. H/O motion sickness         PLAN:                                                    Scopolamine and Zofran PRN.     The patient was in agreement with the plan today and had no questions or concerns prior to leaving the clinic.     Maribel Corado PA-C  [facility]

## 2019-02-06 ENCOUNTER — OFFICE VISIT (OUTPATIENT)
Dept: INTERNAL MEDICINE | Facility: CLINIC | Age: 39
End: 2019-02-06
Payer: COMMERCIAL

## 2019-02-06 VITALS
OXYGEN SATURATION: 98 % | WEIGHT: 172.6 LBS | RESPIRATION RATE: 16 BRPM | BODY MASS INDEX: 26.24 KG/M2 | TEMPERATURE: 97.6 F | SYSTOLIC BLOOD PRESSURE: 107 MMHG | DIASTOLIC BLOOD PRESSURE: 69 MMHG | HEART RATE: 92 BPM

## 2019-02-06 DIAGNOSIS — H00.11 CHALAZION RIGHT UPPER EYELID: Primary | ICD-10-CM

## 2019-02-06 PROCEDURE — 99213 OFFICE O/P EST LOW 20 MIN: CPT | Performed by: INTERNAL MEDICINE

## 2019-02-06 ASSESSMENT — PAIN SCALES - GENERAL: PAINLEVEL: MILD PAIN (3)

## 2019-02-06 NOTE — PROGRESS NOTES
SUBJECTIVE:   Vikki Merlos is a 38 year old female who presents to clinic today for the following health issues:      Eye(s) Problem      Duration: Started yesterday morning 2/5/19    Description:  Location: right  Pain: YES  Redness: YES- on eyelid, not on the eyeball  Discharge: no    Accompanying signs and symptoms: Swelling    History (Trauma, foreign body exposure,): None    Precipitating or alleviating factors (contact use): hot packs, didn't seem to help much    Therapies tried and outcome: None    Used heating pad with layer over it last night for 45 min.  No vision changes, new headache (mild migraine symptoms currently, within patient baseline per her report), facial swelling or drooping otherwise.  No fever, chills, or night sweats.  Got back from cruise yesterday, no new contact chemicals, soaps, lotions, dust, occupational exposures.    1. Chalazion right upper eyelid        PMH: Updated and/or reviewed in chart.    ROS:  Constitutional, HEENT systems are otherwise negative.    OBJECTIVE:                                                    /69   Pulse 92   Temp 97.6  F (36.4  C) (Tympanic)   Resp 16   Wt 78.3 kg (172 lb 9.6 oz)   SpO2 98%   BMI 26.24 kg/m      GEN: No acute distress  EYES: No conjunctival injection or icterus, EOMI grossly intact, lids and periorbital tissues normal except moderate edema of right upper lid with central/lower area of erythema and mild tenderness to palpation without purulence or clear body of infection/inflammation  RESP: Unlabored, regular  NEURO: Normal gait, MAEx4, light touch sensation grossly intact  PSYCH: Normal mood and affect      Results for orders placed or performed in visit on 09/07/18   CBC with platelets   Result Value Ref Range    WBC 4.7 4.0 - 11.0 10e9/L    RBC Count 4.12 3.8 - 5.2 10e12/L    Hemoglobin 12.9 11.7 - 15.7 g/dL    Hematocrit 39.4 35.0 - 47.0 %    MCV 96 78 - 100 fl    MCH 31.3 26.5 - 33.0 pg    MCHC 32.7 31.5 - 36.5 g/dL     RDW 12.0 10.0 - 15.0 %    Platelet Count 261 150 - 450 10e9/L      ASSESSMENT/PLAN:                                                    1. Chalazion right upper eyelid  We discussed chalazion vs hordeolum vs periorbital cellulitis.  No clear risk factors other than possible mild overuse of heating pad yesterday.  Doubt allergic process.  Delayed antibiotic appropriate if not developing/progressing per sty protocol with more limited duration and increased frequency of warm compresses today and tomorrow.  If not improving despite antibiotic, discussed needs to have urgent evaluation with ophthalmology.  - amoxicillin-clavulanate (AUGMENTIN) 875-125 MG tablet; Take 1 tablet by mouth 2 times daily for 7 days  Dispense: 14 tablet; Refill: 0       Orders Placed This Encounter     amoxicillin-clavulanate (AUGMENTIN) 875-125 MG tablet              Arnav Zepeda MD

## 2019-02-21 ENCOUNTER — TRANSFERRED RECORDS (OUTPATIENT)
Dept: HEALTH INFORMATION MANAGEMENT | Facility: CLINIC | Age: 39
End: 2019-02-21

## 2019-09-17 ENCOUNTER — ANCILLARY PROCEDURE (OUTPATIENT)
Dept: MAMMOGRAPHY | Facility: CLINIC | Age: 39
End: 2019-09-17
Attending: OBSTETRICS & GYNECOLOGY
Payer: COMMERCIAL

## 2019-09-17 DIAGNOSIS — Z12.31 VISIT FOR SCREENING MAMMOGRAM: ICD-10-CM

## 2019-09-17 PROCEDURE — 77067 SCR MAMMO BI INCL CAD: CPT

## 2019-09-17 PROCEDURE — 77063 BREAST TOMOSYNTHESIS BI: CPT

## 2019-10-10 ENCOUNTER — APPOINTMENT (OUTPATIENT)
Age: 39
Setting detail: DERMATOLOGY
End: 2019-10-13

## 2019-10-10 VITALS — HEIGHT: 68 IN | RESPIRATION RATE: 16 BRPM | WEIGHT: 168 LBS

## 2019-10-10 DIAGNOSIS — L70.0 ACNE VULGARIS: ICD-10-CM

## 2019-10-10 DIAGNOSIS — D22 MELANOCYTIC NEVI: ICD-10-CM

## 2019-10-10 DIAGNOSIS — D18.0 HEMANGIOMA: ICD-10-CM

## 2019-10-10 DIAGNOSIS — L82.1 OTHER SEBORRHEIC KERATOSIS: ICD-10-CM

## 2019-10-10 PROBLEM — D22.5 MELANOCYTIC NEVI OF TRUNK: Status: ACTIVE | Noted: 2019-10-10

## 2019-10-10 PROBLEM — D22.61 MELANOCYTIC NEVI OF RIGHT UPPER LIMB, INCLUDING SHOULDER: Status: ACTIVE | Noted: 2019-10-10

## 2019-10-10 PROBLEM — D18.01 HEMANGIOMA OF SKIN AND SUBCUTANEOUS TISSUE: Status: ACTIVE | Noted: 2019-10-10

## 2019-10-10 PROCEDURE — OTHER ADDITIONAL NOTES: OTHER

## 2019-10-10 PROCEDURE — OTHER PRESCRIPTION: OTHER

## 2019-10-10 PROCEDURE — OTHER COUNSELING: OTHER

## 2019-10-10 PROCEDURE — 99214 OFFICE O/P EST MOD 30 MIN: CPT

## 2019-10-10 RX ORDER — SPIRONOLACTONE 50 MG/1
50MG TABLET, FILM COATED ORAL QD
Qty: 60 | Refills: 1 | Status: ERX | COMMUNITY
Start: 2019-10-10

## 2019-10-10 ASSESSMENT — LOCATION DETAILED DESCRIPTION DERM
LOCATION DETAILED: RIGHT BUTTOCK
LOCATION DETAILED: RIGHT INFERIOR LATERAL MIDBACK
LOCATION DETAILED: RIGHT ANTERIOR SHOULDER
LOCATION DETAILED: RIGHT INFERIOR CENTRAL MALAR CHEEK
LOCATION DETAILED: LEFT MID-UPPER BACK
LOCATION DETAILED: LEFT CLAVICULAR NECK
LOCATION DETAILED: RIGHT LATERAL ELBOW
LOCATION DETAILED: LEFT BUTTOCK
LOCATION DETAILED: RIGHT PROXIMAL RADIAL DORSAL FOREARM
LOCATION DETAILED: LEFT MEDIAL SUPERIOR CHEST
LOCATION DETAILED: SUPERIOR THORACIC SPINE
LOCATION DETAILED: LEFT FOREHEAD

## 2019-10-10 ASSESSMENT — LOCATION ZONE DERM
LOCATION ZONE: ARM
LOCATION ZONE: TRUNK
LOCATION ZONE: FACE
LOCATION ZONE: NECK

## 2019-10-10 ASSESSMENT — LOCATION SIMPLE DESCRIPTION DERM
LOCATION SIMPLE: RIGHT FOREARM
LOCATION SIMPLE: LEFT ANTERIOR NECK
LOCATION SIMPLE: RIGHT CHEEK
LOCATION SIMPLE: LEFT UPPER BACK
LOCATION SIMPLE: RIGHT ELBOW
LOCATION SIMPLE: RIGHT BUTTOCK
LOCATION SIMPLE: CHEST
LOCATION SIMPLE: LEFT FOREHEAD
LOCATION SIMPLE: RIGHT SHOULDER
LOCATION SIMPLE: LEFT BUTTOCK
LOCATION SIMPLE: UPPER BACK
LOCATION SIMPLE: RIGHT LOWER BACK

## 2019-10-10 NOTE — HPI: FULL BODY SKIN EXAMINATION
How Severe Are Your Spot(S)?: moderate
What Type Of Note Output Would You Prefer (Optional)?: Bullet Format
What Is The Reason For Today's Visit?: Full Body Skin Examination with No Concerns
What Is The Reason For Today's Visit? (Being Monitored For X): the development of new lesions

## 2019-10-10 NOTE — PROCEDURE: COUNSELING
Dapsone Pregnancy And Lactation Text: This medication is Pregnancy Category C and is not considered safe during pregnancy or breast feeding.
Detail Level: Detailed
Doxycycline Counseling:  Patient counseled regarding possible photosensitivity and increased risk for sunburn.  Patient instructed to avoid sunlight, if possible.  When exposed to sunlight, patients should wear protective clothing, sunglasses, and sunscreen.  The patient was instructed to call the office immediately if the following severe adverse effects occur:  hearing changes, easy bruising/bleeding, severe headache, or vision changes.  The patient verbalized understanding of the proper use and possible adverse effects of doxycycline.  All of the patient's questions and concerns were addressed.
High Dose Vitamin A Pregnancy And Lactation Text: High dose vitamin A therapy is contraindicated during pregnancy and breast feeding.
Doxycycline Pregnancy And Lactation Text: This medication is Pregnancy Category D and not consider safe during pregnancy. It is also excreted in breast milk but is considered safe for shorter treatment courses.
Topical Retinoid Pregnancy And Lactation Text: This medication is Pregnancy Category C. It is unknown if this medication is excreted in breast milk.
Bactrim Counseling:  I discussed with the patient the risks of sulfa antibiotics including but not limited to GI upset, allergic reaction, drug rash, diarrhea, dizziness, photosensitivity, and yeast infections.  Rarely, more serious reactions can occur including but not limited to aplastic anemia, agranulocytosis, methemoglobinemia, blood dyscrasias, liver or kidney failure, lung infiltrates or desquamative/blistering drug rashes.
Benzoyl Peroxide Pregnancy And Lactation Text: This medication is Pregnancy Category C. It is unknown if benzoyl peroxide is excreted in breast milk.
Spironolactone Pregnancy And Lactation Text: This medication can cause feminization of the male fetus and should be avoided during pregnancy. The active metabolite is also found in breast milk.
Benzoyl Peroxide Counseling: Patient counseled that medicine may cause skin irritation and bleach clothing.  In the event of skin irritation, the patient was advised to reduce the amount of the drug applied or use it less frequently.   The patient verbalized understanding of the proper use and possible adverse effects of benzoyl peroxide.  All of the patient's questions and concerns were addressed.
Birth Control Pills Counseling: Birth Control Pill Counseling: I discussed with the patient the potential side effects of OCPs including but not limited to increased risk of stroke, heart attack, thrombophlebitis, deep venous thrombosis, hepatic adenomas, breast changes, GI upset, headaches, and depression.  The patient verbalized understanding of the proper use and possible adverse effects of OCPs. All of the patient's questions and concerns were addressed.
Topical Clindamycin Counseling: Patient counseled that this medication may cause skin irritation or allergic reactions.  In the event of skin irritation, the patient was advised to reduce the amount of the drug applied or use it less frequently.   The patient verbalized understanding of the proper use and possible adverse effects of clindamycin.  All of the patient's questions and concerns were addressed.
Erythromycin Counseling:  I discussed with the patient the risks of erythromycin including but not limited to GI upset, allergic reaction, drug rash, diarrhea, increase in liver enzymes, and yeast infections.
Topical Clindamycin Pregnancy And Lactation Text: This medication is Pregnancy Category B and is considered safe during pregnancy. It is unknown if it is excreted in breast milk.
Topical Sulfur Applications Counseling: Topical Sulfur Counseling: Patient counseled that this medication may cause skin irritation or allergic reactions.  In the event of skin irritation, the patient was advised to reduce the amount of the drug applied or use it less frequently.   The patient verbalized understanding of the proper use and possible adverse effects of topical sulfur application.  All of the patient's questions and concerns were addressed.
Detail Level: Zone
Include Pregnancy/Lactation Warning?: No
Minocycline Pregnancy And Lactation Text: This medication is Pregnancy Category D and not consider safe during pregnancy. It is also excreted in breast milk.
Tazorac Counseling:  Patient advised that medication is irritating and drying.  Patient may need to apply sparingly and wash off after an hour before eventually leaving it on overnight.  The patient verbalized understanding of the proper use and possible adverse effects of tazorac.  All of the patient's questions and concerns were addressed.
Bactrim Pregnancy And Lactation Text: This medication is Pregnancy Category D and is known to cause fetal risk.  It is also excreted in breast milk.
Azithromycin Counseling:  I discussed with the patient the risks of azithromycin including but not limited to GI upset, allergic reaction, drug rash, diarrhea, and yeast infections.
Birth Control Pills Pregnancy And Lactation Text: This medication should be avoided if pregnant and for the first 30 days post-partum.
Isotretinoin Counseling: Patient should get monthly blood tests, not donate blood, not drive at night if vision affected, not share medication, and not undergo elective surgery for 6 months after tx completed. Side effects reviewed, pt to contact office should one occur.
Tazorac Pregnancy And Lactation Text: This medication is not safe during pregnancy. It is unknown if this medication is excreted in breast milk.
Topical Sulfur Applications Pregnancy And Lactation Text: This medication is Pregnancy Category C and has an unknown safety profile during pregnancy. It is unknown if this topical medication is excreted in breast milk.
High Dose Vitamin A Counseling: Side effects reviewed, pt to contact office should one occur.
Dapsone Counseling: I discussed with the patient the risks of dapsone including but not limited to hemolytic anemia, agranulocytosis, rashes, methemoglobinemia, kidney failure, peripheral neuropathy, headaches, GI upset, and liver toxicity.  Patients who start dapsone require monitoring including baseline LFTs and weekly CBCs for the first month, then every month thereafter.  The patient verbalized understanding of the proper use and possible adverse effects of dapsone.  All of the patient's questions and concerns were addressed.
Tetracycline Counseling: Patient counseled regarding possible photosensitivity and increased risk for sunburn.  Patient instructed to avoid sunlight, if possible.  When exposed to sunlight, patients should wear protective clothing, sunglasses, and sunscreen.  The patient was instructed to call the office immediately if the following severe adverse effects occur:  hearing changes, easy bruising/bleeding, severe headache, or vision changes.  The patient verbalized understanding of the proper use and possible adverse effects of tetracycline.  All of the patient's questions and concerns were addressed. Patient understands to avoid pregnancy while on therapy due to potential birth defects.
Isotretinoin Pregnancy And Lactation Text: This medication is Pregnancy Category X and is considered extremely dangerous during pregnancy. It is unknown if it is excreted in breast milk.
Erythromycin Pregnancy And Lactation Text: This medication is Pregnancy Category B and is considered safe during pregnancy. It is also excreted in breast milk.
Azithromycin Pregnancy And Lactation Text: This medication is considered safe during pregnancy and is also secreted in breast milk.
Spironolactone Counseling: Patient advised regarding risks of diarrhea, abdominal pain, hyperkalemia, birth defects (for female patients), liver toxicity and renal toxicity. The patient may need blood work to monitor liver and kidney function and potassium levels while on therapy. The patient verbalized understanding of the proper use and possible adverse effects of spironolactone.  All of the patient's questions and concerns were addressed.
Topical Retinoid counseling:  Patient advised to apply a pea-sized amount only at bedtime and wait 30 minutes after washing their face before applying.  If too drying, patient may add a non-comedogenic moisturizer. The patient verbalized understanding of the proper use and possible adverse effects of retinoids.  All of the patient's questions and concerns were addressed.
Minocycline Counseling: Patient advised regarding possible photosensitivity and discoloration of the teeth, skin, lips, tongue and gums.  Patient instructed to avoid sunlight, if possible.  When exposed to sunlight, patients should wear protective clothing, sunglasses, and sunscreen.  The patient was instructed to call the office immediately if the following severe adverse effects occur:  hearing changes, easy bruising/bleeding, severe headache, or vision changes.  The patient verbalized understanding of the proper use and possible adverse effects of minocycline.  All of the patient's questions and concerns were addressed.

## 2019-10-10 NOTE — PROCEDURE: ADDITIONAL NOTES
Detail Level: Simple
Additional Notes: Pt has Tretinoin cream at home. CSJ recommend patient uses the cream every other night for the first week and then move up to every night for acne. Pt experiences dryness when using it, CSJ encouraged pt to mix Tretinoin and a face moisturizer to help with the dryness.

## 2019-10-23 ENCOUNTER — IMMUNIZATION (OUTPATIENT)
Dept: NURSING | Facility: CLINIC | Age: 39
End: 2019-10-23
Payer: COMMERCIAL

## 2019-10-23 DIAGNOSIS — Z23 NEED FOR PROPHYLACTIC VACCINATION AND INOCULATION AGAINST INFLUENZA: Primary | ICD-10-CM

## 2019-10-23 PROCEDURE — 90471 IMMUNIZATION ADMIN: CPT

## 2019-10-23 PROCEDURE — 99207 ZZC NO CHARGE NURSE ONLY: CPT

## 2019-10-23 PROCEDURE — 90686 IIV4 VACC NO PRSV 0.5 ML IM: CPT

## 2019-12-03 DIAGNOSIS — Z87.898 H/O MOTION SICKNESS: ICD-10-CM

## 2019-12-03 NOTE — TELEPHONE ENCOUNTER
Requested Prescriptions   Pending Prescriptions Disp Refills     TRANSDERM-SCOP, 1.5 MG, 1 MG/3DAYS 72 hr patch [Pharmacy Med Name: TRANSDERM-SCOP (1.5 MG) 1 PT72]  Last Written Prescription Date:  12/24/18  Last Fill Quantity: 6,  # refills: 0   Last office visit: 02/06/19 with prescribing provider:  ZION Zepeda   Future Office Visit:      0     Sig: REMOVE OLD PATCH AND PLACE ONE PATCH ONTO THE SKIN EVERY 72 HOURS FOR 10 DAYS       There is no refill protocol information for this order

## 2019-12-04 NOTE — TELEPHONE ENCOUNTER
Left message on voice mail for patient to call clinic. [phone]  Gayathri Nicole RN

## 2019-12-04 NOTE — TELEPHONE ENCOUNTER
Spoke with patient and she is going on a cruise mid January and would like this refilled.  She has an appt on 12/13/19 with another provider to be seen if needed, but asking for refill?  Please advise.  Gayathri Nicole RN

## 2019-12-05 ENCOUNTER — RX ONLY (RX ONLY)
Age: 39
End: 2019-12-05

## 2019-12-05 NOTE — TELEPHONE ENCOUNTER
Left message on voice mail for patient to keep appt scheduled and medication will be filled at that time, patient is to call clinic if questions or concerns. [phone].  Gayathri Nicole RN

## 2019-12-07 ENCOUNTER — ANCILLARY PROCEDURE (OUTPATIENT)
Dept: GENERAL RADIOLOGY | Facility: CLINIC | Age: 39
End: 2019-12-07
Attending: PREVENTIVE MEDICINE
Payer: COMMERCIAL

## 2019-12-07 ENCOUNTER — OFFICE VISIT (OUTPATIENT)
Dept: URGENT CARE | Facility: URGENT CARE | Age: 39
End: 2019-12-07
Payer: COMMERCIAL

## 2019-12-07 VITALS
OXYGEN SATURATION: 100 % | TEMPERATURE: 97.8 F | HEART RATE: 110 BPM | SYSTOLIC BLOOD PRESSURE: 154 MMHG | DIASTOLIC BLOOD PRESSURE: 101 MMHG

## 2019-12-07 DIAGNOSIS — R10.84 ABDOMINAL PAIN, GENERALIZED: ICD-10-CM

## 2019-12-07 DIAGNOSIS — R10.84 ABDOMINAL PAIN, GENERALIZED: Primary | ICD-10-CM

## 2019-12-07 LAB
ALBUMIN UR-MCNC: NEGATIVE MG/DL
APPEARANCE UR: CLEAR
BACTERIA #/AREA URNS HPF: ABNORMAL /HPF
BASOPHILS # BLD AUTO: 0 10E9/L (ref 0–0.2)
BASOPHILS NFR BLD AUTO: 0.8 %
BILIRUB UR QL STRIP: NEGATIVE
COLOR UR AUTO: YELLOW
DIFFERENTIAL METHOD BLD: NORMAL
EOSINOPHIL # BLD AUTO: 0.1 10E9/L (ref 0–0.7)
EOSINOPHIL NFR BLD AUTO: 2.5 %
ERYTHROCYTE [DISTWIDTH] IN BLOOD BY AUTOMATED COUNT: 11.7 % (ref 10–15)
FLUAV+FLUBV AG SPEC QL: NEGATIVE
FLUAV+FLUBV AG SPEC QL: NEGATIVE
GLUCOSE UR STRIP-MCNC: NEGATIVE MG/DL
HCG UR QL: NEGATIVE
HCT VFR BLD AUTO: 37.9 % (ref 35–47)
HGB BLD-MCNC: 12.4 G/DL (ref 11.7–15.7)
HGB UR QL STRIP: ABNORMAL
KETONES UR STRIP-MCNC: NEGATIVE MG/DL
LEUKOCYTE ESTERASE UR QL STRIP: ABNORMAL
LYMPHOCYTES # BLD AUTO: 1.4 10E9/L (ref 0.8–5.3)
LYMPHOCYTES NFR BLD AUTO: 33.8 %
MCH RBC QN AUTO: 30.8 PG (ref 26.5–33)
MCHC RBC AUTO-ENTMCNC: 32.7 G/DL (ref 31.5–36.5)
MCV RBC AUTO: 94 FL (ref 78–100)
MONOCYTES # BLD AUTO: 0.4 10E9/L (ref 0–1.3)
MONOCYTES NFR BLD AUTO: 10.3 %
NEUTROPHILS # BLD AUTO: 2.1 10E9/L (ref 1.6–8.3)
NEUTROPHILS NFR BLD AUTO: 52.6 %
NITRATE UR QL: NEGATIVE
NON-SQ EPI CELLS #/AREA URNS LPF: ABNORMAL /LPF
PH UR STRIP: 7 PH (ref 5–7)
PLATELET # BLD AUTO: 218 10E9/L (ref 150–450)
RBC # BLD AUTO: 4.02 10E12/L (ref 3.8–5.2)
RBC #/AREA URNS AUTO: ABNORMAL /HPF
SOURCE: ABNORMAL
SP GR UR STRIP: 1.01 (ref 1–1.03)
SPECIMEN SOURCE: NORMAL
UROBILINOGEN UR STRIP-ACNC: 0.2 EU/DL (ref 0.2–1)
WBC # BLD AUTO: 4 10E9/L (ref 4–11)
WBC #/AREA URNS AUTO: ABNORMAL /HPF

## 2019-12-07 PROCEDURE — 86140 C-REACTIVE PROTEIN: CPT | Performed by: PREVENTIVE MEDICINE

## 2019-12-07 PROCEDURE — 81001 URINALYSIS AUTO W/SCOPE: CPT | Performed by: PREVENTIVE MEDICINE

## 2019-12-07 PROCEDURE — 83690 ASSAY OF LIPASE: CPT | Performed by: PREVENTIVE MEDICINE

## 2019-12-07 PROCEDURE — 36415 COLL VENOUS BLD VENIPUNCTURE: CPT | Performed by: PREVENTIVE MEDICINE

## 2019-12-07 PROCEDURE — 99214 OFFICE O/P EST MOD 30 MIN: CPT | Performed by: PREVENTIVE MEDICINE

## 2019-12-07 PROCEDURE — 87804 INFLUENZA ASSAY W/OPTIC: CPT | Performed by: PREVENTIVE MEDICINE

## 2019-12-07 PROCEDURE — 80053 COMPREHEN METABOLIC PANEL: CPT | Performed by: PREVENTIVE MEDICINE

## 2019-12-07 PROCEDURE — 85025 COMPLETE CBC W/AUTO DIFF WBC: CPT | Performed by: PREVENTIVE MEDICINE

## 2019-12-07 PROCEDURE — 81025 URINE PREGNANCY TEST: CPT | Performed by: PREVENTIVE MEDICINE

## 2019-12-07 PROCEDURE — 74019 RADEX ABDOMEN 2 VIEWS: CPT

## 2019-12-07 NOTE — PATIENT INSTRUCTIONS
(R52) Body aches  (primary encounter diagnosis)  Influenza negative     (N92.6) Irregular periods  Plan: HCG Qual, Urine ([id number])  U preg negative     (R10.84) Abdominal pain, generalized  C/w gastroenteritis, advise bland diet, push fluids, rest, tylenol for achiness  CBC/UA wnl  Plan: CBC with platelets and differential,         Comprehensive metabolic panel, Lipase, CRP,         inflammation, UA reflex to Microscopic, XR         Abdomen 2 Views

## 2019-12-07 NOTE — PROGRESS NOTES
SUBJECTIVE:  Vikki Merlos, a 39 year old female scheduled an appointment to discuss the following issues:     Body aches  Irregular periods  TSH (thyroid-stimulating hormone deficiency)  Abdominal pain, generalized  Pt with n/v/d over past week, slightly improving  But now she feels weak.  No sick contacts, no recent abx or travel.  No raw or new foods.  No f/c, cough.  Some achiness - improving.  Some abdominal cramping, improving.  No blood in stool or urine.  No pain with urination.  Able to tolerate po.    Medical, social, surgical, and family histories reviewed.    ROS:  CONSTITUTIONAL: NEGATIVE for fever, chills  EYES: NEGATIVE for vision changes   RESP: NEGATIVE for significant cough or SOB  CV: NEGATIVE for chest pain, palpitations   GI: NEGATIVE for nausea, abdominal pain, heartburn, or change in bowel habits  : NEGATIVE for frequency, dysuria, or hematuria  MUSCULOSKELETAL: NEGATIVE for significant arthralgias or myalgia  NEURO: NEGATIVE for weakness, dizziness or paresthesias or headache    OBJECTIVE:  BP (!) 154/101   Pulse 110   Temp 97.8  F (36.6  C) (Tympanic)   LMP 11/15/2019 (Approximate)   SpO2 100%   EXAM:  GENERAL APPEARANCE: healthy, alert and no distress  EYES: EOMI,  PERRL  HENT: ear canals and TM's normal and nose and mouth without ulcers or lesions  RESP: lungs clear to auscultation - no rales, rhonchi or wheezes  CV: regular rates and rhythm, normal S1 S2, no S3 or S4 and no murmur, click or rub -  ABDOMEN:  soft, nontender, no HSM or masses and bowel sounds normal, no guarding, no rebound, neg carnetts sign    AXR - normal    ASSESSMENT/PLAN:  (R52) Body aches  (primary encounter diagnosis)  Influenza negative    (N92.6) Irregular periods  Plan: HCG Qual, Urine ([id number])  U preg negative    (R10.84) Abdominal pain, generalized  C/w gastroenteritis, advise bland diet, push fluids, rest, tylenol for achiness  CBC/UA wnl  Plan: CBC with platelets and differential,          Comprehensive metabolic panel, Lipase, CRP,         inflammation, UA reflex to Microscopic, XR         Abdomen 2 Views    Follow up if not improving          All risks, benefits of treatment and further evaluation was reviewed with patient.  Pt expressed understanding.  Pt was in agreement with this plan.  Christo Duckworth MD

## 2019-12-09 LAB
ALBUMIN SERPL-MCNC: 4.2 G/DL (ref 3.4–5)
ALP SERPL-CCNC: 69 U/L (ref 40–150)
ALT SERPL W P-5'-P-CCNC: 23 U/L (ref 0–50)
ANION GAP SERPL CALCULATED.3IONS-SCNC: 5 MMOL/L (ref 3–14)
AST SERPL W P-5'-P-CCNC: 16 U/L (ref 0–45)
BILIRUB SERPL-MCNC: 0.3 MG/DL (ref 0.2–1.3)
BUN SERPL-MCNC: 12 MG/DL (ref 7–30)
CALCIUM SERPL-MCNC: 8.9 MG/DL (ref 8.5–10.1)
CHLORIDE SERPL-SCNC: 108 MMOL/L (ref 94–109)
CO2 SERPL-SCNC: 28 MMOL/L (ref 20–32)
CREAT SERPL-MCNC: 0.8 MG/DL (ref 0.52–1.04)
CRP SERPL-MCNC: 3 MG/L (ref 0–8)
GFR SERPL CREATININE-BSD FRML MDRD: >90 ML/MIN/{1.73_M2}
GLUCOSE SERPL-MCNC: 94 MG/DL (ref 70–99)
LIPASE SERPL-CCNC: 134 U/L (ref 73–393)
POTASSIUM SERPL-SCNC: 4 MMOL/L (ref 3.4–5.3)
PROT SERPL-MCNC: 7.2 G/DL (ref 6.8–8.8)
SODIUM SERPL-SCNC: 141 MMOL/L (ref 133–144)

## 2019-12-13 ENCOUNTER — OFFICE VISIT (OUTPATIENT)
Dept: FAMILY MEDICINE | Facility: CLINIC | Age: 39
End: 2019-12-13
Payer: COMMERCIAL

## 2019-12-13 VITALS
OXYGEN SATURATION: 100 % | HEART RATE: 74 BPM | SYSTOLIC BLOOD PRESSURE: 126 MMHG | DIASTOLIC BLOOD PRESSURE: 85 MMHG | WEIGHT: 176 LBS | BODY MASS INDEX: 26.67 KG/M2 | TEMPERATURE: 98.3 F | RESPIRATION RATE: 16 BRPM | HEIGHT: 68 IN

## 2019-12-13 DIAGNOSIS — Z87.898 H/O MOTION SICKNESS: ICD-10-CM

## 2019-12-13 DIAGNOSIS — B35.1 ONYCHOMYCOSIS OF RIGHT GREAT TOE: Primary | ICD-10-CM

## 2019-12-13 PROCEDURE — 99213 OFFICE O/P EST LOW 20 MIN: CPT | Performed by: FAMILY MEDICINE

## 2019-12-13 RX ORDER — SCOLOPAMINE TRANSDERMAL SYSTEM 1 MG/1
1 PATCH, EXTENDED RELEASE TRANSDERMAL
Qty: 10 PATCH | Refills: 1 | Status: SHIPPED | OUTPATIENT
Start: 2019-12-13 | End: 2020-10-21

## 2019-12-13 RX ORDER — TERBINAFINE HYDROCHLORIDE 250 MG/1
250 TABLET ORAL DAILY
Qty: 90 TABLET | Refills: 0 | Status: SHIPPED | OUTPATIENT
Start: 2019-12-13 | End: 2020-03-12

## 2019-12-13 ASSESSMENT — MIFFLIN-ST. JEOR: SCORE: 1516.83

## 2019-12-13 NOTE — PATIENT INSTRUCTIONS
Patient Education     Nail Fungal Infection  A nail fungal infection changes the way fingernails and toenails look. They may thicken, discolor, change shape, or split. This condition is hard to treat because nails grow slowly and have limited blood supply. The infection often comes back after treatment.  There are 2 types of medicines used to treat this condition:    Topical anti-fungal medicines. These are applied to the surface of the skin and nail area. These medicines are not very effective because they can t get deep into the nail.    Oral antifungal medicines. These medicines work better because they go into the nail from the inside out. But the infection may still come back. It may take 9 to 12 months for your nail to look normal again. This means you are cured. You can repeat treatment if needed. Most people take these medicines without any problems. It is rare to stop therapy because of side effects. But your healthcare provider may give you some monitoring tests. Talk about possible side effects with your provider before starting treatment.  If medicines fail, the nail can be removed surgically or chemically. These methods physically remove the fungus from the body. This helps medical treatment be more effective.  Home care    Use medicines exactly as directed for as long as directed. Treating a fungal infection can take longer than other kinds of infections.    Smoking is a risk factor for fungal infection. This is one more reason to quit.    Wear absorbent socks, and shoes that let your feet breathe. Sweaty feet increase your risk of fungal infection. They also make an existing infection harder to treat.    Use footwear when in damp public places like swimming pools, gyms, and shower rooms. This will help you avoid the fungus that grows there.    Don't share nail clippers or scissors with others.  Follow-up care  Follow up with your healthcare provider, or as advised.  When to seek medical advice  Call  your healthcare provider right away if any of these occur:    Skin by the nail becomes red, swollen, painful, or drains pus (a creamy yellow or white liquid)    Side effects from oral anti-fungal medicines  Date Last Reviewed: 8/1/2016 2000-2018 [employer]. [address] [place] [address]. All rights reserved. This information is not intended as a substitute for professional medical care. Always follow your healthcare professional's instructions.

## 2019-12-13 NOTE — PROGRESS NOTES
Subjective     Vikki Merlos is a 39 year old female who presents to clinic today for the following health issues:    HPI     Infection of  Right bigToe x 5-6 months  Possibly caused from after receiving pedicure.   Nail has started to break off and turn black.  Denies having any pain.  Therapies tried : OTC tea tree oil- no relief       Medication Request  Reports a history of motion sickness  Requesting for patch for it- has used it in the past without any intolerable side effects  Will be going on cruise in January to the [place] with her  and possibly a [place] cruise with her children in February.      Patient Active Problem List   Diagnosis     AMA (advanced maternal age) primigravida 35+     Urinary tract infection     Past Surgical History:   Procedure Laterality Date     CONIZATION LEEP       DILATION AND CURETTAGE, HYSTEROSCOPY DIAGNOSTIC, COMBINED       DILATION AND CURETTAGE, HYSTEROSCOPY, ABLATE ENDOMETRIUM NOVASURE, COMBINED N/A 3/9/2017    Procedure: COMBINED DILATION AND CURETTAGE, HYSTEROSCOPY, ABLATE ENDOMETRIUM NOVASURE;  Surgeon: Caitlin House MD;  Location: [facility]     HEAD & NECK SURGERY      WISDOM TEETH EXTRACTED     LAPAROSCOPIC CYSTECTOMY OVARIAN (BENIGN) Right     1st trimester     LAPAROSCOPY DIAGNOSTIC (GYN)      WITH RIGHT SALPINGECTOMY, LAPAROSCOPY WITH JEM,        Social History     Tobacco Use     Smoking status: Never Smoker     Smokeless tobacco: Never Used   Substance Use Topics     Alcohol use: No     No family history on file.      Current Outpatient Medications   Medication Sig Dispense Refill     Naproxen Sodium (ALEVE PO) Take 220 mg by mouth daily as needed for moderate pain        scopolamine (TRANSDERM) 1 MG/3DAYS 72 hr patch Place 1 patch onto the skin every 72 hours (apply 1 patch every 3 days prn; Start: >4h before event; Info: do not cut patch) 10 patch 1     SUMAtriptan Succinate (IMITREX PO) Take 100 mg by mouth once as needed for migraine (MAY REPEAT  "IN 2 HOURS)       terbinafine (LAMISIL) 250 MG tablet Take 1 tablet (250 mg) by mouth daily 90 tablet 0     TRAMADOL HCL PO Take  mg by mouth every 6 hours as needed for moderate to severe pain        No Known Allergies      Reviewed and updated as needed this visit by Provider         Review of Systems   ROS COMP: Constitutional, HEENT, cardiovascular, pulmonary, gi and gu systems are negative, except as otherwise noted.      Objective    /85   Pulse 74   Temp 98.3  F (36.8  C) (Tympanic)   Resp 16   Ht 1.727 m (5' 8\")   Wt 79.8 kg (176 lb)   LMP 11/20/2019 (Exact Date)   SpO2 100%   Breastfeeding No   BMI 26.76 kg/m    Body mass index is 26.76 kg/m .  Physical Exam   GENERAL: healthy, alert and no distress  Foot exam: No cyanosis. Pedal pulses present.  Onychomycosis of the right great toe.       Diagnostic Test Results:  Labs reviewed in Epic  Component      Latest Ref Rng & Units 12/7/2019   Sodium      133 - 144 mmol/L 141   Potassium      3.4 - 5.3 mmol/L 4.0   Chloride      94 - 109 mmol/L 108   Carbon Dioxide      20 - 32 mmol/L 28   Anion Gap      3 - 14 mmol/L 5   Glucose      70 - 99 mg/dL 94   Urea Nitrogen      7 - 30 mg/dL 12   Creatinine      0.52 - 1.04 mg/dL 0.80   GFR Estimate      >60 mL/min/1.73:m2 >90   GFR Estimate If Black      >60 mL/min/1.73:m2 >90   Calcium      8.5 - 10.1 mg/dL 8.9   Bilirubin Total      0.2 - 1.3 mg/dL 0.3   Albumin      3.4 - 5.0 g/dL 4.2   Protein Total      6.8 - 8.8 g/dL 7.2   Alkaline Phosphatase      40 - 150 U/L 69   ALT      0 - 50 U/L 23   AST      0 - 45 U/L 16           Assessment & Plan     Vikki was seen today for toe pain and medication request.    Diagnoses and all orders for this visit:    Onychomycosis of right great toe  -     terbinafine (LAMISIL) 250 MG tablet; Take 1 tablet (250 mg) by mouth daily  -     Hepatic panel (Albumin, ALT, AST, Bili, Alk Phos, TP); Future in 3 months after completing therapy.    H/O motion " sickness  -     scopolamine (TRANSDERM) 1 MG/3DAYS 72 hr patch; Place 1 patch onto the skin every 72 hours (apply 1 patch every 3 days prn; Start: >4h before event; Info: do not cut patch)        Return in about 3 months (around 3/13/2020) for Lab Work.    Yamile Coronel MD  [facility]

## 2020-07-10 ENCOUNTER — HOSPITAL PATHOLOGY (OUTPATIENT)
Dept: OTHER | Facility: CLINIC | Age: 40
End: 2020-07-10

## 2020-07-14 LAB — COPATH REPORT: NORMAL

## 2020-09-22 ENCOUNTER — ALLIED HEALTH/NURSE VISIT (OUTPATIENT)
Dept: NURSING | Facility: CLINIC | Age: 40
End: 2020-09-22
Payer: COMMERCIAL

## 2020-09-22 DIAGNOSIS — Z23 NEED FOR PROPHYLACTIC VACCINATION AND INOCULATION AGAINST INFLUENZA: Primary | ICD-10-CM

## 2020-09-22 PROCEDURE — 90686 IIV4 VACC NO PRSV 0.5 ML IM: CPT

## 2020-09-22 PROCEDURE — 90471 IMMUNIZATION ADMIN: CPT

## 2020-09-22 NOTE — PROGRESS NOTES
Patient consents to receive outdoor care: Yes    Upon arrival, patient instructed to proceed to designated location, place vehicle in park, turn off, and remove keys     If we are unable to safely and ergonomically able to provide care- is the patient able to safely able to get out of car and transfer to a chair? Yes        Patient would like to receive their AVS via [facility] JOHNSON CMA  .

## 2020-10-05 ENCOUNTER — ANCILLARY PROCEDURE (OUTPATIENT)
Dept: MAMMOGRAPHY | Facility: CLINIC | Age: 40
End: 2020-10-05
Attending: OBSTETRICS & GYNECOLOGY
Payer: COMMERCIAL

## 2020-10-05 DIAGNOSIS — Z12.31 VISIT FOR SCREENING MAMMOGRAM: ICD-10-CM

## 2020-10-05 PROCEDURE — 77067 SCR MAMMO BI INCL CAD: CPT | Performed by: RADIOLOGY

## 2020-10-05 PROCEDURE — 77063 BREAST TOMOSYNTHESIS BI: CPT | Performed by: RADIOLOGY

## 2020-10-13 ENCOUNTER — APPOINTMENT (OUTPATIENT)
Dept: URBAN - METROPOLITAN AREA CLINIC 252 | Age: 40
Setting detail: DERMATOLOGY
End: 2020-10-20

## 2020-10-13 VITALS — RESPIRATION RATE: 16 BRPM | WEIGHT: 174 LBS | HEIGHT: 68 IN

## 2020-10-13 DIAGNOSIS — D18.0 HEMANGIOMA: ICD-10-CM

## 2020-10-13 DIAGNOSIS — Z41.9 ENCOUNTER FOR PROCEDURE FOR PURPOSES OTHER THAN REMEDYING HEALTH STATE, UNSPECIFIED: ICD-10-CM

## 2020-10-13 PROBLEM — D18.01 HEMANGIOMA OF SKIN AND SUBCUTANEOUS TISSUE: Status: ACTIVE | Noted: 2020-10-13

## 2020-10-13 PROCEDURE — OTHER COUNSELING: OTHER

## 2020-10-13 PROCEDURE — OTHER ELECTRODESICCATION (COSMETIC): OTHER

## 2020-10-13 PROCEDURE — OTHER BOTOX (U OR CC): OTHER

## 2020-10-13 ASSESSMENT — LOCATION ZONE DERM
LOCATION ZONE: NECK
LOCATION ZONE: TRUNK

## 2020-10-13 ASSESSMENT — LOCATION DETAILED DESCRIPTION DERM
LOCATION DETAILED: LEFT CLAVICULAR NECK
LOCATION DETAILED: LEFT LATERAL TRAPEZIAL NECK
LOCATION DETAILED: LEFT RIB CAGE
LOCATION DETAILED: RIGHT CLAVICULAR NECK
LOCATION DETAILED: RIGHT LATERAL ABDOMEN

## 2020-10-13 ASSESSMENT — LOCATION SIMPLE DESCRIPTION DERM
LOCATION SIMPLE: POSTERIOR NECK
LOCATION SIMPLE: LEFT ANTERIOR NECK
LOCATION SIMPLE: RIGHT ANTERIOR NECK
LOCATION SIMPLE: ABDOMEN

## 2020-10-13 NOTE — PROCEDURE: ELECTRODESICCATION (COSMETIC)
Detail Level: Detailed
Price (Use Numbers Only, No Special Characters Or $): 100
Consent: - Verbal and written consent was obtained, and risks were reviewed prior to procedure today. \\n- Risks discussed include but are not limited to pain, crusting, scabbing, scarring, temporary or permanent darker or lighter pigmentary change, recurrence, incomplete resolution, and infection.
[place]: 2
Post-Care Instructions: - Patient was instructed to avoid picking at any of the treated lesions.\\n- Patient may apply Vaseline ointment to crusted or scabbing areas.

## 2020-10-13 NOTE — PROCEDURE: BOTOX (U OR CC)
Post-Care Instructions: - Skin was cleansed with 70% isopropyl alcohol prior to injection today.\\n- Patient was instructed to not lie down for touch area for 6 hrs.\\n\\nTOTAL BOTOX USED TODAY: 20 units\\n\\nCOLOR KEY FOR DIAGRAM BELOW:\\nRed = 1/2 unit of Botox per injection\\nOrange = 1 units of Botox per injection\\nYellow = 2 units of Botox per injection\\nGreen = 3 units of Botox per injection\\nBlue = 4 units of Botox per injection\\nBrown = 5 units of Botox per injection\\nBlack = 6 units of Botox per injection

## 2020-10-13 NOTE — PROCEDURE: COUNSELING
Patient Specific Counseling (Will Not Stick From Patient To Patient): - Patient requested treatment today. \\n- Advised that treatment is available but not medically necessary.\\n- Recommended treatment with electrodessication and discussed cost of treatment.\\n- Advised it is not uncommon for these to grow back or for red color to persist after healing.  \\n- Also, it is very likely that more of these will develop in the future. \\n- Patient expressed understanding.\\n\\nCost of treatment today: $100
Detail Level: Zone

## 2020-10-13 NOTE — PROCEDURE: BOTOX (U OR CC)
Price (Use Numbers Only, No Special Characters Or $): 758 Price (Use Numbers Only, No Special Characters Or $): 558

## 2020-10-21 ENCOUNTER — OFFICE VISIT (OUTPATIENT)
Dept: FAMILY MEDICINE | Facility: CLINIC | Age: 40
End: 2020-10-21
Payer: COMMERCIAL

## 2020-10-21 VITALS
TEMPERATURE: 98.8 F | WEIGHT: 177 LBS | BODY MASS INDEX: 26.83 KG/M2 | DIASTOLIC BLOOD PRESSURE: 78 MMHG | SYSTOLIC BLOOD PRESSURE: 110 MMHG | HEART RATE: 70 BPM | HEIGHT: 68 IN

## 2020-10-21 DIAGNOSIS — B35.1 ONYCHOMYCOSIS OF RIGHT GREAT TOE: Primary | ICD-10-CM

## 2020-10-21 PROCEDURE — 99213 OFFICE O/P EST LOW 20 MIN: CPT | Performed by: PHYSICIAN ASSISTANT

## 2020-10-21 ASSESSMENT — MIFFLIN-ST. JEOR: SCORE: 1516.37

## 2020-10-21 ASSESSMENT — PAIN SCALES - GENERAL: PAINLEVEL: NO PAIN (0)

## 2020-10-21 NOTE — PROGRESS NOTES
"Subjective     Vikki Merlos is a 40 year old female who presents to clinic today for the following health issues:    HPI         Chief Complaint   Patient presents with     RECHECK     ongoing toe fungus on right great toe     -She was seen last December for some toe fungus she was having. She was prescribed a medication to help but it didn't seem to help. She is wanting to see what else she can do.        Review of Systems   Constitutional, HEENT, cardiovascular, pulmonary, gi and gu systems are negative, except as otherwise noted.      Objective    /78   Pulse 70   Temp 98.8  F (37.1  C) (Tympanic)   Ht 1.727 m (5' 8\")   Wt 80.3 kg (177 lb)   BMI 26.91 kg/m    Body mass index is 26.91 kg/m .  Physical Exam   GENERAL: healthy, alert and no distress  Foot:  Right great toe with thickening of nail and discoloration. Part of nail bed is exposed and approximately 50% of nail is discolored.    There is some mild thickening noted of other toenails.             Assessment & Plan     Onychomycosis of right great toe  onychomycosis     We had a discussion about this diagnosis and went over the various aspects to consider. Any treatment is optional.   Terbinafine (Lamisil) has already been tried (x 3 months) and was ineffective.  Will switch to itraconazole.  Reviewed potential side effects, including liver toxicity and this medication does carry more interaction potential, however she is not on any other chronic meds.      Will check LFTs in 1 month.    Treatment for 12 weeks.     If no improvement, will have her f/u with podiatry, may need nail removal (she will call if she needs this referral).     I have advised patient to keep area clean and dry, avoid tight or ill-fitting footwear, wear absorbent cotton socks, and wash clothing/towels in hot water.      Patient advised to follow-up in clinic if symptoms worsen or fail to improve as anticipated.         - itraconazole (ONMEL) 200 MG TABS tablet; Take 1 " "tablet (200 mg) by mouth daily  - Hepatic panel; Future     BMI:   Estimated body mass index is 26.91 kg/m  as calculated from the following:    Height as of this encounter: 1.727 m (5' 8\").    Weight as of this encounter: 80.3 kg (177 lb).          She is s/p hysterectomy (updated HM, no longer needing paps).     Return in about 4 weeks (around 11/18/2020) for lab only visit to recheck liver tests.    MILA Suazo [facility] GINGER    "

## 2020-10-21 NOTE — PATIENT INSTRUCTIONS
If no improvement with itraconazole, next step is referral to podiatry.       Patient Education     Nail Fungal Infection  A nail fungal infection changes the way fingernails and toenails look. They may thicken, discolor, change shape, or split. This condition is hard to treat because nails grow slowly and have limited blood supply. The infection often comes back after treatment.  There are 2 types of medicines used to treat this condition:    Topical anti-fungal medicines. These are applied to the surface of the skin and nail area. These medicines are not very effective because they can t get deep into the nail.    Oral antifungal medicines. These medicines work better because they go into the nail from the inside out. But the infection may still come back. It may take 9 to 12 months for your nail to look normal again. This means you are cured. You can repeat treatment if needed. Most people take these medicines without any problems. It is rare to stop therapy because of side effects. But your healthcare provider may give you some monitoring tests. Talk about possible side effects with your provider before starting treatment.  If medicines fail, the nail can be removed surgically or chemically. These methods physically remove the fungus from the body. This helps medical treatment be more effective.  Home care    Use medicines exactly as directed for as long as directed. Treating a fungal infection can take longer than other kinds of infections.    Smoking is a risk factor for fungal infection. This is one more reason to quit.    Wear absorbent socks, and shoes that let your feet breathe. Sweaty feet increase your risk of fungal infection. They also make an existing infection harder to treat.    Use footwear when in damp public places like swimming pools, gyms, and shower rooms. This will help you avoid the fungus that grows there.    Don't share nail clippers or scissors with others.  Follow-up care  Follow up with  your healthcare provider, or as advised.  When to seek medical advice  Call your healthcare provider right away if any of these occur:    Skin by the nail becomes red, swollen, painful, or drains pus (a creamy yellow or white liquid)    Side effects from oral anti-fungal medicines  Date Last Reviewed: 8/1/2016 2000-2019 [employer]. [address] [place] [address]. All rights reserved. This information is not intended as a substitute for professional medical care. Always follow your healthcare professional's instructions.

## 2020-10-28 ENCOUNTER — APPOINTMENT (OUTPATIENT)
Dept: URBAN - METROPOLITAN AREA CLINIC 252 | Age: 40
Setting detail: DERMATOLOGY
End: 2020-11-04

## 2020-10-28 DIAGNOSIS — Z41.9 ENCOUNTER FOR PROCEDURE FOR PURPOSES OTHER THAN REMEDYING HEALTH STATE, UNSPECIFIED: ICD-10-CM

## 2020-10-28 PROCEDURE — OTHER DIAGNOSIS COMMENT: OTHER

## 2020-10-28 NOTE — PROCEDURE: DIAGNOSIS COMMENT
Comment: Strong glabella muscles, touch up\\nUsed pathways DOA and chin trial\\nReturn to clinic for voluma of the cheeks\\nJuvaderm ultra plus marionette, chin,
Detail Level: Simple

## 2020-11-24 DIAGNOSIS — B35.1 ONYCHOMYCOSIS OF RIGHT GREAT TOE: ICD-10-CM

## 2020-11-24 LAB
ALBUMIN SERPL-MCNC: 4.2 G/DL (ref 3.4–5)
ALP SERPL-CCNC: 76 U/L (ref 40–150)
ALT SERPL W P-5'-P-CCNC: 22 U/L (ref 0–50)
AST SERPL W P-5'-P-CCNC: 17 U/L (ref 0–45)
BILIRUB DIRECT SERPL-MCNC: <0.1 MG/DL (ref 0–0.2)
BILIRUB SERPL-MCNC: 0.4 MG/DL (ref 0.2–1.3)
PROT SERPL-MCNC: 7.9 G/DL (ref 6.8–8.8)

## 2020-11-24 PROCEDURE — 80076 HEPATIC FUNCTION PANEL: CPT | Performed by: PHYSICIAN ASSISTANT

## 2020-11-24 PROCEDURE — 36415 COLL VENOUS BLD VENIPUNCTURE: CPT | Performed by: PHYSICIAN ASSISTANT

## 2020-11-25 ENCOUNTER — TELEPHONE (OUTPATIENT)
Dept: FAMILY MEDICINE | Facility: CLINIC | Age: 40
End: 2020-11-25

## 2020-11-25 DIAGNOSIS — B35.1 ONYCHOMYCOSIS OF RIGHT GREAT TOE: ICD-10-CM

## 2020-12-01 NOTE — TELEPHONE ENCOUNTER
"Please start a prior authorization (not sure who I send this to now that Jakob isn't doing this)    Info from note on 10/21/20  \"onychomycosis      We had a discussion about this diagnosis and went over the various aspects to consider. Any treatment is optional.   Terbinafine (Lamisil) has already been tried (x 3 months) and was ineffective.  Will switch to itraconazole.  Reviewed potential side effects, including liver toxicity and this medication does carry more interaction potential, however she is not on any other chronic meds.  \"  "

## 2020-12-06 ENCOUNTER — HEALTH MAINTENANCE LETTER (OUTPATIENT)
Age: 40
End: 2020-12-06

## 2020-12-15 NOTE — TELEPHONE ENCOUNTER
Central Prior Authorization Team   Phone: [phone]    PA Initiation    Medication: Prior Authorization Required for Itraconazole  Insurance Company: [employer] - Phone [phone] Fax [phone]  Pharmacy Filling the Rx: [facility] - [id number] [facility]  Filling Pharmacy Phone: [phone]  Filling Pharmacy Fax: [phone]  Start Date: 12/15/2020

## 2020-12-15 NOTE — TELEPHONE ENCOUNTER
Spoke with insurance rep, she stated a PA is not an option on this patient's plan.  I tried to do an administrative review and that is not available either.  Insurance rep advised that patient will need to call her insurance company to find out if there is anything that can be done.  Patient will need to call the number on the back of her insurance card.

## 2021-02-08 ENCOUNTER — TELEPHONE (OUTPATIENT)
Dept: FAMILY MEDICINE | Facility: CLINIC | Age: 41
End: 2021-02-08

## 2021-02-08 DIAGNOSIS — B35.1 ONYCHOMYCOSIS OF RIGHT GREAT TOE: Primary | ICD-10-CM

## 2021-02-08 NOTE — TELEPHONE ENCOUNTER
Patient called you told her to call back to get a referral if medication did not work. It is not working and would like a referral to a specialist.  Please call Vikki at [phone] to advise.  Latoya Pa,

## 2021-02-19 ENCOUNTER — OFFICE VISIT (OUTPATIENT)
Dept: PODIATRY | Facility: CLINIC | Age: 41
End: 2021-02-19
Payer: COMMERCIAL

## 2021-02-19 VITALS
SYSTOLIC BLOOD PRESSURE: 159 MMHG | HEIGHT: 68 IN | OXYGEN SATURATION: 99 % | WEIGHT: 181.8 LBS | DIASTOLIC BLOOD PRESSURE: 96 MMHG | HEART RATE: 98 BPM | BODY MASS INDEX: 27.55 KG/M2

## 2021-02-19 DIAGNOSIS — B35.1 ONYCHOMYCOSIS OF RIGHT GREAT TOE: ICD-10-CM

## 2021-02-19 LAB
ALBUMIN SERPL-MCNC: 4.3 G/DL (ref 3.4–5)
ALP SERPL-CCNC: 72 U/L (ref 40–150)
ALT SERPL W P-5'-P-CCNC: 23 U/L (ref 0–50)
AST SERPL W P-5'-P-CCNC: 16 U/L (ref 0–45)
BILIRUB DIRECT SERPL-MCNC: <0.1 MG/DL (ref 0–0.2)
BILIRUB SERPL-MCNC: 0.3 MG/DL (ref 0.2–1.3)
PROT SERPL-MCNC: 8 G/DL (ref 6.8–8.8)

## 2021-02-19 PROCEDURE — 99204 OFFICE O/P NEW MOD 45 MIN: CPT | Mod: 25 | Performed by: PODIATRIST

## 2021-02-19 PROCEDURE — 11720 DEBRIDE NAIL 1-5: CPT | Performed by: PODIATRIST

## 2021-02-19 PROCEDURE — 36415 COLL VENOUS BLD VENIPUNCTURE: CPT | Performed by: PODIATRIST

## 2021-02-19 PROCEDURE — 80076 HEPATIC FUNCTION PANEL: CPT | Performed by: PODIATRIST

## 2021-02-19 RX ORDER — TERBINAFINE HYDROCHLORIDE 250 MG/1
250 TABLET ORAL DAILY
Qty: 30 TABLET | Refills: 2 | Status: SHIPPED | OUTPATIENT
Start: 2021-02-19 | End: 2024-06-25

## 2021-02-19 ASSESSMENT — MIFFLIN-ST. JEOR: SCORE: 1538.14

## 2021-02-19 NOTE — LETTER
2/19/2021         RE: Vikki Merlos  [address] [facility] [address]        Dear Colleague,    Thank you for referring your patient, Vikki Merlos, to the [facility]. Please see a copy of my visit note below.    Patient seen today in consult from Bina Sheth and complains of thick right first toenails(s) .  Has had this for 1 years.  It is slowly getting worse.  Has had pain in the past which is aggravated by activity and relieved by rest.  Tried over the counter medications without success.  In the past patient was on Lamisil for 3 months.  It did not seem to change it and then she was switched over to Sporanox.  After taking this she stopped since it was causing migraines.  She is wondering if anything can be done to resolve this.  She states she takes a bath every night immersing her foot in water.    ROS:  A 10-point review of systems was performed and is positive for that noted in the HPI and as seen below.  All other areas are negative.        No Known Allergies    Current Outpatient Medications   Medication Sig Dispense Refill     terbinafine (LAMISIL) 250 MG tablet Take 1 tablet (250 mg) by mouth daily 30 tablet 2     itraconazole (ONMEL) 200 MG TABS tablet Take 1 tablet (200 mg) by mouth daily 30 tablet 2     Naproxen Sodium (ALEVE PO) Take 220 mg by mouth daily as needed for moderate pain        SUMAtriptan Succinate (IMITREX PO) Take 100 mg by mouth once as needed for migraine (MAY REPEAT IN 2 HOURS)       TRAMADOL HCL PO Take  mg by mouth every 6 hours as needed for moderate to severe pain          Patient Active Problem List   Diagnosis     AMA (advanced maternal age) primigravida 35+     Urinary tract infection       Past Medical History:   Diagnosis Date     Anemia      Anxiety      Cervical dysplasia      Constipation      Migraine      Pneumonia     current cold       Past Surgical History:   Procedure Laterality Date     CONIZATION LEEP        "DILATION AND CURETTAGE, HYSTEROSCOPY DIAGNOSTIC, COMBINED       DILATION AND CURETTAGE, HYSTEROSCOPY, ABLATE ENDOMETRIUM NOVASURE, COMBINED N/A 03/09/2017    Procedure: COMBINED DILATION AND CURETTAGE, HYSTEROSCOPY, ABLATE ENDOMETRIUM NOVASURE;  Surgeon: Caitlin House MD;  Location: [facility]     HEAD & NECK SURGERY      WISDOM TEETH EXTRACTED     HYSTERECTOMY, PAP NO LONGER INDICATED       LAPAROSCOPIC CYSTECTOMY OVARIAN (BENIGN) Right     1st trimester     LAPAROSCOPY DIAGNOSTIC (GYN)      WITH RIGHT SALPINGECTOMY, LAPAROSCOPY WITH JEM,        No family history on file.    Social History     Tobacco Use     Smoking status: Never Smoker     Smokeless tobacco: Never Used   Substance Use Topics     Alcohol use: No         BP (!) 159/96   Pulse 98   Ht 1.727 m (5' 8\")   Wt 82.5 kg (181 lb 12.8 oz)   SpO2 99%   BMI 27.64 kg/m  .      VConstitutional/ general:  Pt is in no apparent distress, appears well-nourished.  Cooperative with history and physical exam.     Psych:  The patient answered questions appropriately.  Normal affect.  Seems to have reasonable expectations, in terms of treatment.    Eyes:  Visual scanning/ tracking without deficit.    Ears:  Response to auditory stimuli is normal.  negative hearing aid devices.  Auricles in proper alignment.     Lymphatic:  Popliteal lymph nodes not enlarged.     Lungs:  Non labored breathing, non labored speech. No cough.  No audible wheezing. Even, quiet breathing.       Vascular:  Pedal pulses are palpable bilaterally for both the DP and PT arteries.  CFT < 3 sec.  No edema.  Pedal hair growth noted.     Neuro:  Alert and oriented x 3. Coordinated gait.  Light touch sensation is intact to the L4, L5, S1 distributions. No obvious deficits.  No evidence of neurological-based weakness, spasticity, or contracture in the lower extremities.    Derm: Normal texture and turgor.  No erythema, ecchymosis, or cyanosis.  No open lesions. right first toenails(s)  thickened, " elongated, discolored, with subungual debris.  We note that almost all the visible nail is loose.  With debriding this all back there is just a small portion of nail attached proximally.  No erythema, edema, drainage, pain on palpation.  No signs of subungual masses or exostosis.    Musculoskeletal:    Lower extremity muscle strength is normal.  Patient is ambulatory without an assistive device or brace.  No gross deformities.  MS 5/5 all compartments.  Normal arch with weightbearing.         A/P  Onychomycosis    Discussed all options with patient.  Explained that the patient immersing her toe and a tub every night for quite a while may make this moist especially since the nail is loose.  With a  we did read back all the loose nail.  She will keep this filed short.  She will no longer immerse this in a tub and keep it as dry as possible.  We discussed trying an oral antifungal again.  Would like to try lamisil since she tolerated this well in the past risks, complications, and efficacy of going on this pill were discussed with the patient.  Will start lamisil 250mg, dispense 30, one per oral every day.  Two refils.  Will get LFTs today for baseline.  RTC in 3 months.  Thank you for allowing me participate in the care of this patient.        Darnell Colon DPM DPM, FACFAS                    Again, thank you for allowing me to participate in the care of your patient.        Sincerely,        Darnell Colon DPM

## 2021-02-19 NOTE — PROGRESS NOTES
Patient seen today in consult from Bina Sheth and complains of thick right first toenails(s) .  Has had this for 1 years.  It is slowly getting worse.  Has had pain in the past which is aggravated by activity and relieved by rest.  Tried over the counter medications without success.  In the past patient was on Lamisil for 3 months.  It did not seem to change it and then she was switched over to Sporanox.  After taking this she stopped since it was causing migraines.  She is wondering if anything can be done to resolve this.  She states she takes a bath every night immersing her foot in water.    ROS:  A 10-point review of systems was performed and is positive for that noted in the HPI and as seen below.  All other areas are negative.        No Known Allergies    Current Outpatient Medications   Medication Sig Dispense Refill     terbinafine (LAMISIL) 250 MG tablet Take 1 tablet (250 mg) by mouth daily 30 tablet 2     itraconazole (ONMEL) 200 MG TABS tablet Take 1 tablet (200 mg) by mouth daily 30 tablet 2     Naproxen Sodium (ALEVE PO) Take 220 mg by mouth daily as needed for moderate pain        SUMAtriptan Succinate (IMITREX PO) Take 100 mg by mouth once as needed for migraine (MAY REPEAT IN 2 HOURS)       TRAMADOL HCL PO Take  mg by mouth every 6 hours as needed for moderate to severe pain          Patient Active Problem List   Diagnosis     AMA (advanced maternal age) primigravida 35+     Urinary tract infection       Past Medical History:   Diagnosis Date     Anemia      Anxiety      Cervical dysplasia      Constipation      Migraine      Pneumonia     current cold       Past Surgical History:   Procedure Laterality Date     CONIZATION LEEP       DILATION AND CURETTAGE, HYSTEROSCOPY DIAGNOSTIC, COMBINED       DILATION AND CURETTAGE, HYSTEROSCOPY, ABLATE ENDOMETRIUM NOVASURE, COMBINED N/A 03/09/2017    Procedure: COMBINED DILATION AND CURETTAGE, HYSTEROSCOPY, ABLATE ENDOMETRIUM NOVASURE;  Surgeon:  "Caitlin House MD;  Location: [facility]     HEAD & NECK SURGERY      WISDOM TEETH EXTRACTED     HYSTERECTOMY, PAP NO LONGER INDICATED       LAPAROSCOPIC CYSTECTOMY OVARIAN (BENIGN) Right     1st trimester     LAPAROSCOPY DIAGNOSTIC (GYN)      WITH RIGHT SALPINGECTOMY, LAPAROSCOPY WITH JEM,        No family history on file.    Social History     Tobacco Use     Smoking status: Never Smoker     Smokeless tobacco: Never Used   Substance Use Topics     Alcohol use: No         BP (!) 159/96   Pulse 98   Ht 1.727 m (5' 8\")   Wt 82.5 kg (181 lb 12.8 oz)   SpO2 99%   BMI 27.64 kg/m  .      VConstitutional/ general:  Pt is in no apparent distress, appears well-nourished.  Cooperative with history and physical exam.     Psych:  The patient answered questions appropriately.  Normal affect.  Seems to have reasonable expectations, in terms of treatment.    Eyes:  Visual scanning/ tracking without deficit.    Ears:  Response to auditory stimuli is normal.  negative hearing aid devices.  Auricles in proper alignment.     Lymphatic:  Popliteal lymph nodes not enlarged.     Lungs:  Non labored breathing, non labored speech. No cough.  No audible wheezing. Even, quiet breathing.       Vascular:  Pedal pulses are palpable bilaterally for both the DP and PT arteries.  CFT < 3 sec.  No edema.  Pedal hair growth noted.     Neuro:  Alert and oriented x 3. Coordinated gait.  Light touch sensation is intact to the L4, L5, S1 distributions. No obvious deficits.  No evidence of neurological-based weakness, spasticity, or contracture in the lower extremities.    Derm: Normal texture and turgor.  No erythema, ecchymosis, or cyanosis.  No open lesions. right first toenails(s)  thickened, elongated, discolored, with subungual debris.  We note that almost all the visible nail is loose.  With debriding this all back there is just a small portion of nail attached proximally.  No erythema, edema, drainage, pain on palpation.  No signs of " subungual masses or exostosis.    Musculoskeletal:    Lower extremity muscle strength is normal.  Patient is ambulatory without an assistive device or brace.  No gross deformities.  MS 5/5 all compartments.  Normal arch with weightbearing.         A/P  Onychomycosis    Discussed all options with patient.  Explained that the patient immersing her toe and a tub every night for quite a while may make this moist especially since the nail is loose.  With a  we did read back all the loose nail.  She will keep this filed short.  She will no longer immerse this in a tub and keep it as dry as possible.  We discussed trying an oral antifungal again.  Would like to try lamisil since she tolerated this well in the past risks, complications, and efficacy of going on this pill were discussed with the patient.  Will start lamisil 250mg, dispense 30, one per oral every day.  Two refils.  Will get LFTs today for baseline.  RTC in 3 months.  Thank you for allowing me participate in the care of this patient.        Darnell Colon, JUAN DPGIOVANNY, FACFAS

## 2021-02-19 NOTE — NURSING NOTE
Vikki to follow up with Primary Care provider regarding elevated blood pressure.      CHRISTA Escudero

## 2021-03-16 NOTE — PROCEDURE: BOTOX (U OR CC)
Dr. Singh Consent: - Verbal and written consent obtained. \\n- Discussed the risks that include but not limited to lid/brow ptosis, bruising, headache, asymmetry, swelling, diplopia, temporary effect, and incomplete chemical denervation.\\n- Advised that it can take up to 14 days for the full effect of the Botox to take place.\\n- Generally the effects of Botox are expected to last for 3-4 months, but length of duration can vary.

## 2021-05-24 ENCOUNTER — TELEPHONE (OUTPATIENT)
Dept: PODIATRY | Facility: CLINIC | Age: 41
End: 2021-05-24

## 2021-05-24 NOTE — TELEPHONE ENCOUNTER
needs 3 month Follow-up after starting medications.  Per Md's notes. patient informed   She will make Follow-up appt  Alem Garber CMA

## 2021-05-24 NOTE — TELEPHONE ENCOUNTER
Reason for Call:  Other prescription    Detailed comments: patient is calling to check on status for refill medication terbinafine  Thank you    Phone Number Patient can be reached at: Home number on file [phone] (home)    Best Time: any    Can we leave a detailed message on this number? YES    Call taken on 5/24/2021 at 9:29 AM by Darlene Goldberg

## 2021-09-25 ENCOUNTER — HEALTH MAINTENANCE LETTER (OUTPATIENT)
Age: 41
End: 2021-09-25

## 2021-11-29 ENCOUNTER — ANCILLARY PROCEDURE (OUTPATIENT)
Dept: MAMMOGRAPHY | Facility: CLINIC | Age: 41
End: 2021-11-29
Attending: FAMILY MEDICINE
Payer: COMMERCIAL

## 2021-11-29 DIAGNOSIS — Z12.31 VISIT FOR SCREENING MAMMOGRAM: ICD-10-CM

## 2021-11-29 PROCEDURE — 77067 SCR MAMMO BI INCL CAD: CPT | Mod: GC | Performed by: RADIOLOGY

## 2021-11-29 PROCEDURE — 77063 BREAST TOMOSYNTHESIS BI: CPT | Mod: GC | Performed by: RADIOLOGY

## 2021-11-30 ENCOUNTER — APPOINTMENT (OUTPATIENT)
Dept: URBAN - METROPOLITAN AREA CLINIC 252 | Age: 41
Setting detail: DERMATOLOGY
End: 2021-12-06

## 2021-11-30 VITALS — WEIGHT: 162 LBS | RESPIRATION RATE: 18 BRPM | HEIGHT: 68 IN

## 2021-11-30 DIAGNOSIS — D22 MELANOCYTIC NEVI: ICD-10-CM

## 2021-11-30 DIAGNOSIS — D18.0 HEMANGIOMA: ICD-10-CM

## 2021-11-30 DIAGNOSIS — D485 NEOPLASM OF UNCERTAIN BEHAVIOR OF SKIN: ICD-10-CM

## 2021-11-30 DIAGNOSIS — Z71.89 OTHER SPECIFIED COUNSELING: ICD-10-CM

## 2021-11-30 PROBLEM — D18.01 HEMANGIOMA OF SKIN AND SUBCUTANEOUS TISSUE: Status: ACTIVE | Noted: 2021-11-30

## 2021-11-30 PROBLEM — D22.5 MELANOCYTIC NEVI OF TRUNK: Status: ACTIVE | Noted: 2021-11-30

## 2021-11-30 PROBLEM — D48.5 NEOPLASM OF UNCERTAIN BEHAVIOR OF SKIN: Status: ACTIVE | Noted: 2021-11-30

## 2021-11-30 PROBLEM — D22.72 MELANOCYTIC NEVI OF LEFT LOWER LIMB, INCLUDING HIP: Status: ACTIVE | Noted: 2021-11-30

## 2021-11-30 PROCEDURE — OTHER BENIGN DESTRUCTION COSMETIC MULTI: OTHER

## 2021-11-30 PROCEDURE — OTHER BIOPSY BY SHAVE METHOD: OTHER

## 2021-11-30 PROCEDURE — 99212 OFFICE O/P EST SF 10 MIN: CPT

## 2021-11-30 PROCEDURE — OTHER COUNSELING: OTHER

## 2021-11-30 PROCEDURE — OTHER BENIGN DESTRUCTION COSMETIC: OTHER

## 2021-11-30 PROCEDURE — 11102 TANGNTL BX SKIN SINGLE LES: CPT

## 2021-11-30 ASSESSMENT — LOCATION DETAILED DESCRIPTION DERM
LOCATION DETAILED: RIGHT INFERIOR LATERAL MIDBACK
LOCATION DETAILED: LEFT LATERAL BREAST 5-6:00 REGION
LOCATION DETAILED: LEFT SUPERIOR MEDIAL MIDBACK
LOCATION DETAILED: LEFT INFERIOR LATERAL MIDBACK
LOCATION DETAILED: PERIUMBILICAL SKIN
LOCATION DETAILED: RIGHT LATERAL BREAST 7-8:00 REGION
LOCATION DETAILED: LEFT MEDIAL BREAST 6-7:00 REGION
LOCATION DETAILED: RIGHT AREOLA
LOCATION DETAILED: LEFT PROXIMAL POSTERIOR THIGH
LOCATION DETAILED: LEFT MEDIAL BREAST 8-9:00 REGION
LOCATION DETAILED: INFERIOR THORACIC SPINE
LOCATION DETAILED: RIGHT INFERIOR MEDIAL MIDBACK
LOCATION DETAILED: LEFT MEDIAL UPPER BACK
LOCATION DETAILED: LEFT INFERIOR LATERAL MIDBACK
LOCATION DETAILED: RIGHT LATERAL BREAST 8-9:00 REGION
LOCATION DETAILED: LEFT PROXIMAL POSTERIOR THIGH
LOCATION DETAILED: LEFT INFRAMAMMARY CREASE (OUTER QUADRANT)
LOCATION DETAILED: LEFT MEDIAL BREAST 7-8:00 REGION
LOCATION DETAILED: MIDDLE STERNUM
LOCATION DETAILED: RIGHT INFERIOR UPPER BACK
LOCATION DETAILED: RIGHT MEDIAL BREAST 5-6:00 REGION
LOCATION DETAILED: RIGHT SUPERIOR LATERAL MIDBACK
LOCATION DETAILED: RIGHT LATERAL ABDOMEN
LOCATION DETAILED: LEFT LATERAL ABDOMEN
LOCATION DETAILED: LOWER STERNUM

## 2021-11-30 ASSESSMENT — LOCATION SIMPLE DESCRIPTION DERM
LOCATION SIMPLE: LEFT LOWER BACK
LOCATION SIMPLE: RIGHT BREAST
LOCATION SIMPLE: LEFT UPPER BACK
LOCATION SIMPLE: RIGHT LOWER BACK
LOCATION SIMPLE: UPPER BACK
LOCATION SIMPLE: LEFT POSTERIOR THIGH
LOCATION SIMPLE: LEFT POSTERIOR THIGH
LOCATION SIMPLE: CHEST
LOCATION SIMPLE: LEFT LOWER BACK
LOCATION SIMPLE: LEFT BREAST
LOCATION SIMPLE: RIGHT LOWER BACK
LOCATION SIMPLE: ABDOMEN
LOCATION SIMPLE: RIGHT UPPER BACK

## 2021-11-30 ASSESSMENT — LOCATION ZONE DERM
LOCATION ZONE: LEG
LOCATION ZONE: TRUNK
LOCATION ZONE: LEG
LOCATION ZONE: TRUNK

## 2021-11-30 NOTE — PROCEDURE: BENIGN DESTRUCTION COSMETIC
Consent: The patient's consent was obtained including but not limited to risks of crusting, scabbing, blistering, scarring, darker or lighter pigmentary change, recurrence, incomplete removal and infection.
Post-Care Instructions: I reviewed with the patient in detail post-care instructions. Patient is to wear sunprotection, and avoid picking at any of the treated lesions. Pt may apply Vaseline to crusted or scabbing areas.
Price (Use Numbers Only, No Special Characters Or $): 200
Detail Level: Detailed
Anesthesia Volume In Cc: 0.5

## 2021-11-30 NOTE — PROCEDURE: BENIGN DESTRUCTION COSMETIC MULTI
Consent: The patient's consent was obtained including but not limited to risks of crusting, scabbing, blistering, scarring, darker or lighter pigmentary change, recurrence, incomplete removal and infection.
Post-Care Instructions: I reviewed with the patient in detail post-care instructions. Patient is to wear sunprotection, and avoid picking at any of the treated lesions. Pt may apply Vaseline to crusted or scabbing areas.
Price (Use Numbers Only, No Special Characters Or $): 150
Topical Anesthesia?: 20% benzocaine, 8% lidocaine, 4% tetracaine
Total Number Of Lesions Treated: 30
Detail Level: Detailed
Anesthesia Volume In Cc: 0

## 2021-11-30 NOTE — PROCEDURE: BIOPSY BY SHAVE METHOD
Anesthesia Volume In Cc (Will Not Render If 0): 0.5
Size Of Lesion In Cm: 0
Billing Type: Client Bill
Validate Triangulation: No
Depth Of Biopsy: dermis
Electrodesiccation And Curettage Text: The wound bed was treated with electrodesiccation and curettage after the biopsy was performed.
Information: Selecting Yes will display possible errors in your note based on the variables you have selected. This validation is only offered as a suggestion for you. PLEASE NOTE THAT THE VALIDATION TEXT WILL BE REMOVED WHEN YOU FINALIZE YOUR NOTE. IF YOU WANT TO FAX A PRELIMINARY NOTE YOU WILL NEED TO TOGGLE THIS TO 'NO' IF YOU DO NOT WANT IT IN YOUR FAXED NOTE.
Electrodesiccation Text: The wound bed was treated with electrodesiccation after the biopsy was performed.
Type Of Destruction Used: Curettage
Validate Note Data (See Information Below): Yes
Hemostasis: Drysol
Cryotherapy Text: The wound bed was treated with cryotherapy after the biopsy was performed.
Biopsy Type: H and E
Silver Nitrate Text: The wound bed was treated with silver nitrate after the biopsy was performed.
Curettage Text: The wound bed was treated with curettage after the biopsy was performed.
Post-Care Instructions: I reviewed with the patient in detail post-care instructions. Patient is to keep the biopsy site dry overnight, and then apply bacitracin twice daily until healed. Patient may apply hydrogen peroxide soaks to remove any crusting.
Wound Care: Petrolatum
Anesthesia Type: 1% lidocaine with epinephrine
Consent: Written consent was obtained and risks were reviewed including but not limited to scarring, infection, bleeding, scabbing, incomplete removal, nerve damage and allergy to anesthesia.
Detail Level: Detailed
Dressing: bandage
Biopsy Method: Dermablade
Notification Instructions: Patient will be notified of biopsy results. However, patient instructed to call the office if not contacted within 2 weeks.

## 2022-01-15 ENCOUNTER — HEALTH MAINTENANCE LETTER (OUTPATIENT)
Age: 42
End: 2022-01-15

## 2022-05-17 ENCOUNTER — APPOINTMENT (OUTPATIENT)
Dept: URBAN - METROPOLITAN AREA CLINIC 252 | Age: 42
Setting detail: DERMATOLOGY
End: 2022-05-17

## 2022-05-17 DIAGNOSIS — Z41.9 ENCOUNTER FOR PROCEDURE FOR PURPOSES OTHER THAN REMEDYING HEALTH STATE, UNSPECIFIED: ICD-10-CM

## 2022-05-17 PROCEDURE — OTHER BOTOX (U OR CC): OTHER

## 2022-05-17 NOTE — PROCEDURE: BOTOX (U OR CC)
Price (Use Numbers Only, No Special Characters Or $): 752 Price (Use Numbers Only, No Special Characters Or $): 959

## 2022-05-17 NOTE — PROCEDURE: BOTOX (U OR CC)
Post-Care Instructions: - Skin was cleansed with 70% isopropyl alcohol prior to injection today.\\n- Patient was instructed to not lie down for touch area for 6 hrs.\\n\\nTOTAL BOTOX USED TODAY: 19 units\\n\\nCOLOR KEY FOR DIAGRAM BELOW:\\nRed = 1/2 unit of Botox per injection\\nOrange = 1 units of Botox per injection\\nYellow = 2 units of Botox per injection\\nGreen = 3 units of Botox per injection\\nBlue = 4 units of Botox per injection\\nBrown = 5 units of Botox per injection\\nBlack = 6 units of Botox per injection

## 2022-05-17 NOTE — PROCEDURE: BOTOX (U OR CC)
Good, given speech therapy in an acute rehab. Consent: - Verbal and written consent obtained. \\n- Discussed the risks that include but not limited to lid/brow ptosis, bruising, headache, asymmetry, swelling, diplopia, temporary effect, and incomplete chemical denervation.\\n- Advised that it can take up to 14 days for the full effect of the Botox to take place.\\n- Generally the effects of Botox are expected to last for 3-4 months, but length of duration can vary.

## 2022-05-17 NOTE — HPI: COSMETIC (BOTOX)
Additional History: Denies side effects in past. Last Botox was november. Prefers to stick with Botox and not try Jeuveau. She prefers very conservative treatment.

## 2022-09-12 ENCOUNTER — APPOINTMENT (OUTPATIENT)
Dept: URBAN - METROPOLITAN AREA CLINIC 252 | Age: 42
Setting detail: DERMATOLOGY
End: 2022-09-13

## 2022-09-12 DIAGNOSIS — D22 MELANOCYTIC NEVI: ICD-10-CM

## 2022-09-12 DIAGNOSIS — Z41.9 ENCOUNTER FOR PROCEDURE FOR PURPOSES OTHER THAN REMEDYING HEALTH STATE, UNSPECIFIED: ICD-10-CM

## 2022-09-12 DIAGNOSIS — L72.0 EPIDERMAL CYST: ICD-10-CM

## 2022-09-12 PROBLEM — D22.39 MELANOCYTIC NEVI OF OTHER PARTS OF FACE: Status: ACTIVE | Noted: 2022-09-12

## 2022-09-12 PROCEDURE — OTHER BENIGN DESTRUCTION COSMETIC: OTHER

## 2022-09-12 PROCEDURE — OTHER BOTOX (U OR CC): OTHER

## 2022-09-12 PROCEDURE — OTHER COSMETIC SHAVE REMOVAL (NO PATHOLOGY): OTHER

## 2022-09-12 PROCEDURE — OTHER COUNSELING: OTHER

## 2022-09-12 ASSESSMENT — LOCATION DETAILED DESCRIPTION DERM
LOCATION DETAILED: RIGHT LATERAL CANTHUS
LOCATION DETAILED: RIGHT LATERAL INFERIOR EYELID
LOCATION DETAILED: LEFT CENTRAL MALAR CHEEK

## 2022-09-12 ASSESSMENT — LOCATION SIMPLE DESCRIPTION DERM
LOCATION SIMPLE: RIGHT INFERIOR EYELID
LOCATION SIMPLE: RIGHT EYELID
LOCATION SIMPLE: LEFT CHEEK

## 2022-09-12 ASSESSMENT — LOCATION ZONE DERM
LOCATION ZONE: EYELID
LOCATION ZONE: FACE

## 2022-09-12 NOTE — PROCEDURE: BOTOX (U OR CC)
Post-Care Instructions: - Skin was cleansed with 70% isopropyl alcohol prior to injection today.\\n- Patient was instructed to not lie down for touch area for 6 hrs.\\n\\nTOTAL BOTOX USED TODAY: 22 units\\n\\nCOLOR KEY FOR DIAGRAM BELOW:\\nRed = 1/2 unit of Botox per injection\\nOrange = 1 units of Botox per injection\\nYellow = 2 units of Botox per injection\\nGreen = 3 units of Botox per injection\\nBlue = 4 units of Botox per injection\\nBrown = 5 units of Botox per injection\\nBlack = 6 units of Botox per injection
Detail Level: Detailed
Lateral Platysmal Bands Units: 0
Document As Units Or Cc?: units
Price (Use Numbers Only, No Special Characters Or $): 308
Including Pricing Information In The Note: Yes
Consent: - Verbal and written consent obtained. \\n- Discussed the risks that include but not limited to lid/brow ptosis, bruising, headache, asymmetry, swelling, diplopia, temporary effect, and incomplete chemical denervation.\\n- Advised that it can take up to 14 days for the full effect of the Botox to take place.\\n- Generally the effects of Botox are expected to last for 3-4 months, but length of duration can vary.
Dilution (U/0.1 Cc): 10

## 2022-09-12 NOTE — PROCEDURE: COUNSELING
Detail Level: Detailed
Patient Specific Counseling (Will Not Stick From Patient To Patient): - Treatment is available but not medically necessary as patient denies any pain, itch, bleeding, or changes.\\n- Discussed option for cosmetic shave removal.\\n- Cost of removal: $150
Patient Specific Counseling (Will Not Stick From Patient To Patient): - The most common method for treatment is extraction.\\n- Treated lesions may recur, but more will likely develop over time.\\n- Patient requested treatment today.\\n- Advised that treatment is available but not medically necessary.\\n- Recommended treatment with extraction and discussed cost of treatment.\\n- Patient expressed understanding.\\n\\nCost of treatment today: $150

## 2022-09-12 NOTE — PROCEDURE: COSMETIC SHAVE REMOVAL (NO PATHOLOGY)
Size Of Lesion In Cm: 0.5
Wound Care: Petrolatum
Post-Care Instructions: WOUND CARE:\\nDo NOT submerge wound in a bath, swimming pool, or hot tub for at least 1 week or for as long as there is an open wound. Gently cleanse the site daily with mild soap and water. Be careful NOT to allow a forceful stream of water to hit the biopsy site. After cleaning/showering, apply a thin layer of petrolatum ointment or Aquaphor in the wound followed by an adhesive bandage. Continue this process daily until healed. \\n\\nBLEEDING:\\nIf you develop persistent bleeding, apply firm and steady pressure over the dressing with gauze for 15 minutes. If bleeding persists, reapply pressure with an ice pack over the gauze for 15 minutes. NEVER APPLY ICE DIRECTLY TO THE SKIN. Do NOT peek under the gauze during these 15 minutes of pressure.  Call our office at [phone] if you cannot get the bleeding to stop. \\n\\nINFECTION:\\nSigns of infection may include increasing rather than decreasing pain (after the first few days), increasing redness/swelling/heat, draining pus, pink/red streaks around the wound, and fever or chills.  Please call our office immediately at [phone] if infection is suspected. It is normal to have some mild redness on or around the wound edges; this will lighten day by day and will become less tender.\\n\\nPAIN:\\nPain is usually minimal, but if needed you may take acetaminophen (Tylenol) every four hours as needed. Applying an ice pack over the dressing for 15-20 minutes every 2-3 hours will relieve swelling, lessen pain, and help minimize bruising. NEVER APPLY ICE DIRECTLY TO THE SKIN. Avoid ibuprofen (Advil, Motrin) and naproxen (Aleve) for the first 48 hours as these can increase bleeding.\\n\\nSWELLING AND BRUISING:\\nSwelling and bruising are common and temporary, usually lasting 1 - 2 weeks. It is more common in areas treated around the eyes, hands, and feet. In the days following the procedure, swelling and bruising can be minimized by keeping the affected areas elevated when possible, reducing salty foods, and applying ice packs over the dressing for 15-20 minutes every 2-3 hours. NEVER APPLY ICE DIRECTLY TO THE SKIN.\\n\\nITCHING:\\nItchiness on and around the wound is very common and can last several days to weeks after surgery. Mild itch is normal as the wound is healing. \\n\\nNERVE CHANGES:\\nNumbness is usually temporary, but it may last for several weeks to months. You may also experience sharp pains at the wound sight as it heals.  Mild pain is normal and will gradually improve with time.\\n \\nNO SMOKING:\\nSmoking will delay the healing process. If you smoke, we recommend trying to quit or at minimum reduce how much you smoke following a procedure.
Size Of Margin In Cm: 0
Price (Use Numbers Only, No Special Characters Or $): 200
Anesthesia Type: 2% lidocaine with epinephrine
Render Post-Care Instructions In Note?: no
Anesthesia Volume In Cc: 0.1
Hemostasis: Electrocautery and Aluminum Chloride
Consent: - Verbal and written consent was obtained, and risks were reviewed prior to procedure today. \\n- Risks discussed include but are not limited to scarring, infection, bleeding, scabbing, incomplete removal, nerve damage, pain, and allergy to anesthesia. \\n- Advised that in some cases nevi grow back after shave removal.\\n- All components of Universal Protocol/PAUSE Rule completed. \\n- Patient understands that this treatment is not medically necessary and therefore not billable to insurance. \\n- Patient understands that payment is due today before leaving clinic.\\n- Advised patient that it is our policy to recommend sending all specimen removed from the body to the pathologist to confirm that the lesion is indeed benign as clinically suspected. \\n- Discussed the anticipated cost of diagnostic pathology. \\n- Patient expressed understanding and made an informed decision today to decline diagnostic pathology.
Detail Level: Detailed

## 2022-09-12 NOTE — PROCEDURE: BENIGN DESTRUCTION COSMETIC
Price (Use Numbers Only, No Special Characters Or $): 100
Detail Level: Detailed
Anesthesia Volume In Cc: 0.5
Post-Care Instructions: - Patient was instructed to avoid picking at any of the treated lesions.\\n- Vaseline ointment or Aquaphor can be applied to any open wounds daily until healed.
Consent: - Verbal and written consent was obtained, and risks were reviewed prior to procedure today.\\n- Risks discussed include but are not limited to pain, crusting, scabbing, scarring, temporary or permanent pigmentary change, recurrence, incomplete resolution, and infection.\\n- The patient understands that the procedure is cosmetic in nature and is not covered by or billable to insurance.

## 2022-09-26 ENCOUNTER — APPOINTMENT (OUTPATIENT)
Dept: URBAN - METROPOLITAN AREA CLINIC 252 | Age: 42
Setting detail: DERMATOLOGY
End: 2022-09-29

## 2022-09-26 VITALS — HEIGHT: 68 IN | WEIGHT: 160 LBS

## 2022-09-26 DIAGNOSIS — Z71.89 OTHER SPECIFIED COUNSELING: ICD-10-CM

## 2022-09-26 DIAGNOSIS — D22 MELANOCYTIC NEVI: ICD-10-CM

## 2022-09-26 DIAGNOSIS — L82.1 OTHER SEBORRHEIC KERATOSIS: ICD-10-CM

## 2022-09-26 PROBLEM — D22.72 MELANOCYTIC NEVI OF LEFT LOWER LIMB, INCLUDING HIP: Status: ACTIVE | Noted: 2022-09-26

## 2022-09-26 PROBLEM — D23.9 OTHER BENIGN NEOPLASM OF SKIN, UNSPECIFIED: Status: ACTIVE | Noted: 2022-09-26

## 2022-09-26 PROBLEM — D22.5 MELANOCYTIC NEVI OF TRUNK: Status: ACTIVE | Noted: 2022-09-26

## 2022-09-26 PROBLEM — D22.71 MELANOCYTIC NEVI OF RIGHT LOWER LIMB, INCLUDING HIP: Status: ACTIVE | Noted: 2022-09-26

## 2022-09-26 PROBLEM — D48.5 NEOPLASM OF UNCERTAIN BEHAVIOR OF SKIN: Status: ACTIVE | Noted: 2022-09-26

## 2022-09-26 PROCEDURE — OTHER COUNSELING: OTHER

## 2022-09-26 PROCEDURE — OTHER BIOPSY BY SHAVE METHOD: OTHER

## 2022-09-26 PROCEDURE — 99213 OFFICE O/P EST LOW 20 MIN: CPT | Mod: 25

## 2022-09-26 PROCEDURE — 11102 TANGNTL BX SKIN SINGLE LES: CPT

## 2022-09-26 ASSESSMENT — LOCATION SIMPLE DESCRIPTION DERM
LOCATION SIMPLE: LEFT UPPER BACK
LOCATION SIMPLE: RIGHT POSTERIOR UPPER ARM
LOCATION SIMPLE: RIGHT UPPER BACK
LOCATION SIMPLE: RIGHT THIGH
LOCATION SIMPLE: RIGHT LOWER BACK
LOCATION SIMPLE: LEFT POSTERIOR UPPER ARM
LOCATION SIMPLE: LEFT POSTERIOR THIGH
LOCATION SIMPLE: LEFT LOWER BACK
LOCATION SIMPLE: RIGHT POSTERIOR THIGH

## 2022-09-26 ASSESSMENT — LOCATION DETAILED DESCRIPTION DERM
LOCATION DETAILED: RIGHT INFERIOR UPPER BACK
LOCATION DETAILED: LEFT INFERIOR LATERAL MIDBACK
LOCATION DETAILED: RIGHT PROXIMAL POSTERIOR UPPER ARM
LOCATION DETAILED: LEFT PROXIMAL POSTERIOR THIGH
LOCATION DETAILED: RIGHT ANTERIOR DISTAL THIGH
LOCATION DETAILED: RIGHT SUPERIOR LATERAL MIDBACK
LOCATION DETAILED: RIGHT DISTAL POSTERIOR THIGH
LOCATION DETAILED: LEFT DISTAL POSTERIOR UPPER ARM
LOCATION DETAILED: LEFT MEDIAL UPPER BACK

## 2022-09-26 ASSESSMENT — LOCATION ZONE DERM
LOCATION ZONE: TRUNK
LOCATION ZONE: LEG
LOCATION ZONE: ARM

## 2022-09-26 NOTE — PROCEDURE: COUNSELING
Detail Level: Detailed
Detail Level: Zone
Patient Specific Counseling (Will Not Stick From Patient To Patient): - Discussed that this nevus has atypical features that warrant a biopsy.\\n- Patient expressed understanding.\\n- Follow-up for re-examination for regimentation or an additional removal procedure may become necessary depending the pathologist's report.

## 2022-09-26 NOTE — PROCEDURE: BIOPSY BY SHAVE METHOD
Size Of Lesion In Cm: 0
Notification Instructions: - It can take up to 2 weeks to get a biopsy result. \\n- Please refrain from calling to request results until after 2 weeks.
Biopsy Type: H and E
Hide Second Anesthesia?: Yes
Validate Triangulation: No
Biopsy Method: Dermablade
Depth Of Biopsy: dermis
Anesthesia Type: 2% lidocaine with epinephrine
Dressing: bandage
Detail Level: Detailed
Wound Care: Petrolatum
Electrodesiccation Text: The wound bed was treated with electrodesiccation after the biopsy was performed.
Post-Care Instructions: WOUND CARE:\\nDo NOT submerge wound in a bath, swimming pool, or hot tub for at least 1 week or for as long as there is an open wound. Gently cleanse the site daily with mild soap and water. Be careful NOT to allow a forceful stream of water to hit the biopsy site. After cleaning/showering, apply a thin layer of petrolatum ointment or Aquaphor in the wound followed by an adhesive bandage. Continue this process daily until healed. \\n\\nBLEEDING:\\nIf you develop persistent bleeding, apply firm and steady pressure over the dressing with gauze for 15 minutes. If bleeding persists, reapply pressure with an ice pack over the gauze for 15 minutes. NEVER APPLY ICE DIRECTLY TO THE SKIN. Do NOT peek under the gauze during these 15 minutes of pressure.  Call our office at [phone] if you cannot get the bleeding to stop. \\n\\nINFECTION:\\nSigns of infection may include increasing rather than decreasing pain (after the first few days), increasing redness/swelling/heat, draining pus, pink/red streaks around the wound, and fever or chills.  Please call our office immediately at [phone] if infection is suspected. It is normal to have some mild redness on or around the wound edges; this will lighten day by day and will become less tender.\\n\\nPAIN:\\nPain is usually minimal, but if needed you may take acetaminophen (Tylenol) every four hours as needed. Applying an ice pack over the dressing for 15-20 minutes every 2-3 hours will relieve swelling, lessen pain, and help minimize bruising. NEVER APPLY ICE DIRECTLY TO THE SKIN. Avoid ibuprofen (Advil, Motrin) and naproxen (Aleve) for the first 48 hours as these can increase bleeding.\\n\\nSWELLIG AND BRUISING:\\nSwelling and bruising are common and temporary, usually lasting 1 - 2 weeks. It is more common in areas treated around the eyes, hands, and feet. In the days following the procedure, swelling and bruising can be minimized by keeping the affected areas elevated when possible, reducing salty foods, and applying ice packs over the dressing for 15-20 minutes every 2-3 hours. NEVER APPLY ICE DIRECTLY TO THE SKIN.\\n\\nITCHING:\\nItchiness on and around the wound is very common and can last several days to weeks after surgery. Mild itch is normal as the wound is healing. \\n\\nNERVE CHANGES:\\nNumbness is usually temporary, but it may last for several weeks to months. You may also experience sharp pains at the wound sight as it heals.  Mild pain is normal and will gradually improve with time.\\n \\nNO SMOKING:\\nSmoking will delay the healing process. If you smoke, we recommend trying to quit or at minimum reduce how much you smoke following a procedure.
Type Of Destruction Used: Electrodesiccation
Hemostasis: Aluminum Chloride
Cryotherapy Text: The wound bed was treated with cryotherapy after the biopsy was performed.
Information: Selecting Yes will display possible errors in your note based on the variables you have selected. This validation is only offered as a suggestion for you. PLEASE NOTE THAT THE VALIDATION TEXT WILL BE REMOVED WHEN YOU FINALIZE YOUR NOTE. IF YOU WANT TO FAX A PRELIMINARY NOTE YOU WILL NEED TO TOGGLE THIS TO 'NO' IF YOU DO NOT WANT IT IN YOUR FAXED NOTE.
Silver Nitrate Text: The wound bed was treated with silver nitrate after the biopsy was performed.
Path Notes Override (Will Replace All Of The Above Text): NROD
Anesthesia Volume In Cc (Will Not Render If 0): 0.5
Consent: - Verbal and written consent was obtained and risks were reviewed prior to procedure today. \\n- Risks discussed include but are not limited to scarring, infection, bleeding, scabbing, incomplete removal, nerve damage, pain, and allergy to anesthesia.
Billing Type: Third-Party Bill
Electrodesiccation And Curettage Text: The wound bed was treated with electrodesiccation and curettage after the biopsy was performed.
Curettage Text: The wound bed was treated with curettage after the biopsy was performed.

## 2022-09-27 ENCOUNTER — APPOINTMENT (OUTPATIENT)
Dept: URBAN - METROPOLITAN AREA CLINIC 252 | Age: 42
Setting detail: DERMATOLOGY
End: 2022-09-27

## 2022-09-27 DIAGNOSIS — Z41.9 ENCOUNTER FOR PROCEDURE FOR PURPOSES OTHER THAN REMEDYING HEALTH STATE, UNSPECIFIED: ICD-10-CM

## 2022-09-27 PROCEDURE — OTHER BOTOX (U OR CC): OTHER

## 2022-09-27 NOTE — PROCEDURE: BOTOX (U OR CC)
Including Pricing Information In The Note: Yes
Additional Area 6 Units: 0
Consent: - Verbal and written consent obtained. \\n- Discussed the risks that include but not limited to lid/brow ptosis, bruising, headache, asymmetry, swelling, diplopia, temporary effect, and incomplete chemical denervation.\\n- Advised that it can take up to 14 days for the full effect of the Botox to take place.\\n- Generally the effects of Botox are expected to last for 3-4 months, but length of duration can vary.
Document As Units Or Cc?: units
Dilution (U/0.1 Cc): 10
Detail Level: Detailed
Post-Care Instructions: - Skin was cleansed with 70% isopropyl alcohol prior to injection today.\\n- Patient was instructed to not lie down for touch area for 6 hrs.\\n\\nTOTAL BOTOX USED TODAY: 1 unit\\n\\nCOLOR KEY FOR DIAGRAM BELOW:\\nRed = 1/2 unit of Botox per injection\\nOrange = 1 units of Botox per injection\\nYellow = 2 units of Botox per injection\\nGreen = 3 units of Botox per injection\\nBlue = 4 units of Botox per injection\\nBrown = 5 units of Botox per injection\\nBlack = 6 units of Botox per injection

## 2022-10-02 ENCOUNTER — OFFICE VISIT (OUTPATIENT)
Dept: URGENT CARE | Facility: URGENT CARE | Age: 42
End: 2022-10-02
Payer: COMMERCIAL

## 2022-10-02 VITALS
RESPIRATION RATE: 16 BRPM | DIASTOLIC BLOOD PRESSURE: 84 MMHG | HEART RATE: 100 BPM | SYSTOLIC BLOOD PRESSURE: 136 MMHG | OXYGEN SATURATION: 100 % | TEMPERATURE: 98 F

## 2022-10-02 DIAGNOSIS — T14.8XXA WOUND INFECTION: ICD-10-CM

## 2022-10-02 DIAGNOSIS — L08.9 WOUND INFECTION: ICD-10-CM

## 2022-10-02 DIAGNOSIS — L03.115 CELLULITIS OF RIGHT LEG: Primary | ICD-10-CM

## 2022-10-02 DIAGNOSIS — Z86.19 HISTORY OF SEPSIS: ICD-10-CM

## 2022-10-02 PROCEDURE — 87070 CULTURE OTHR SPECIMN AEROBIC: CPT | Performed by: NURSE PRACTITIONER

## 2022-10-02 PROCEDURE — 99213 OFFICE O/P EST LOW 20 MIN: CPT | Performed by: NURSE PRACTITIONER

## 2022-10-02 RX ORDER — SULFAMETHOXAZOLE/TRIMETHOPRIM 800-160 MG
1 TABLET ORAL 2 TIMES DAILY
Qty: 14 TABLET | Refills: 0 | Status: SHIPPED | OUTPATIENT
Start: 2022-10-02 | End: 2022-10-09

## 2022-10-02 NOTE — PROGRESS NOTES
Assessment & Plan     Cellulitis of right leg    - sulfamethoxazole-trimethoprim (BACTRIM DS) 800-160 MG tablet  Dispense: 14 tablet; Refill: 0    Wound infection    - Wound Aerobic Bacterial Culture Routine    History of sepsis        Prescription sent to pharmacy for Bactrim twice daily for cellulitis right thigh. Wound culture obtained, will notify if she should change abx. Wound is cleansed with hibicleanse and antibiotic ointment and sterile dressing applied. Erythema is outlined. Keep skin clean and dry. Watch closely for worsening symptoms of infection including fever, chills, redness, swelling, pain, purulent discharge and follow-up right away if develops.     Follow-up with PCP if symptoms persist for 5 days, and sooner if symptoms worsen or new symptoms develop.     Discussed red flag symptoms which warrant immediate visit in emergency room    All questions were answered and patient verbalized understanding. AVS reviewed with patient.     Karoline Moran, DMITRY, APRN, CNP 10/2/2022 7:07 PM  [facility] [place]        Kristofer Florez is a 42 year old female who presents to clinic today for the following health issues:  Chief Complaint   Patient presents with     Urgent Care     Derm Problem     Per pt states she has a biopsy on right thigh this past Wednesday and yesterday she noticed it was getting infected, redness has expanded and white center      Patient presents for evaluation of possible skin infection. She had a biopsy on her right upper leg 4 days ago and has developed redness with a white center at the biopsy site. Symptoms have been worsening. She has been applying antibiotic ointment which hasn't helped. Associated symptoms: tenderness. Denies fever, chills, drainage, streaking. No history of MRSA. She has a history of sepsis after ablation.     Problem list, Medication list, Allergies, and Medical history reviewed in EPIC.    ROS:  Review of systems negative except for  noted above        Objective    /84   Pulse 100   Temp 98  F (36.7  C) (Tympanic)   Resp 16   SpO2 100%   Physical Exam  Constitutional:       General: She is not in acute distress.     Appearance: She is not toxic-appearing or diaphoretic.   Cardiovascular:      Rate and Rhythm: Normal rate and regular rhythm.      Heart sounds: Normal heart sounds.   Pulmonary:      Effort: Pulmonary effort is normal. No respiratory distress.      Breath sounds: Normal breath sounds. No wheezing, rhonchi or rales.   Musculoskeletal:      Comments: No swelling right thigh, tenderness with palpation   Lymphadenopathy:      Cervical: No cervical adenopathy.   Skin:     General: Skin is warm.      Findings: Erythema present.      Comments: Punch biopsy site right lateral thigh with 5 mm white center with approx 1.5 cm surrounding erythema without abscess or increased warmth   Neurological:      Mental Status: She is alert.              Verbal consent obtained from patient to take photo for medical electronic health record

## 2022-10-05 LAB — BACTERIA WND CULT: NO GROWTH

## 2023-01-04 ENCOUNTER — ANCILLARY ORDERS (OUTPATIENT)
Dept: FAMILY MEDICINE | Facility: CLINIC | Age: 43
End: 2023-01-04

## 2023-01-04 DIAGNOSIS — Z12.31 VISIT FOR SCREENING MAMMOGRAM: ICD-10-CM

## 2023-01-12 ENCOUNTER — ANCILLARY PROCEDURE (OUTPATIENT)
Dept: MAMMOGRAPHY | Facility: CLINIC | Age: 43
End: 2023-01-12
Payer: COMMERCIAL

## 2023-01-12 DIAGNOSIS — Z12.31 VISIT FOR SCREENING MAMMOGRAM: ICD-10-CM

## 2023-01-12 PROCEDURE — 77067 SCR MAMMO BI INCL CAD: CPT | Mod: TC | Performed by: RADIOLOGY

## 2023-01-12 PROCEDURE — 77063 BREAST TOMOSYNTHESIS BI: CPT | Mod: TC | Performed by: RADIOLOGY

## 2023-01-24 ENCOUNTER — APPOINTMENT (OUTPATIENT)
Dept: URBAN - METROPOLITAN AREA CLINIC 252 | Age: 43
Setting detail: DERMATOLOGY
End: 2023-01-29

## 2023-01-24 DIAGNOSIS — Z41.9 ENCOUNTER FOR PROCEDURE FOR PURPOSES OTHER THAN REMEDYING HEALTH STATE, UNSPECIFIED: ICD-10-CM

## 2023-01-24 PROCEDURE — OTHER BOTOX (U OR CC): OTHER

## 2023-01-24 NOTE — PROCEDURE: BOTOX (U OR CC)
Post-Care Instructions: - Skin was cleansed with 70% isopropyl alcohol prior to injection today.\\n- Patient was instructed to not lie down for touch area for 6 hrs.\\n\\nTOTAL BOTOX USED TODAY: 25 units\\n\\nCOLOR KEY FOR DIAGRAM BELOW:\\nRed = 1/2 unit of Botox per injection\\nOrange = 1 units of Botox per injection\\nYellow = 2 units of Botox per injection\\nGreen = 3 units of Botox per injection\\nBlue = 4 units of Botox per injection\\nBrown = 5 units of Botox per injection\\nBlack = 6 units of Botox per injection

## 2023-01-24 NOTE — PROCEDURE: BOTOX (U OR CC)
Price (Use Numbers Only, No Special Characters Or $): 976 Price (Use Numbers Only, No Special Characters Or $): 603

## 2023-02-03 ENCOUNTER — APPOINTMENT (OUTPATIENT)
Dept: URBAN - METROPOLITAN AREA CLINIC 252 | Age: 43
Setting detail: DERMATOLOGY
End: 2023-02-13

## 2023-02-03 ENCOUNTER — OFFICE VISIT (OUTPATIENT)
Dept: FAMILY MEDICINE | Facility: CLINIC | Age: 43
End: 2023-02-03
Payer: COMMERCIAL

## 2023-02-03 VITALS
SYSTOLIC BLOOD PRESSURE: 100 MMHG | BODY MASS INDEX: 24.57 KG/M2 | TEMPERATURE: 98 F | WEIGHT: 171.6 LBS | OXYGEN SATURATION: 100 % | DIASTOLIC BLOOD PRESSURE: 80 MMHG | HEIGHT: 70 IN | RESPIRATION RATE: 14 BRPM | HEART RATE: 71 BPM

## 2023-02-03 DIAGNOSIS — Z87.898 H/O MOTION SICKNESS: Primary | ICD-10-CM

## 2023-02-03 DIAGNOSIS — Z71.89 ADVANCED CARE PLANNING/COUNSELING DISCUSSION: ICD-10-CM

## 2023-02-03 DIAGNOSIS — Z41.9 ENCOUNTER FOR PROCEDURE FOR PURPOSES OTHER THAN REMEDYING HEALTH STATE, UNSPECIFIED: ICD-10-CM

## 2023-02-03 PROCEDURE — OTHER BOTOX (U OR CC): OTHER

## 2023-02-03 PROCEDURE — 99213 OFFICE O/P EST LOW 20 MIN: CPT | Performed by: STUDENT IN AN ORGANIZED HEALTH CARE EDUCATION/TRAINING PROGRAM

## 2023-02-03 RX ORDER — SCOLOPAMINE TRANSDERMAL SYSTEM 1 MG/1
1 PATCH, EXTENDED RELEASE TRANSDERMAL
Qty: 10 PATCH | Refills: 1 | Status: SHIPPED | OUTPATIENT
Start: 2023-02-03 | End: 2024-06-25

## 2023-02-03 RX ORDER — GALCANEZUMAB 120 MG/ML
INJECTION, SOLUTION SUBCUTANEOUS
COMMUNITY
Start: 2022-11-16 | End: 2024-06-25

## 2023-02-03 ASSESSMENT — PAIN SCALES - GENERAL: PAINLEVEL: NO PAIN (0)

## 2023-02-03 NOTE — PATIENT INSTRUCTIONS
[place],    Thank you for allowing [facility] to manage your care.    I sent your prescriptions to your pharmacy.    For your convenience, test results are released as soon as they are available  Please allow 1-2 business days for me to send you a comment about your results.  If not done so, I encourage you to login into [employer] [email or web address] to review your results in real time.     If you have any questions or concerns, please feel free to call us at [phone].    Sincerely,    Dr. Ellis    Did you know?      You can schedule a video visit for follow-up appointments as well as future appointments for certain conditions.  Please see the below link.     [email or web address]    If you have not already done so,  I encourage you to sign up for [employer] ([email or web address]).  This will allow you to review your results, securely communicate with a provider, and schedule virtual visits as well.

## 2023-02-03 NOTE — PROGRESS NOTES
"  Assessment & Plan     1. H/O motion sickness    - scopolamine (TRANSDERM) 1 MG/3DAYS 72 hr patch; Place 1 patch onto the skin every 72 hours (apply 1 patch every 3 days prn; Start: >4h before event; Info: do not cut patch)  Dispense: 10 patch; Refill: 1    2. Advanced care planning/counseling discussion  Information given        Return in about 4 weeks (around 3/3/2023) for Follow up, Routine preventive, in person.    Edyta Ellis MD  [facility] GINGER [place] is a 42 year old, presenting for the following health issues:  Recheck Medication and Medication Reconciliation (Patient states she is taking ibuprofen for headaches. )      History of Present Illness       Reason for visit:  Get prescription for a cruise    She eats 2-3 servings of fruits and vegetables daily.She consumes 0 sweetened beverage(s) daily.She exercises with enough effort to increase her heart rate 30 to 60 minutes per day.  She exercises with enough effort to increase her heart rate 3 or less days per week.   She is taking medications regularly.    Patient going on a cruise would like motion sickness medication. She Is going on a cruise for 10 days.She has used   scopolamine patch before.  Review of Systems   Constitutional, HEENT, cardio.scular, pulmonary, gi and gu systems are negative, except as otherwise noted.      Objective    /80   Pulse 71   Temp 98  F (36.7  C) (Temporal)   Resp 14   Ht 1.778 m (5' 10\")   Wt 77.8 kg (171 lb 9.6 oz)   LMP 11/20/2019 (Exact Date)   SpO2 100%   BMI 24.62 kg/m    Body mass index is 24.62 kg/m .  Physical Exam   GENERAL: healthy, alert and no distress  RESP: lungs clear to auscultation - no rales, rhonchi or wheezes  CV: regular rate and rhythm, normal S1 S2, no S3 or S4, no murmur,  MS: no gross musculoskeletal defects noted,           "

## 2023-02-03 NOTE — PROCEDURE: BOTOX (U OR CC)
Post-Care Instructions: - Skin was cleansed with 70% isopropyl alcohol prior to injection today.\\n- Patient was instructed to not lie down for touch area for 6 hrs.\\n\\nTOTAL BOTOX USED TODAY: 9 units\\n\\nCOLOR KEY FOR DIAGRAM BELOW:\\nRed = 1/2 unit of Botox per injection\\nOrange = 1 units of Botox per injection\\nYellow = 2 units of Botox per injection\\nGreen = 3 units of Botox per injection\\nBlue = 4 units of Botox per injection\\nBrown = 5 units of Botox per injection\\nBlack = 6 units of Botox per injection

## 2023-04-19 ENCOUNTER — NURSE TRIAGE (OUTPATIENT)
Dept: NURSING | Facility: CLINIC | Age: 43
End: 2023-04-19
Payer: COMMERCIAL

## 2023-04-19 ENCOUNTER — OFFICE VISIT (OUTPATIENT)
Dept: URGENT CARE | Facility: URGENT CARE | Age: 43
End: 2023-04-19
Payer: COMMERCIAL

## 2023-04-19 VITALS
DIASTOLIC BLOOD PRESSURE: 84 MMHG | WEIGHT: 173.6 LBS | BODY MASS INDEX: 24.91 KG/M2 | SYSTOLIC BLOOD PRESSURE: 123 MMHG | HEART RATE: 82 BPM | OXYGEN SATURATION: 100 % | RESPIRATION RATE: 16 BRPM | TEMPERATURE: 97.8 F

## 2023-04-19 DIAGNOSIS — J02.9 ACUTE PHARYNGITIS, UNSPECIFIED ETIOLOGY: Primary | ICD-10-CM

## 2023-04-19 LAB
DEPRECATED S PYO AG THROAT QL EIA: NEGATIVE
GROUP A STREP BY PCR: NOT DETECTED

## 2023-04-19 PROCEDURE — U0003 INFECTIOUS AGENT DETECTION BY NUCLEIC ACID (DNA OR RNA); SEVERE ACUTE RESPIRATORY SYNDROME CORONAVIRUS 2 (SARS-COV-2) (CORONAVIRUS DISEASE [COVID-19]), AMPLIFIED PROBE TECHNIQUE, MAKING USE OF HIGH THROUGHPUT TECHNOLOGIES AS DESCRIBED BY CMS-2020-01-R: HCPCS | Performed by: PHYSICIAN ASSISTANT

## 2023-04-19 PROCEDURE — 87651 STREP A DNA AMP PROBE: CPT | Performed by: PHYSICIAN ASSISTANT

## 2023-04-19 PROCEDURE — 99213 OFFICE O/P EST LOW 20 MIN: CPT | Mod: CS | Performed by: PHYSICIAN ASSISTANT

## 2023-04-19 PROCEDURE — U0005 INFEC AGEN DETEC AMPLI PROBE: HCPCS | Performed by: PHYSICIAN ASSISTANT

## 2023-04-19 RX ORDER — METHYLPREDNISOLONE 4 MG
TABLET, DOSE PACK ORAL
Qty: 21 TABLET | Refills: 0 | Status: SHIPPED | OUTPATIENT
Start: 2023-04-19 | End: 2024-06-25

## 2023-04-19 RX ORDER — RIZATRIPTAN BENZOATE 10 MG/1
TABLET, ORALLY DISINTEGRATING ORAL
COMMUNITY
Start: 2023-04-04 | End: 2024-06-25

## 2023-04-19 RX ORDER — CYCLOBENZAPRINE HCL 10 MG
TABLET ORAL
COMMUNITY
Start: 2023-04-13

## 2023-04-19 RX ORDER — SUMATRIPTAN 100 MG/1
TABLET, FILM COATED ORAL
COMMUNITY
Start: 2023-02-06

## 2023-04-19 NOTE — PATIENT INSTRUCTIONS
"Rapid strep negative.  Steroid pack starting today  Drink plenty of fluids and rest.  May use salt water gargles- about 8 oz warm water with about 1 teaspoon salt  Over the counter pain relievers such as Tylenol or ibuprofen may be used as needed.   Honey lemon tea helps to soothe the throat. \"Throat Coat\" tea is soothing as well.  Please follow up with primary care provider if not improving, worsening or new symptoms.  "

## 2023-04-19 NOTE — PROGRESS NOTES
Assessment & Plan     Acute pharyngitis, unspecified etiology  - Streptococcus A Rapid Screen w/Reflex to PCR - Clinic Collect  - Symptomatic COVID-19 Virus (Coronavirus) by PCR Nose  - Group A Streptococcus PCR Throat Swab  - methylPREDNISolone (MEDROL DOSEPAK) 4 MG tablet therapy pack; Follow Package Directions    Treat inflammation with medrol dose pack. If symptoms are not improving, follow up with primary care provider for further evaluation.    Return in about 1 week (around 4/26/2023) for visit with primary care provider if not improving.     Preeti Lee PA-C  [facility]    Subjective   Vikki Merlos is a 42 year old who presents for the following health issues     Patient presents with:  Sick: Throat has been bothering her x1 week  Negative strep test on Saturday at [facility]   Negative home covid test     HPI    Opal presents clinic today for evaluation of a sore throat.  Symptoms first began 1 week ago.  She was seen at [facility] 3 days later and a rapid strep test was negative.  She been trying to drink plenty of fluids but notes that the pain seems to be getting worse.  No fever.  She does not feel sick.  She does not have nasal congestion ear pain or other symptoms.  She only feels the pain in her throat when swallowing or eating.      Review of Systems   ROS negative except as stated above.      Objective    /84   Pulse 82   Temp 97.8  F (36.6  C) (Tympanic)   Resp 16   Wt 78.7 kg (173 lb 9.6 oz)   LMP 11/20/2019 (Exact Date)   SpO2 100%   BMI 24.91 kg/m    Physical Exam   GENERAL: healthy, alert and no distress  EYES: Eyes grossly normal to inspection, PERRL and conjunctivae and sclerae normal  HENT: ear canals and TM's normal, nose and mouth without ulcers or lesions, posterior pharynx non erythematous. Mild post nasal drip, Minimal swelling of right anterior tonsillar pillar, no tonsillar hypertrophy or exudate  NECK: no adenopathy, no  asymmetry, masses, or scars and thyroid normal to palpation  RESP: lungs clear to auscultation - no rales, rhonchi or wheezes  CV: regular rate and rhythm, normal S1 S2, no S3 or S4, no murmur, click or rub, no peripheral edema and peripheral pulses strong    Results for orders placed or performed in visit on 04/19/23   Streptococcus A Rapid Screen w/Reflex to PCR - Clinic Collect     Status: Normal    Specimen: Throat; Swab   Result Value Ref Range    Group A Strep antigen Negative Negative

## 2023-04-19 NOTE — TELEPHONE ENCOUNTER
"Has had a \"really strange sore throat\".  Was seen last Saturday in [facility]   Sore throat started 4/12/23.  Strep and covid both negative.  Back of throat on right side has a white patch.  Very painful. She said if feels like a rock is in there when she swallows.  Able to open her mouth fully.  Has not had a fever.  Has been tired.  Hasn't tried Tylenol or ibuprofen.  Drinking a lot of water because if feels better when she does.    Per the protocol, I recommended she be seen in office today.  Caller stated understanding and agreement.  She'll try to make an appointment. Will go to  if no appointments available.  Transferred to scheduling.        Reason for Disposition    SEVERE sore throat pain    Additional Information    Negative: SEVERE difficulty breathing (e.g., struggling for each breath, speaks in single words)    Negative: Sounds like a life-threatening emergency to the triager    Negative: Drooling or spitting out saliva (because can't swallow)    Negative: Unable to open mouth completely    Negative: Drinking very little and has signs of dehydration (e.g., no urine > 12 hours, very dry mouth, very lightheaded)    Negative: Patient sounds very sick or weak to the triager    Negative: Difficulty breathing (per caller) but not severe    Negative: Fever > 103 F (39.4 C)    Negative: Refuses to drink anything for > 12 hours    Protocols used: SORE THROAT-A-OH    Germaine SILVA RN [facility]     "

## 2023-04-20 LAB — SARS-COV-2 RNA RESP QL NAA+PROBE: NEGATIVE

## 2023-04-22 ENCOUNTER — HEALTH MAINTENANCE LETTER (OUTPATIENT)
Age: 43
End: 2023-04-22

## 2023-05-09 ENCOUNTER — TELEPHONE (OUTPATIENT)
Dept: FAMILY MEDICINE | Facility: CLINIC | Age: 43
End: 2023-05-09
Payer: COMMERCIAL

## 2023-05-09 NOTE — TELEPHONE ENCOUNTER
Patient calling, she was seen in  in April for sore throat, says she was put on steroid.  Strep, flu, covid was negative.    She says 2 days ago the same issue has recurred.   Says right side is swollen and red, does not see any pus or drainage.   Says left side looks fine, able to eat and drink, talking clearly, denies trouble breathing.    She has also had a migraine for 3 days.       She takes the tramadol, sumatariptin for migraine, only helps for a few hours.   Says is one of her worst migraines when the med stops working.    She says she stopped the rest of her migraine meds as there was a concern about whether one of those was causing the throat swelling, so obviously that is not the cause as it recurred.    Does not have a PCP, scheduled to see Dr. Burr tomorrow as she has seen her in February.    Advised ER/911 if having rapidly escalating headache or throat swelling.    Patient verbalized understanding of and agreement with plan.    Kezia Cai RN  [facility]

## 2023-05-10 ENCOUNTER — ANCILLARY PROCEDURE (OUTPATIENT)
Dept: ULTRASOUND IMAGING | Facility: CLINIC | Age: 43
End: 2023-05-10
Attending: STUDENT IN AN ORGANIZED HEALTH CARE EDUCATION/TRAINING PROGRAM
Payer: COMMERCIAL

## 2023-05-10 ENCOUNTER — OFFICE VISIT (OUTPATIENT)
Dept: FAMILY MEDICINE | Facility: CLINIC | Age: 43
End: 2023-05-10
Payer: COMMERCIAL

## 2023-05-10 VITALS
OXYGEN SATURATION: 100 % | BODY MASS INDEX: 24.65 KG/M2 | SYSTOLIC BLOOD PRESSURE: 100 MMHG | HEIGHT: 70 IN | DIASTOLIC BLOOD PRESSURE: 72 MMHG | TEMPERATURE: 99 F | HEART RATE: 74 BPM | WEIGHT: 172.2 LBS | RESPIRATION RATE: 22 BRPM

## 2023-05-10 DIAGNOSIS — M54.2 NECK DISCOMFORT: ICD-10-CM

## 2023-05-10 DIAGNOSIS — J35.1 SWOLLEN TONSIL: ICD-10-CM

## 2023-05-10 DIAGNOSIS — E04.2 MULTINODULAR THYROID: ICD-10-CM

## 2023-05-10 DIAGNOSIS — R59.1 LAD (LYMPHADENOPATHY): ICD-10-CM

## 2023-05-10 DIAGNOSIS — J35.1 SWOLLEN TONSIL: Primary | ICD-10-CM

## 2023-05-10 LAB
BASOPHILS # BLD AUTO: 0 10E3/UL (ref 0–0.2)
BASOPHILS NFR BLD AUTO: 1 %
EOSINOPHIL # BLD AUTO: 0.1 10E3/UL (ref 0–0.7)
EOSINOPHIL NFR BLD AUTO: 3 %
ERYTHROCYTE [DISTWIDTH] IN BLOOD BY AUTOMATED COUNT: 11.7 % (ref 10–15)
ERYTHROCYTE [DISTWIDTH] IN BLOOD BY AUTOMATED COUNT: 11.7 % (ref 10–15)
HCT VFR BLD AUTO: 40 % (ref 35–47)
HCT VFR BLD AUTO: 40.3 % (ref 35–47)
HGB BLD-MCNC: 13 G/DL (ref 11.7–15.7)
HGB BLD-MCNC: 13 G/DL (ref 11.7–15.7)
IMM GRANULOCYTES # BLD: 0 10E3/UL
IMM GRANULOCYTES NFR BLD: 0 %
LYMPHOCYTES # BLD AUTO: 1.3 10E3/UL (ref 0.8–5.3)
LYMPHOCYTES NFR BLD AUTO: 36 %
MCH RBC QN AUTO: 30.9 PG (ref 26.5–33)
MCH RBC QN AUTO: 30.9 PG (ref 26.5–33)
MCHC RBC AUTO-ENTMCNC: 32.3 G/DL (ref 31.5–36.5)
MCHC RBC AUTO-ENTMCNC: 32.5 G/DL (ref 31.5–36.5)
MCV RBC AUTO: 95 FL (ref 78–100)
MCV RBC AUTO: 96 FL (ref 78–100)
MONOCYTES # BLD AUTO: 0.3 10E3/UL (ref 0–1.3)
MONOCYTES NFR BLD AUTO: 8 %
MONOCYTES NFR BLD AUTO: NEGATIVE %
NEUTROPHILS # BLD AUTO: 1.9 10E3/UL (ref 1.6–8.3)
NEUTROPHILS NFR BLD AUTO: 53 %
PLATELET # BLD AUTO: 202 10E3/UL (ref 150–450)
PLATELET # BLD AUTO: 214 10E3/UL (ref 150–450)
RBC # BLD AUTO: 4.21 10E6/UL (ref 3.8–5.2)
RBC # BLD AUTO: 4.21 10E6/UL (ref 3.8–5.2)
WBC # BLD AUTO: 3.6 10E3/UL (ref 4–11)
WBC # BLD AUTO: 3.6 10E3/UL (ref 4–11)

## 2023-05-10 PROCEDURE — 99214 OFFICE O/P EST MOD 30 MIN: CPT | Performed by: STUDENT IN AN ORGANIZED HEALTH CARE EDUCATION/TRAINING PROGRAM

## 2023-05-10 PROCEDURE — 85025 COMPLETE CBC W/AUTO DIFF WBC: CPT | Performed by: STUDENT IN AN ORGANIZED HEALTH CARE EDUCATION/TRAINING PROGRAM

## 2023-05-10 PROCEDURE — 36415 COLL VENOUS BLD VENIPUNCTURE: CPT | Performed by: STUDENT IN AN ORGANIZED HEALTH CARE EDUCATION/TRAINING PROGRAM

## 2023-05-10 PROCEDURE — 86308 HETEROPHILE ANTIBODY SCREEN: CPT | Performed by: STUDENT IN AN ORGANIZED HEALTH CARE EDUCATION/TRAINING PROGRAM

## 2023-05-10 PROCEDURE — 76536 US EXAM OF HEAD AND NECK: CPT | Mod: TC | Performed by: RADIOLOGY

## 2023-05-10 ASSESSMENT — ENCOUNTER SYMPTOMS: SORE THROAT: 1

## 2023-05-10 ASSESSMENT — PAIN SCALES - GENERAL: PAINLEVEL: NO PAIN (0)

## 2023-05-10 NOTE — PATIENT INSTRUCTIONS
[place],    Thank you for allowing [facility] to manage your care.    I ordered some blood work, please go to the laboratory to get your laboratory studies.      I ordered an ultrasound , please call diagnostic imaging [phone] to schedule your test.        For your convenience, test results are released as soon as they are available  Please allow 1-2 business days for me to send you a comment about your results.  If not done so, I encourage you to login into [email or web address] ([email or web address] to review your results in real time.     If you have any questions or concerns, please feel free to call us at [phone].    Sincerely,    Dr. Ellis    Did you know?      You can schedule a video visit for follow-up appointments as well as future appointments for certain conditions.  Please see the below link.     [email or web address]    If you have not already done so,  I encourage you to sign up for [facility] ([email or web address]).  This will allow you to review your results, securely communicate with a provider, and schedule virtual visits as well.

## 2023-05-10 NOTE — PROGRESS NOTES
Update: US shows  multinodular thyroid, no cervical adenopathy, I have placed order for FNA.  Lab shows slightly low WBC.  Assessment & Plan     1. Swollen tonsil  Exam as detailed below. Patient appears non toxic and no in distress. Vitals signs are wnl. I am not repeating strep today as she has tested negative twice since her symptom started.   DDX(peritonsilar abscess /peritonsilar cellulitis (less likely given HPI and exam finding) vs infectious etiology vs Non infectious etiology discussed with patient  - US Head Neck Soft Tissue; Future  - CBC with platelets; Future  - Mononucleosis screen; Future  - Mononucleosis screen  - CBC with platelets  Recommend a close follow up in a week or 2.    2. LAD (lymphadenopathy)    - US Head Neck Soft Tissue; Future  - CBC with platelets; Future  - Mononucleosis screen; Future  - Mononucleosis screen  - CBC with platelets    3. Neck discomfort    - US Head Neck Soft Tissue; Future  - CBC with platelets; Future  - Mononucleosis screen; Future  - Mononucleosis screen  - CBC with platelets      Edyta Ellis MD  [facility] GINGER Joseph is a 42 year old, presenting for the following health issues:  Pharyngitis      Pharyngitis     History of Present Illness       Reason for visit:  Having issues with my throat, its swollen on right side and hurts to swallow food    She eats 2-3 servings of fruits and vegetables daily.She consumes 2 sweetened beverage(s) daily.She exercises with enough effort to increase her heart rate 20 to 29 minutes per day.  She exercises with enough effort to increase her heart rate 3 or less days per week.   She is taking medications regularly.      PT states she has been to urgent care that was in April.  Was given steroids. Went away for a couple weeks. Pt has not tried anything.  It only hurts when she eats. It is not a sorethroat. It just feels bulging and uncomfortable when swallowing. No fever, chills,  difficulty breathing,night sweat  Or unwanted weight loss, no chest pain or neck pain with neck extension. No history of EBV. Patient has had tonsillar stone before but it does not feel like it this time around  Nakia Eden LPN on 5/10/2023 at 10:50 AM        Review of Systems   HENT: Positive for sore throat.       Constitutional, HEENT, cardiovascular, pulmonary, gi and gu systems are negative, except as otherwise noted.      Objective    LMP 11/20/2019 (Exact Date)   There is no height or weight on file to calculate BMI.  Physical Exam   GENERAL: healthy, alert and no distress  HENT:  without ulcers , a portion of the left tonsil and the soft palate is slightly bulging with no erythema, no uvula deviation, no drooling or stridor  NECK: thyroid normal to palpation, there is CLAD   RESP: lungs clear to auscultation - no rales, rhonchi or wheezes  CV: regular rate and rhythm, normal S1 S2, no S3 or S4, no murmur,  MS: no gross musculoskeletal defects noted, no edema

## 2023-06-02 ENCOUNTER — ANCILLARY PROCEDURE (OUTPATIENT)
Dept: ULTRASOUND IMAGING | Facility: CLINIC | Age: 43
End: 2023-06-02
Attending: STUDENT IN AN ORGANIZED HEALTH CARE EDUCATION/TRAINING PROGRAM
Payer: COMMERCIAL

## 2023-06-02 DIAGNOSIS — E04.2 MULTINODULAR THYROID: ICD-10-CM

## 2023-06-02 PROCEDURE — 10005 FNA BX W/US GDN 1ST LES: CPT | Performed by: RADIOLOGY

## 2023-06-02 PROCEDURE — 88173 CYTOPATH EVAL FNA REPORT: CPT | Mod: GC | Performed by: PATHOLOGY

## 2023-06-05 LAB
PATH REPORT.COMMENTS IMP SPEC: NORMAL
PATH REPORT.FINAL DX SPEC: NORMAL
PATH REPORT.GROSS SPEC: NORMAL
PATH REPORT.MICROSCOPIC SPEC OTHER STN: NORMAL
PATH REPORT.RELEVANT HX SPEC: NORMAL

## 2023-06-06 DIAGNOSIS — E04.2 MULTINODULAR THYROID: Primary | ICD-10-CM

## 2023-06-20 ENCOUNTER — APPOINTMENT (OUTPATIENT)
Dept: URBAN - METROPOLITAN AREA CLINIC 252 | Age: 43
Setting detail: DERMATOLOGY
End: 2023-06-22

## 2023-06-20 DIAGNOSIS — Z41.9 ENCOUNTER FOR PROCEDURE FOR PURPOSES OTHER THAN REMEDYING HEALTH STATE, UNSPECIFIED: ICD-10-CM

## 2023-06-20 PROCEDURE — OTHER BOTOX: OTHER

## 2023-06-20 NOTE — PROCEDURE: BOTOX
Consent: COLOR KEY FOR DIAGRAM BELOW:\\nRed = 1/2 unit of Jeuveau per injection\\nOrange = 1 units of Jeuveau per injection\\nYellow = 2 units of Jeuveau per injection\\nGreen = 3 units of Jeuveau per injection\\nBlue = 4 units of Jeuveau per injection\\nBrown = 5 units of Jeuveau per injection\\n\\nWritten consent obtained. Risks include but not limited to lid/brow ptosis, bruising, swelling, diplopia, temporary effect, incomplete chemical denervation.

## 2023-10-09 NOTE — TELEPHONE ENCOUNTER
RECORDS RECEIVED FROM: internal - Multinodular thyroid    DATE RECEIVED: 10/11/23   NOTES (FOR ALL VISITS) STATUS DETAILS   OFFICE NOTES from referring provider Internal Dr Edyta Ellis @ [facility]:  6/6/23 orders encounter   5/10/23   ED NOTES Internal [facility]:  4/19/23   MEDICATION LIST Internal    IMAGING      ULTRASOUND (HEAD/NECK) Internal [facility]:  US Head Neck 5/10/23   LABS     DIABETES: HBGA1C, CREATININE, FASTING LIPIDS, MICROALBUMIN URINE, POTASSIUM, TSH, T4    THYROID: TSH, T4, CBC, THYRODLONULIN, TOTAL T3, FREE T4, CALCITONIN, CEA Internal   FV:  5/10/23

## 2023-10-11 ENCOUNTER — PRE VISIT (OUTPATIENT)
Dept: ENDOCRINOLOGY | Facility: CLINIC | Age: 43
End: 2023-10-11

## 2023-10-11 ENCOUNTER — LAB (OUTPATIENT)
Dept: LAB | Facility: CLINIC | Age: 43
End: 2023-10-11
Payer: COMMERCIAL

## 2023-10-11 ENCOUNTER — OFFICE VISIT (OUTPATIENT)
Dept: ENDOCRINOLOGY | Facility: CLINIC | Age: 43
End: 2023-10-11
Payer: COMMERCIAL

## 2023-10-11 VITALS
HEIGHT: 70 IN | DIASTOLIC BLOOD PRESSURE: 98 MMHG | SYSTOLIC BLOOD PRESSURE: 139 MMHG | WEIGHT: 172 LBS | OXYGEN SATURATION: 99 % | BODY MASS INDEX: 24.62 KG/M2 | HEART RATE: 96 BPM

## 2023-10-11 DIAGNOSIS — E04.2 MULTINODULAR THYROID: ICD-10-CM

## 2023-10-11 LAB — TSH SERPL DL<=0.005 MIU/L-ACNC: 0.72 UIU/ML (ref 0.3–4.2)

## 2023-10-11 PROCEDURE — 99204 OFFICE O/P NEW MOD 45 MIN: CPT | Mod: GC | Performed by: INTERNAL MEDICINE

## 2023-10-11 PROCEDURE — 86376 MICROSOMAL ANTIBODY EACH: CPT | Performed by: INTERNAL MEDICINE

## 2023-10-11 PROCEDURE — 84443 ASSAY THYROID STIM HORMONE: CPT | Performed by: PATHOLOGY

## 2023-10-11 PROCEDURE — 99000 SPECIMEN HANDLING OFFICE-LAB: CPT | Performed by: PATHOLOGY

## 2023-10-11 PROCEDURE — 36415 COLL VENOUS BLD VENIPUNCTURE: CPT | Performed by: PATHOLOGY

## 2023-10-11 ASSESSMENT — PAIN SCALES - GENERAL: PAINLEVEL: NO PAIN (0)

## 2023-10-11 NOTE — LETTER
10/11/2023       RE: Vikki Merlos  [address] [address] [id number]     Dear Colleague,    Thank you for referring your patient, Vikki Merlos, to the [facility] [place] at [facility]. Please see a copy of my visit note below.      Endocrinology Clinic New Consult      Vikki Merlos MRN:[id number] YOB: 1980  Primary care provider: [facility]     Reason for Endocrine consult: Multinodular thyroid    HPI:  Vikki Merlos is a 43 year old female with PMHx of migraine, cervical dysplasia and anxiety she was referred to endocrinology for assessment of thyroid nodules.  She presented to an urgent care on 4/19/2023 complaining of sore throat for 1 week started on short course of Medrol.  Had follow-up with her PCP on 5/10/2023 had an ultrasound neck soft tissue done showed multiple thyroid nodules the most significant one  is the right lower thyroid nodule 1 x 0.8 x 0.6 cm solid hypoechoic with punctate echogenic foci TR 5.  On 6/2/2023 had FNA of the right lower thyroid nodule with benign cytology.  On the assessment today:  Sore throat resolved.   No neck lumps or bumps, no dysphagia dyspnea or dysphonia, no neck tightness.  No history of radiation to the neck.  Not aware of any family history of thyroid cancer.  No fatigue, hs ongoing cold intolerance, sometimes constipation , some increase in the weight recently 10 Ib over the last 9 months, has new hair loss started 6 months ago. Had hysterectomy.   Gets tremor with migraine, no palpitation , no heat intolerance, no diaphoresis, no diarrhea, no insomnia , has hx of anxiety known.            ROS:  All 12 systems were reviewed and negative except as mentioned in HPI  Answers for HPI/ROS submitted by the patient on 10/9/2023  General Symptoms: No  Skin Symptoms: No  HENT Symptoms: No  EYE SYMPTOMS: No  HEART SYMPTOMS: No  LUNG  SYMPTOMS: No  INTESTINAL SYMPTOMS: No  URINARY SYMPTOMS: No  GYNECOLOGIC SYMPTOMS: No  BREAST SYMPTOMS: No  SKELETAL SYMPTOMS: No  BLOOD SYMPTOMS: No  NERVOUS SYSTEM SYMPTOMS: No  MENTAL HEALTH SYMPTOMS: No  Past Medical/Surgical History:  Past Medical History:   Diagnosis Date    Anemia     Anxiety     Cervical dysplasia     Constipation     Migraine     Pneumonia     current cold     Past Surgical History:   Procedure Laterality Date    CONIZATION LEEP      DILATION AND CURETTAGE, HYSTEROSCOPY DIAGNOSTIC, COMBINED      DILATION AND CURETTAGE, HYSTEROSCOPY, ABLATE ENDOMETRIUM NOVASURE, COMBINED N/A 03/09/2017    Procedure: COMBINED DILATION AND CURETTAGE, HYSTEROSCOPY, ABLATE ENDOMETRIUM NOVASURE;  Surgeon: Caitlin House MD;  Location: [facility]    HEAD & NECK SURGERY      WISDOM TEETH EXTRACTED    HYSTERECTOMY, PAP NO LONGER INDICATED      LAPAROSCOPIC CYSTECTOMY OVARIAN (BENIGN) Right     1st trimester    LAPAROSCOPY DIAGNOSTIC (GYN)      WITH RIGHT SALPINGECTOMY, LAPAROSCOPY WITH JEM,        Allergies:  Allergies   Allergen Reactions    Fremanezumab Hives and Itching       PTA Meds:  Prior to Admission medications    Medication Sig Last Dose Taking? Auth Provider Long Term End Date   cyclobenzaprine (FLEXERIL) 10 MG tablet    Reported, Patient     EMGALITY 120 MG/ML injection INJECT 1 ML INTO THE MUSCLE EVERY 30 (THIRTY) DAYS  Patient not taking: Reported on 4/19/2023   Reported, Patient     methylPREDNISolone (MEDROL DOSEPAK) 4 MG tablet therapy pack Follow Package Directions  Patient not taking: Reported on 5/10/2023   Preeti Lee PA-C     Naproxen Sodium (ALEVE PO) Take 220 mg by mouth daily as needed for moderate pain   Patient not taking: Reported on 10/2/2022   Reported, Patient     rimegepant (NURTEC) 75 MG ODT tablet Take 1 tab at onset of migraine. Max of 1 tab per 24 hours.  Patient not taking: Reported on 4/19/2023   Reported, Patient     rizatriptan (MAXALT-MLT) 10 MG ODT START AT ONSET OF  MIGRAINES. MAY REPEAT AFTER TWO HOURS. MAXIMUM DOSE 30MG/24 HOURS.  Patient not taking: Reported on 4/19/2023   Reported, Patient     scopolamine (TRANSDERM) 1 MG/3DAYS 72 hr patch Place 1 patch onto the skin every 72 hours (apply 1 patch every 3 days prn; Start: >4h before event; Info: do not cut patch)  Patient not taking: Reported on 4/19/2023   Edyta Ellis MD     SUMAtriptan (IMITREX) 100 MG tablet TAKE 1 TABLET BY MOUTH AS DIRECTED. MAY REPEAT AFTER TWO HOURS. MAXIMUM DOSE 200 MG/24 HOURS.   Reported, Patient No    terbinafine (LAMISIL) 250 MG tablet Take 1 tablet (250 mg) by mouth daily  Patient not taking: Reported on 10/2/2022   Darnell Colon DPM     tiZANidine (ZANAFLEX) 4 MG tablet TAKE 1/2-1 TAB AT NIGHTY AS NEEDED FOR NECK PAIN  Patient not taking: Reported on 4/19/2023   Reported, Patient     TRAMADOL HCL PO Take  mg by mouth every 6 hours as needed for moderate to severe pain    Reported, Patient          Current heard:   Current Outpatient Medications   Medication    cyclobenzaprine (FLEXERIL) 10 MG tablet    EMGALITY 120 MG/ML injection    methylPREDNISolone (MEDROL DOSEPAK) 4 MG tablet therapy pack    Naproxen Sodium (ALEVE PO)    rimegepant (NURTEC) 75 MG ODT tablet    rizatriptan (MAXALT-MLT) 10 MG ODT    scopolamine (TRANSDERM) 1 MG/3DAYS 72 hr patch    SUMAtriptan (IMITREX) 100 MG tablet    terbinafine (LAMISIL) 250 MG tablet    tiZANidine (ZANAFLEX) 4 MG tablet    TRAMADOL HCL PO     No current facility-administered medications for this visit.       Family History:  No family history on file.  Maternal Aunt : Hashimoto thyroiditis   Maternal cousin: hypothyroidism      Social History:  Social History     Tobacco Use    Smoking status: Never    Smokeless tobacco: Never   Substance Use Topics    Alcohol use: No         Physical examination:  General appearance: seated comfortably in the chair during assessment. Not in any acute distress  HEENT: No thyromegaly, no palpable  thyroid nodules , no Cervical LN    Lungs: bilateral air entry equal. Clear to auscultation. No rhonchi or crepitations heard  Heart: S1 S2 normal. Pulse: regular rate and rhythm, good volume  Abdomen: soft, nontender, nondistended  Neurological: conscious and oriented. Speech: normal. Moving all four extremities equally  Extremities: no edema noted. . No tremor is noted over her outstretched hands  Psychiatric: normal mood and affect. Normal judgment    Endocrine Labs:   Latest Reference Range & Units Most Recent   TSH 0.450 - 4.500 ulu/ml 1.070 (E)  2/15/18 00:00   Pathology report of FNA on 6/2/2023:  Final Diagnosis   Specimen A                 Interpretation:                  Benign  Consistent with a benign nodule (includes adenomatoid nodule, colloid nodule, etc.)     The [place] implied risk of malignancy and recommended clinical management:  Benign has a 0-3% risk of malignancy, recommended management is clinical follow-up.                  Adequacy:                 Satisfactory for evaluation       Ultrasound neck soft tissue on 5/10/2023:    FINDINGS:  RIGHT lobe: 6.1 x 1.6 x 1.7 cm. Heterogeneous with multiple nodules.  Isthmus: 3 mm. Single nodule.  LEFT lobe: 5.8 x 1.6 x 1.0 cm. Heterogeneous with multiple nodules.     NECK: No cervical lymphadenopathy.     NODULES:     Nodule 1: Lower right thyroid lobe nodule measuring 1.6 x 0.9 x 1.1  cm.   Composition: Spongiform, 0 points   Echogenicity: Hypoechoic, 2 points   Shape: Wider-than-tall, 0 points   Margin: Smooth, 0 points   Echogenic Foci: None, or large comet-tail artifacts, 0 points   Point Total: 1-2 points. TI-RADS 2. No FNA.      Nodule 2: Lower right thyroid lobe nodule measuring 1.0 x 0.8 x 0.6 cm  Composition: Solid or almost completely solid, 2 points   Echogenicity: Hypoechoic, 2 points   Shape: Wider-than-tall, 0 points   Margin: Smooth, 0 points   Echogenic Foci: Punctate echoic foci, 3 points   Point Total: 7 points or more. TI-RADS 5.  If 1 cm or larger, recommend  FNA; if 0.5 cm or larger, follow up US annually for 5 years.     Nodule 3: Right isthmus nodule measuring 1.1 x 0.8 x 0.4 cm  Composition: Solid or almost completely solid, 2 points   Echogenicity: Hyperechoic or isoechoic, 1 point   Shape: Wider-than-tall, 0 points   Margin: Smooth, 0 points   Echogenic Foci: None, or large comet-tail artifacts, 0 points   Point Total: 3 points. TI-RADS 3. If 2.5 cm or larger, recommend FNA;  if 1.5 cm or larger, recommend follow up US at 1, 3, and 5 years.     Nodule 4: Lower left thyroid lobe nodule measuring 0.7 x 0.7 x 0.6 cm  Composition: Solid or almost completely solid, 2 points   Echogenicity: Hypoechoic, 2 points   Shape: Not taller than wide, 0 points   Margin: Smooth, 0 points   Echogenic Foci: None, or large comet-tail artifacts, 0 points   Point Total: 4-6 points. TI-RADS 4. If 1.5 cm or larger, recommend  FNA; if 1 cm or larger, follow up US (annually for 5 years).                                                                      IMPRESSION:  1.  Multinodular thyroid with a 1.0 cm lower right thyroid lobe  TI-RADS 5 nodule with punctate echogenic foci. FNA is recommended.  2.  No cervical adenopathy.        Assessment and Plan:   Vikki Merlos is a 43 year old female with a history of asthma, endometriosis and migraine was referred for assessment of thyroid nodules.  She was found to have multiple thyroid nodules on the ultrasound on 5/10/2023.  The concerning nodule was in the right lower thyroid nodule 1 cm solid hypoechoic with punctate echoic foci FNA) with benign results.    #Thyroid nodules:  Right lower thyroid nodule FNA with benign results.  Other nodules does not meet the criteria for FNA by PIERCE or ACR.  TSH level was not checked.    Plan:  -TSH check.  -RTC in 1 year with thyroid ultrasound.      The patient was seen, examined and discussed with MD Lana Tamayo [facility]     Endocrinology diabetes and metabolism   fellow   Pager number: [phone]    Attending tie-in note  I saw the patient with endocrine fellow Dr. Nair and directly examined patient and discussed. Agree above note and plan.       Lalitha Torres MD      45 minutes spent on the date of the encounter doing chart review, history and exam, documentation and further activities as noted above.    Lalitha Torres MD  Staff Physician  Endocrinology and Metabolism  [facility]  License: [id number]  Pager: [phone]

## 2023-10-11 NOTE — PROGRESS NOTES
Endocrinology Clinic New Consult      Vikki Merlos MRN:[id number] YOB: 1980  Primary care provider: Ashley - GI[facility]     Reason for Endocrine consult: Multinodular thyroid    HPI:  Vikki Merlos is a 43 year old female with PMHx of migraine, cervical dysplasia and anxiety she was referred to endocrinology for assessment of thyroid nodules.  She presented to an urgent care on 4/19/2023 complaining of sore throat for 1 week started on short course of Medrol.  Had follow-up with her PCP on 5/10/2023 had an ultrasound neck soft tissue done showed multiple thyroid nodules the most significant one  is the right lower thyroid nodule 1 x 0.8 x 0.6 cm solid hypoechoic with punctate echogenic foci TR 5.  On 6/2/2023 had FNA of the right lower thyroid nodule with benign cytology.  On the assessment today:  Sore throat resolved.   No neck lumps or bumps, no dysphagia dyspnea or dysphonia, no neck tightness.  No history of radiation to the neck.  Not aware of any family history of thyroid cancer.  No fatigue, hs ongoing cold intolerance, sometimes constipation , some increase in the weight recently 10 Ib over the last 9 months, has new hair loss started 6 months ago. Had hysterectomy.   Gets tremor with migraine, no palpitation , no heat intolerance, no diaphoresis, no diarrhea, no insomnia , has hx of anxiety known.            ROS:  All 12 systems were reviewed and negative except as mentioned in HPI  Answers for HPI/ROS submitted by the patient on 10/9/2023  General Symptoms: No  Skin Symptoms: No  HENT Symptoms: No  EYE SYMPTOMS: No  HEART SYMPTOMS: No  LUNG SYMPTOMS: No  INTESTINAL SYMPTOMS: No  URINARY SYMPTOMS: No  GYNECOLOGIC SYMPTOMS: No  BREAST SYMPTOMS: No  SKELETAL SYMPTOMS: No  BLOOD SYMPTOMS: No  NERVOUS SYSTEM SYMPTOMS: No  MENTAL HEALTH SYMPTOMS: No  Past Medical/Surgical History:  Past Medical History:   Diagnosis Date    Anemia     Anxiety     Cervical dysplasia      Constipation     Migraine     Pneumonia     current cold     Past Surgical History:   Procedure Laterality Date    CONIZATION LEEP      DILATION AND CURETTAGE, HYSTEROSCOPY DIAGNOSTIC, COMBINED      DILATION AND CURETTAGE, HYSTEROSCOPY, ABLATE ENDOMETRIUM NOVASURE, COMBINED N/A 03/09/2017    Procedure: COMBINED DILATION AND CURETTAGE, HYSTEROSCOPY, ABLATE ENDOMETRIUM NOVASURE;  Surgeon: Caitlin House MD;  Location: [facility]    HEAD & NECK SURGERY      WISDOM TEETH EXTRACTED    HYSTERECTOMY, PAP NO LONGER INDICATED      LAPAROSCOPIC CYSTECTOMY OVARIAN (BENIGN) Right     1st trimester    LAPAROSCOPY DIAGNOSTIC (GYN)      WITH RIGHT SALPINGECTOMY, LAPAROSCOPY WITH JEM,        Allergies:  Allergies   Allergen Reactions    Fremanezumab Hives and Itching       PTA Meds:  Prior to Admission medications    Medication Sig Last Dose Taking? Auth Provider Long Term End Date   cyclobenzaprine (FLEXERIL) 10 MG tablet    Reported, Patient     EMGALITY 120 MG/ML injection INJECT 1 ML INTO THE MUSCLE EVERY 30 (THIRTY) DAYS  Patient not taking: Reported on 4/19/2023   Reported, Patient     methylPREDNISolone (MEDROL DOSEPAK) 4 MG tablet therapy pack Follow Package Directions  Patient not taking: Reported on 5/10/2023   Preeti Lee PA-C     Naproxen Sodium (ALEVE PO) Take 220 mg by mouth daily as needed for moderate pain   Patient not taking: Reported on 10/2/2022   Reported, Patient     rimegepant (NURTEC) 75 MG ODT tablet Take 1 tab at onset of migraine. Max of 1 tab per 24 hours.  Patient not taking: Reported on 4/19/2023   Reported, Patient     rizatriptan (MAXALT-MLT) 10 MG ODT START AT ONSET OF MIGRAINES. MAY REPEAT AFTER TWO HOURS. MAXIMUM DOSE 30MG/24 HOURS.  Patient not taking: Reported on 4/19/2023   Reported, Patient     scopolamine (TRANSDERM) 1 MG/3DAYS 72 hr patch Place 1 patch onto the skin every 72 hours (apply 1 patch every 3 days prn; Start: >4h before event; Info: do not cut patch)  Patient not taking:  Reported on 4/19/2023   Edyta Ellis MD     SUMAtriptan (IMITREX) 100 MG tablet TAKE 1 TABLET BY MOUTH AS DIRECTED. MAY REPEAT AFTER TWO HOURS. MAXIMUM DOSE 200 MG/24 HOURS.   Reported, Patient No    terbinafine (LAMISIL) 250 MG tablet Take 1 tablet (250 mg) by mouth daily  Patient not taking: Reported on 10/2/2022   Darnell Colon DPM     tiZANidine (ZANAFLEX) 4 MG tablet TAKE 1/2-1 TAB AT NIGHTY AS NEEDED FOR NECK PAIN  Patient not taking: Reported on 4/19/2023   Reported, Patient     TRAMADOL HCL PO Take  mg by mouth every 6 hours as needed for moderate to severe pain    Reported, Patient          Current heard:   Current Outpatient Medications   Medication    cyclobenzaprine (FLEXERIL) 10 MG tablet    EMGALITY 120 MG/ML injection    methylPREDNISolone (MEDROL DOSEPAK) 4 MG tablet therapy pack    Naproxen Sodium (ALEVE PO)    rimegepant (NURTEC) 75 MG ODT tablet    rizatriptan (MAXALT-MLT) 10 MG ODT    scopolamine (TRANSDERM) 1 MG/3DAYS 72 hr patch    SUMAtriptan (IMITREX) 100 MG tablet    terbinafine (LAMISIL) 250 MG tablet    tiZANidine (ZANAFLEX) 4 MG tablet    TRAMADOL HCL PO     No current facility-administered medications for this visit.       Family History:  No family history on file.  Maternal Aunt : Hashimoto thyroiditis   Maternal cousin: hypothyroidism      Social History:  Social History     Tobacco Use    Smoking status: Never    Smokeless tobacco: Never   Substance Use Topics    Alcohol use: No         Physical examination:  General appearance: seated comfortably in the chair during assessment. Not in any acute distress  HEENT: No thyromegaly, no palpable thyroid nodules , no Cervical LN    Lungs: bilateral air entry equal. Clear to auscultation. No rhonchi or crepitations heard  Heart: S1 S2 normal. Pulse: regular rate and rhythm, good volume  Abdomen: soft, nontender, nondistended  Neurological: conscious and oriented. Speech: normal. Moving all four extremities  equally  Extremities: no edema noted. . No tremor is noted over her outstretched hands  Psychiatric: normal mood and affect. Normal judgment    Endocrine Labs:   Latest Reference Range & Units Most Recent   TSH 0.450 - 4.500 ulu/ml 1.070 (E)  2/15/18 00:00   Pathology report of FNA on 6/2/2023:  Final Diagnosis   Specimen A                 Interpretation:                  Benign  Consistent with a benign nodule (includes adenomatoid nodule, colloid nodule, etc.)     The [place] implied risk of malignancy and recommended clinical management:  Benign has a 0-3% risk of malignancy, recommended management is clinical follow-up.                  Adequacy:                 Satisfactory for evaluation       Ultrasound neck soft tissue on 5/10/2023:    FINDINGS:  RIGHT lobe: 6.1 x 1.6 x 1.7 cm. Heterogeneous with multiple nodules.  Isthmus: 3 mm. Single nodule.  LEFT lobe: 5.8 x 1.6 x 1.0 cm. Heterogeneous with multiple nodules.     NECK: No cervical lymphadenopathy.     NODULES:     Nodule 1: Lower right thyroid lobe nodule measuring 1.6 x 0.9 x 1.1  cm.   Composition: Spongiform, 0 points   Echogenicity: Hypoechoic, 2 points   Shape: Wider-than-tall, 0 points   Margin: Smooth, 0 points   Echogenic Foci: None, or large comet-tail artifacts, 0 points   Point Total: 1-2 points. TI-RADS 2. No FNA.      Nodule 2: Lower right thyroid lobe nodule measuring 1.0 x 0.8 x 0.6 cm  Composition: Solid or almost completely solid, 2 points   Echogenicity: Hypoechoic, 2 points   Shape: Wider-than-tall, 0 points   Margin: Smooth, 0 points   Echogenic Foci: Punctate echoic foci, 3 points   Point Total: 7 points or more. TI-RADS 5. If 1 cm or larger, recommend  FNA; if 0.5 cm or larger, follow up US annually for 5 years.     Nodule 3: Right isthmus nodule measuring 1.1 x 0.8 x 0.4 cm  Composition: Solid or almost completely solid, 2 points   Echogenicity: Hyperechoic or isoechoic, 1 point   Shape: Wider-than-tall, 0 points   Margin: Smooth,  0 points   Echogenic Foci: None, or large comet-tail artifacts, 0 points   Point Total: 3 points. TI-RADS 3. If 2.5 cm or larger, recommend FNA;  if 1.5 cm or larger, recommend follow up US at 1, 3, and 5 years.     Nodule 4: Lower left thyroid lobe nodule measuring 0.7 x 0.7 x 0.6 cm  Composition: Solid or almost completely solid, 2 points   Echogenicity: Hypoechoic, 2 points   Shape: Not taller than wide, 0 points   Margin: Smooth, 0 points   Echogenic Foci: None, or large comet-tail artifacts, 0 points   Point Total: 4-6 points. TI-RADS 4. If 1.5 cm or larger, recommend  FNA; if 1 cm or larger, follow up US (annually for 5 years).                                                                      IMPRESSION:  1.  Multinodular thyroid with a 1.0 cm lower right thyroid lobe  TI-RADS 5 nodule with punctate echogenic foci. FNA is recommended.  2.  No cervical adenopathy.        Assessment and Plan:   Vikki Merlos is a 43 year old female with a history of asthma, endometriosis and migraine was referred for assessment of thyroid nodules.  She was found to have multiple thyroid nodules on the ultrasound on 5/10/2023.  The concerning nodule was in the right lower thyroid nodule 1 cm solid hypoechoic with punctate echoic foci FNA) with benign results.    #Thyroid nodules:  Right lower thyroid nodule FNA with benign results.  Other nodules does not meet the criteria for FNA by PIERCE or ACR.  TSH level was not checked.    Plan:  -TSH check.  -RTC in 1 year with thyroid ultrasound.      The patient was seen, examined and discussed with MD Lana Tamayo     Endocrinology diabetes and metabolism  fellow   Pager number: [phone]    Attending tie-in note  I saw the patient with endocrine fellow Dr. Nair and directly examined patient and discussed. Agree above note and plan.       Lalitha Torres MD      45 minutes spent on the date of the encounter doing chart review, history and exam, documentation and further  activities as noted above.    Lalitha Torres MD  Staff Physician  Endocrinology and Metabolism  [facility]  License: [id number]  Pager: [phone]

## 2023-10-11 NOTE — PATIENT INSTRUCTIONS
- will get the level of the thyroid hormone test today.   - We will see you back in 1 year to repeat the ultrasound

## 2023-10-12 LAB — THYROPEROXIDASE AB SERPL-ACNC: <10 IU/ML

## 2023-10-31 NOTE — TELEPHONE ENCOUNTER
Insurance will only allow 15 more days to be filled, and then a prior authorization will be required.    Prior Authorization Retail Medication Request    Medication/Dose: Itraconazole 100mg Capsules  ICD code (if different than what is on RX):  B351  Previously Tried and Failed:    Rationale:      Insurance Name:  [employer]  Insurance ID:  [id number]      Pharmacy Information (if different than what is on RX)  Name:  [facility]  Phone:  [phone]   Left voicemail for pt to call Endoscopy Scheduling to confirm Colonoscopy on 11/3/23.

## 2023-12-05 ENCOUNTER — APPOINTMENT (OUTPATIENT)
Dept: URBAN - METROPOLITAN AREA CLINIC 252 | Age: 43
Setting detail: DERMATOLOGY
End: 2023-12-06

## 2023-12-05 DIAGNOSIS — Z41.9 ENCOUNTER FOR PROCEDURE FOR PURPOSES OTHER THAN REMEDYING HEALTH STATE, UNSPECIFIED: ICD-10-CM

## 2023-12-05 PROCEDURE — OTHER BOTOX: OTHER

## 2023-12-05 NOTE — PROCEDURE: BOTOX
Expiration Date (Month Year): 2025/09
Detail Level: Detailed
Post-Care Instructions: Patient instructed to not lie down for 4 hours and limit physical activity for 24 hours.
Map Statement: Please see attached map for locations and injection amounts.
Consent: COLOR KEY FOR DIAGRAM BELOW:\\nRed = 1/2 unit of Jeuveau per injection\\nOrange = 1 units of Jeuveau per injection\\nYellow = 2 units of Jeuveau per injection\\nGreen = 3 units of Jeuveau per injection\\nBlue = 4 units of Jeuveau per injection\\nBrown = 5 units of Jeuveau per injection\\n\\nWritten consent obtained. Risks include but not limited to lid/brow ptosis, bruising, swelling, diplopia, temporary effect, incomplete chemical denervation.
Total Units: 28
Lot #: [id number]
Show Inventory Tab: Show
Price (Use Numbers Only, No Special Characters Or $): 472

## 2024-03-21 ENCOUNTER — ANCILLARY PROCEDURE (OUTPATIENT)
Dept: MAMMOGRAPHY | Facility: CLINIC | Age: 44
End: 2024-03-21
Attending: STUDENT IN AN ORGANIZED HEALTH CARE EDUCATION/TRAINING PROGRAM
Payer: COMMERCIAL

## 2024-03-21 DIAGNOSIS — Z12.31 VISIT FOR SCREENING MAMMOGRAM: ICD-10-CM

## 2024-03-21 PROCEDURE — 77067 SCR MAMMO BI INCL CAD: CPT | Mod: TC | Performed by: RADIOLOGY

## 2024-03-21 PROCEDURE — 77063 BREAST TOMOSYNTHESIS BI: CPT | Mod: TC | Performed by: RADIOLOGY

## 2024-04-29 ENCOUNTER — APPOINTMENT (OUTPATIENT)
Dept: URBAN - METROPOLITAN AREA CLINIC 252 | Age: 44
Setting detail: DERMATOLOGY
End: 2024-04-29

## 2024-04-29 DIAGNOSIS — Z41.9 ENCOUNTER FOR PROCEDURE FOR PURPOSES OTHER THAN REMEDYING HEALTH STATE, UNSPECIFIED: ICD-10-CM

## 2024-04-29 PROCEDURE — OTHER BOTOX: OTHER

## 2024-04-29 NOTE — PROCEDURE: BOTOX
Price (Use Numbers Only, No Special Characters Or $): 151
Total Units: 29
Post-Care Instructions: Patient instructed to not lie down for 4 hours and limit physical activity for 24 hours.
Map Statement: Please see attached map for locations and injection amounts.
Show Inventory Tab: Show
Detail Level: Detailed
Consent: COLOR KEY FOR DIAGRAM BELOW:\\nRed = 1/2 unit of Jeuveau per injection\\nOrange = 1 units of Jeuveau per injection\\nYellow = 2 units of Jeuveau per injection\\nGreen = 3 units of Jeuveau per injection\\nBlue = 4 units of Jeuveau per injection\\nBrown = 5 units of Jeuveau per injection\\n\\nWritten consent obtained. Risks include but not limited to lid/brow ptosis, bruising, swelling, diplopia, temporary effect, incomplete chemical denervation.
Lot #: [id number]
Expiration Date (Month Year): 2025/09

## 2024-06-17 ENCOUNTER — OFFICE VISIT (OUTPATIENT)
Dept: URGENT CARE | Facility: URGENT CARE | Age: 44
End: 2024-06-17
Payer: COMMERCIAL

## 2024-06-17 VITALS
WEIGHT: 176.4 LBS | BODY MASS INDEX: 25.31 KG/M2 | OXYGEN SATURATION: 100 % | DIASTOLIC BLOOD PRESSURE: 85 MMHG | RESPIRATION RATE: 12 BRPM | SYSTOLIC BLOOD PRESSURE: 146 MMHG | TEMPERATURE: 98.1 F | HEART RATE: 142 BPM

## 2024-06-17 DIAGNOSIS — B02.9 ACUTE PAIN ASSOCIATED WITH HERPES ZOSTER: Primary | ICD-10-CM

## 2024-06-17 PROCEDURE — 99214 OFFICE O/P EST MOD 30 MIN: CPT | Performed by: PHYSICIAN ASSISTANT

## 2024-06-17 RX ORDER — LIDOCAINE 50 MG/G
OINTMENT TOPICAL PRN
Qty: 30 G | Refills: 0 | Status: SHIPPED | OUTPATIENT
Start: 2024-06-17 | End: 2024-09-10

## 2024-06-17 RX ORDER — LIDOCAINE 50 MG/G
OINTMENT TOPICAL ONCE
Status: DISCONTINUED | OUTPATIENT
Start: 2024-06-17 | End: 2024-06-17

## 2024-06-17 RX ORDER — GABAPENTIN 100 MG/1
100 CAPSULE ORAL 3 TIMES DAILY
Qty: 50 CAPSULE | Refills: 0 | Status: SHIPPED | OUTPATIENT
Start: 2024-06-17 | End: 2024-06-25

## 2024-06-17 NOTE — PROGRESS NOTES
SUBJECTIVE:   Vikki Merlos is a 43 year old female presenting with a chief complaint of   Chief Complaint   Patient presents with    Shingles     Diagnosed with shingles on 6/7. Took antiviral for 7 days. Feels a burning down the left side of her back after finishing medication       She is an established patient of [facility].  Patient presents with left side burning sensation after being diagnosed with shingles.  Finished antiviral.  She had the rash for 3 days prior to starting antiviral.      Treatment:  tramadol and vicodin.  Ice packs and cool clothes.        Review of Systems    Past Medical History:   Diagnosis Date    Anemia     Anxiety     Cervical dysplasia     Constipation     Migraine     Pneumonia     current cold     History reviewed. No pertinent family history.  Current Outpatient Medications   Medication Sig Dispense Refill    cyclobenzaprine (FLEXERIL) 10 MG tablet       gabapentin (NEURONTIN) 100 MG capsule Take 1 capsule (100 mg) by mouth 3 times daily 50 capsule 0    lidocaine (XYLOCAINE) 5 % external ointment Apply topically as needed for moderate pain 30 g 0    SUMAtriptan (IMITREX) 100 MG tablet TAKE 1 TABLET BY MOUTH AS DIRECTED. MAY REPEAT AFTER TWO HOURS. MAXIMUM DOSE 200 MG/24 HOURS.      TRAMADOL HCL PO Take  mg by mouth every 6 hours as needed for moderate to severe pain       EMGALITY 120 MG/ML injection INJECT 1 ML INTO THE MUSCLE EVERY 30 (THIRTY) DAYS (Patient not taking: Reported on 4/19/2023)      methylPREDNISolone (MEDROL DOSEPAK) 4 MG tablet therapy pack Follow Package Directions (Patient not taking: Reported on 5/10/2023) 21 tablet 0    Naproxen Sodium (ALEVE PO) Take 220 mg by mouth daily as needed for moderate pain  (Patient not taking: Reported on 10/2/2022)      rimegepant (NURTEC) 75 MG ODT tablet Take 1 tab at onset of migraine. Max of 1 tab per 24 hours. (Patient not taking: Reported on 4/19/2023)      rizatriptan (MAXALT-MLT) 10 MG ODT START AT ONSET OF  MIGRAINES. MAY REPEAT AFTER TWO HOURS. MAXIMUM DOSE 30MG/24 HOURS. (Patient not taking: Reported on 4/19/2023)      scopolamine (TRANSDERM) 1 MG/3DAYS 72 hr patch Place 1 patch onto the skin every 72 hours (apply 1 patch every 3 days prn; Start: >4h before event; Info: do not cut patch) (Patient not taking: Reported on 4/19/2023) 10 patch 1    terbinafine (LAMISIL) 250 MG tablet Take 1 tablet (250 mg) by mouth daily (Patient not taking: Reported on 10/2/2022) 30 tablet 2    tiZANidine (ZANAFLEX) 4 MG tablet TAKE 1/2-1 TAB AT NIGHTY AS NEEDED FOR NECK PAIN (Patient not taking: Reported on 4/19/2023)       Social History     Tobacco Use    Smoking status: Never    Smokeless tobacco: Never   Substance Use Topics    Alcohol use: No       OBJECTIVE  BP (!) 146/85   Pulse (!) 142   Temp 98.1  F (36.7  C) (Oral)   Resp 12   Wt 80 kg (176 lb 6.4 oz)   LMP 11/20/2019 (Exact Date)   SpO2 100%   BMI 25.31 kg/m      Physical Exam  Vitals reviewed.   Constitutional:       General: She is not in acute distress.     Appearance: Normal appearance. She is obese. She is not ill-appearing.   Cardiovascular:      Rate and Rhythm: Tachycardia present.   Skin:     Comments: Left posterior neck into right anterior upper arm, scabs, mostly healed.     Neurological:      Mental Status: She is alert.         Labs:  No results found for this or any previous visit (from the past 24 hour(s)).        ASSESSMENT:      ICD-10-CM    1. Acute pain associated with herpes zoster  B02.9 gabapentin (NEURONTIN) 100 MG capsule     lidocaine (XYLOCAINE) 5 % external ointment     DISCONTINUED: lidocaine (XYLOCAINE) 5 % ointment           Medical Decision Making:    Differential Diagnosis:  Post herpetic neuralgia, shingles    Serious Comorbid Conditions:  Adult:   reviewed    PLAN:    Rx for lidocaine ointment - discussed appropriate use.  Rx for neurontin with graduated dosing q 3 days.  Highly recommended seeing PCP for management.  Discussed 800  mg ibuproofen with 1 g tylenol TID with food.  May use calamine lotion, ice, etc for relief.      Followup:    If not improving or if condition worsens, follow up with your Primary Care Provider, If not improving or if conditions worsens over the next 12-24 hours, go to the Emergency Department    There are no Patient Instructions on file for this visit.

## 2024-06-19 ENCOUNTER — OFFICE VISIT (OUTPATIENT)
Dept: FAMILY MEDICINE | Facility: CLINIC | Age: 44
End: 2024-06-19
Payer: COMMERCIAL

## 2024-06-19 VITALS
DIASTOLIC BLOOD PRESSURE: 78 MMHG | OXYGEN SATURATION: 100 % | SYSTOLIC BLOOD PRESSURE: 138 MMHG | TEMPERATURE: 98.2 F | HEIGHT: 70 IN | BODY MASS INDEX: 24.91 KG/M2 | RESPIRATION RATE: 16 BRPM | HEART RATE: 92 BPM | WEIGHT: 174 LBS

## 2024-06-19 DIAGNOSIS — B02.29 POST HERPETIC NEURALGIA: ICD-10-CM

## 2024-06-19 DIAGNOSIS — B02.8 HERPES ZOSTER WITH OTHER COMPLICATION: Primary | ICD-10-CM

## 2024-06-19 PROCEDURE — 99214 OFFICE O/P EST MOD 30 MIN: CPT | Performed by: FAMILY MEDICINE

## 2024-06-19 RX ORDER — METHYLPREDNISOLONE 4 MG
TABLET, DOSE PACK ORAL
Qty: 21 TABLET | Refills: 0 | Status: SHIPPED | OUTPATIENT
Start: 2024-06-19 | End: 2024-07-16

## 2024-06-19 RX ORDER — CAPSAICIN 0.025 %
CREAM (GRAM) TOPICAL
Qty: 118 G | Refills: 0 | Status: SHIPPED | OUTPATIENT
Start: 2024-06-19 | End: 2024-07-16

## 2024-06-19 RX ORDER — PREGABALIN 25 MG/1
25 CAPSULE ORAL 3 TIMES DAILY
Qty: 120 CAPSULE | Refills: 0 | Status: SHIPPED | OUTPATIENT
Start: 2024-06-19 | End: 2024-07-16

## 2024-06-19 ASSESSMENT — PAIN SCALES - GENERAL: PAINLEVEL: SEVERE PAIN (7)

## 2024-06-19 NOTE — PROGRESS NOTES
"  1. Herpes zoster with other complication  New patient to this provider who was diagnosed and treated with shingles about 2 weeks ago.  Had antiviral and narcotics and recently went to urgent care and was given gabapentin and she is not tolerating it.  She is dizzy and does not want to take it well healed shingles outbreak now, no sign of infection  - capsaicin (ZOSTRIX) 0.025 % external cream; Pea size TID PRN  Dispense: 118 g; Refill: 0    2. Post herpetic neuralgia  Discussed potentially switching to lyrica, from gabapentin.  Add capsaicin topical, consider prednisone if not resolving.   Follow up with pcp in 1-2 weeks  - pregabalin (LYRICA) 25 MG capsule; Take 1 capsule (25 mg) by mouth 3 times daily for 60 days  Dispense: 120 capsule; Refill: 0  - methylPREDNISolone (MEDROL DOSEPAK) 4 MG tablet therapy pack; Follow Package Directions  Dispense: 21 tablet; Refill: 0  - capsaicin (ZOSTRIX) 0.025 % external cream; Pea size TID PRN  Dispense: 118 g; Refill: 0      Kristofer Joseph is a 43 year old, presenting for the following health issues:  Shingles and Follow Up (Seen at urgent care)        6/19/2024    10:21 AM   Additional Questions   Roomed by elham CHRISTIANSON     Going from neck to arm right now.   Reports 7/10 pain currently  At night pain is a 10/10  She will not take the gabapentin and wants alternative suggestions for pain control      ED/UC Followup:    Facility:  [facility]  Date of visit: 6/17/24  Reason for visit: shingles  Current Status: painful shingles rash     Diagnosed 6/7/24 with shingles, neck to arm, 7 days of antiviral, healed over now  Burning pain  Night time        Review of Systems  Constitutional, HEENT, cardiovascular, pulmonary, GI, , musculoskeletal, neuro, skin, endocrine and psych systems are negative, except as otherwise noted.      Objective    /78   Pulse 92   Temp 98.2  F (36.8  C) (Oral)   Resp 16   Ht 1.778 m (5' 10\")   Wt 78.9 kg (174 lb)   LMP " 11/20/2019 (Exact Date)   SpO2 100%   BMI 24.97 kg/m    Body mass index is 24.97 kg/m .  Physical Exam   GENERAL: alert and no distress  NECK: no adenopathy, no asymmetry, masses, or scars  RESP: lungs clear to auscultation - no rales, rhonchi or wheezes  CV: regular rate and rhythm, normal S1 S2, no S3 or S4, no murmur, click or rub, no peripheral edema  ABDOMEN: soft, nontender, no hepatosplenomegaly, no masses and bowel sounds normal  MS: no gross musculoskeletal defects noted, no edema      Signed Electronically by: Hanna Mckeon DO

## 2024-06-19 NOTE — PATIENT INSTRUCTIONS
Switch to lyrica  Prednisone if not improving  Monitor lesions  Follow up with pcp in 1-2 weeks as advised  Take care  Hanna Mckeon D.O.        Patient Education

## 2024-06-25 ENCOUNTER — VIRTUAL VISIT (OUTPATIENT)
Dept: FAMILY MEDICINE | Facility: CLINIC | Age: 44
End: 2024-06-25
Payer: COMMERCIAL

## 2024-06-25 DIAGNOSIS — K64.5 THROMBOSED EXTERNAL HEMORRHOIDS: Primary | ICD-10-CM

## 2024-06-25 DIAGNOSIS — B02.29 POST HERPETIC NEURALGIA: ICD-10-CM

## 2024-06-25 PROCEDURE — 99213 OFFICE O/P EST LOW 20 MIN: CPT | Mod: 95 | Performed by: STUDENT IN AN ORGANIZED HEALTH CARE EDUCATION/TRAINING PROGRAM

## 2024-06-25 RX ORDER — FAMCICLOVIR 500 MG/1
TABLET ORAL
COMMUNITY
Start: 2024-06-07 | End: 2024-07-16

## 2024-06-25 RX ORDER — ATOGEPANT 60 MG/1
TABLET ORAL
COMMUNITY
Start: 2023-05-17 | End: 2024-07-16

## 2024-06-25 RX ORDER — ZONISAMIDE 25 MG/1
CAPSULE ORAL
COMMUNITY
Start: 2023-05-02 | End: 2024-07-16

## 2024-06-25 RX ORDER — HYDROXYZINE HYDROCHLORIDE 25 MG/1
1 TABLET, FILM COATED ORAL
COMMUNITY
Start: 2024-01-10 | End: 2024-07-16

## 2024-06-25 NOTE — PATIENT INSTRUCTIONS
[place],    Thank you for allowing [facility] to manage your care.        I made a referral, they will be calling in approximately 1 week to set up your appointment.  If you do not hear from them, please call the specialty number on your after visit.     For your convenience, test results are released as soon as they are available  Please allow 1-2 business days for me to send you a comment about your results.  If not done so, I encourage you to login into [employer] [email or web address] to review your results in real time.     If you have any questions or concerns, please feel free to call us at [phone].    Sincerely,    Dr. Ellis    Did you know?      You can schedule a video visit for follow-up appointments as well as future appointments for certain conditions.  Please see the below link.     [email or web address]    If you have not already done so,  I encourage you to sign up for [employer] ([email or web address]).  This will allow you to review your results, securely communicate with a provider, and schedule virtual visits as well.

## 2024-06-25 NOTE — PROGRESS NOTES
Opal is a 43 year old who is being evaluated via a billable video visit.    How would you like to obtain your AVS? [employer]  If the video visit is dropped, the invitation should be resent by: Text to cell phone: [phone]  Will anyone else be joining your video visit? No      Assessment & Plan     Thrombosed external hemorrhoids  I will refer patient due to significant amount of pain  - Adult Gen Surg  Referral; Future    Post herpetic neuralgia  Improving  Slowly taper off lyrica            Subjective   Opal is a 43 year old, presenting for the following health issues:  RECHECK      6/25/2024     7:38 AM   Additional Questions   Roomed by Patient completed echeck in via "[employer]     History of Present Illness       Reason for visit:  Follow up for Shingles pain and treatment.  Lyrica has been tolerated well with an increase in dosage.  Discuss leaving for vacation for 2 weeks and making sure ill have enough with dosage increase.  Also started steroid pack.    She eats 2-3 servings of fruits and vegetables daily.She consumes 0 sweetened beverage(s) daily.She exercises with enough effort to increase her heart rate 10 to 19 minutes per day.  She exercises with enough effort to increase her heart rate 3 or less days per week.   She is taking medications regularly.  The patient was prescribed Lyrica for postherpetic neuralgia treatment after discontinuing steroids. She experienced a thrombosed hemorrhoid nine years ago following childbirth. She believes that Lyrica has aggravated her hemorrhoid, causing it to become thrombosed again. Currently, she is using Preparation H, sitz baths, and lidocaine for relief. She is gradually tapering off Lyrica.          Review of Systems  Constitutional, HEENT, cardiovascular, pulmonary, gi and gu systems are negative, except as otherwise noted.      Objective           Vitals:  No vitals were obtained today due to virtual visit.    Physical Exam   GENERAL: alert and no  distress  EYES: Eyes grossly normal to inspection.  No discharge or erythema, or obvious scleral/conjunctival abnormalities.  RESP: No audible wheeze, cough, or visible cyanosis.    PSYCH: Appropriate affect, tone, and pace of words      Video-Visit Details    Type of service:  Video Visit   Originating Location (pt. Location): Home    Distant Location (provider location):  On-site  Platform used for Video Visit: Brigitte  Signed Electronically by: Edyta Ellis MD

## 2024-07-15 ENCOUNTER — E-VISIT (OUTPATIENT)
Dept: FAMILY MEDICINE | Facility: CLINIC | Age: 44
End: 2024-07-15
Payer: COMMERCIAL

## 2024-07-15 DIAGNOSIS — B37.31 YEAST INFECTION OF THE VAGINA: Primary | ICD-10-CM

## 2024-07-15 PROCEDURE — 99421 OL DIG E/M SVC 5-10 MIN: CPT | Performed by: STUDENT IN AN ORGANIZED HEALTH CARE EDUCATION/TRAINING PROGRAM

## 2024-07-15 RX ORDER — FLUCONAZOLE 150 MG/1
150 TABLET ORAL ONCE
Qty: 1 TABLET | Refills: 0 | Status: SHIPPED | OUTPATIENT
Start: 2024-07-15 | End: 2024-07-15

## 2024-07-15 NOTE — PATIENT INSTRUCTIONS
Thank you for choosing us for your care. I have placed an order for a prescription so that you can start treatment. View your full visit summary for details by clicking on the link below. Your pharmacist will able to address any questions you may have about the medication.     If you re not feeling better within 2-3 days, please schedule an appointment.  You can schedule an appointment right here in [facility], or call [phone]  If the visit is for the same symptoms as your eVisit, we ll refund the cost of your eVisit if seen within seven days.    Vaginal Yeast Infection: Care Instructions  Overview     A vaginal yeast infection is the growth of too many yeast cells in the vagina. This is a common problem. Itching, vaginal discharge and irritation, and other symptoms can bother you. But yeast infections don't often cause other health problems.  Some medicines can increase your risk of getting a yeast infection. These include antibiotics, hormones, and steroids. You may also be more likely to get a yeast infection if you are pregnant, have diabetes, douche, or wear tight clothes.  With treatment, most yeast infections get better in a few days.  Follow-up care is a key part of your treatment and safety. Be sure to make and go to all appointments, and call your doctor if you are having problems. It's also a good idea to know your test results and keep a list of the medicines you take.  How can you care for yourself at home?  Take your medicines exactly as prescribed. Call your doctor if you think you are having a problem with your medicine.  Ask your doctor about over-the-counter (OTC) medicines for yeast infections. If you use an OTC treatment, read and follow all instructions on the label.  Don't use tampons while using a vaginal cream or suppository. The tampons can absorb the medicine. Use pads instead.  Wear loose cotton clothing. Don't wear nylon or other fabric that holds body heat and moisture close to the  "skin.  Try sleeping without underwear.  Don't scratch. Relieve itching with a cold pack or a cool bath.  Don't wash your vulva more than once a day. Use plain water or a mild, unscented soap. Air-dry the vulva.  Change out of wet or damp clothes as soon as possible.  If you are using a vaginal medicine, don't have sex until you have finished your treatment. But if you do have sex, don't depend on a condom or diaphragm for birth control. The oil in some vaginal medicines weakens latex.  Don't douche or use powders, sprays, or perfumes in your vagina or on your vulva. These items can change the normal balance of organisms in your vagina.  When should you call for help?   Call your doctor now or seek immediate medical care if:    You have new or increased pain in your vagina or pelvis.   Watch closely for changes in your health, and be sure to contact your doctor if:    You have unexpected vaginal bleeding.     You have a fever.     You are not getting better after 2 days.     Your symptoms come back after you finish your medicines.   Where can you learn more?  Go to https://www.[employer].net/patiented  Enter F639 in the search box to learn more about \"Vaginal Yeast Infection: Care Instructions.\"  Current as of: November 27, 2023               Content Version: 14.0    4749-3185 [employer].   Care instructions adapted under license by your healthcare professional. If you have questions about a medical condition or this instruction, always ask your healthcare professional. [employer] disclaims any warranty or liability for your use of this information.      "

## 2024-07-17 NOTE — TELEPHONE ENCOUNTER
Dr. Burr asked to have patient added to her Friday schedule.  Called patient, patient did end up having an appointment with her OB/GYN in [place] today they did swabs, urine and bloodwork and ended up putting her back on the Shingles Medication.  Dr. Burr informed.  Carolina Bullard MA

## 2024-09-10 ENCOUNTER — OFFICE VISIT (OUTPATIENT)
Dept: FAMILY MEDICINE | Facility: CLINIC | Age: 44
End: 2024-09-10
Attending: STUDENT IN AN ORGANIZED HEALTH CARE EDUCATION/TRAINING PROGRAM
Payer: COMMERCIAL

## 2024-09-10 VITALS
WEIGHT: 177 LBS | HEART RATE: 76 BPM | TEMPERATURE: 97.8 F | DIASTOLIC BLOOD PRESSURE: 76 MMHG | HEIGHT: 68 IN | RESPIRATION RATE: 16 BRPM | SYSTOLIC BLOOD PRESSURE: 124 MMHG | BODY MASS INDEX: 26.83 KG/M2 | OXYGEN SATURATION: 100 %

## 2024-09-10 DIAGNOSIS — Z00.00 ROUTINE GENERAL MEDICAL EXAMINATION AT A HEALTH CARE FACILITY: Primary | ICD-10-CM

## 2024-09-10 DIAGNOSIS — B02.29 POST HERPETIC NEURALGIA: ICD-10-CM

## 2024-09-10 DIAGNOSIS — Z13.220 SCREENING FOR HYPERLIPIDEMIA: ICD-10-CM

## 2024-09-10 DIAGNOSIS — Z23 ENCOUNTER FOR ADMINISTRATION OF VACCINE: ICD-10-CM

## 2024-09-10 DIAGNOSIS — Z12.11 SCREEN FOR COLON CANCER: ICD-10-CM

## 2024-09-10 DIAGNOSIS — E04.2 MULTINODULAR THYROID: ICD-10-CM

## 2024-09-10 DIAGNOSIS — D70.8 OTHER NEUTROPENIA (H): ICD-10-CM

## 2024-09-10 PROBLEM — G43.019 MIGRAINE WITHOUT AURA, INTRACTABLE, WITHOUT STATUS MIGRAINOSUS: Status: ACTIVE | Noted: 2017-01-16

## 2024-09-10 PROBLEM — A04.72 ENTEROCOLITIS DUE TO CLOSTRIDIUM DIFFICILE: Status: ACTIVE | Noted: 2017-04-12

## 2024-09-10 PROBLEM — N39.0 URINARY TRACT INFECTION: Status: RESOLVED | Noted: 2017-03-13 | Resolved: 2024-09-10

## 2024-09-10 LAB
BASOPHILS # BLD AUTO: 0.1 10E3/UL (ref 0–0.2)
BASOPHILS NFR BLD AUTO: 1 %
EOSINOPHIL # BLD AUTO: 0 10E3/UL (ref 0–0.7)
EOSINOPHIL NFR BLD AUTO: 1 %
ERYTHROCYTE [DISTWIDTH] IN BLOOD BY AUTOMATED COUNT: 12.1 % (ref 10–15)
HCT VFR BLD AUTO: 38.6 % (ref 35–47)
HGB BLD-MCNC: 13 G/DL (ref 11.7–15.7)
IMM GRANULOCYTES # BLD: 0 10E3/UL
IMM GRANULOCYTES NFR BLD: 0 %
LYMPHOCYTES # BLD AUTO: 1.3 10E3/UL (ref 0.8–5.3)
LYMPHOCYTES NFR BLD AUTO: 20 %
MCH RBC QN AUTO: 31.9 PG (ref 26.5–33)
MCHC RBC AUTO-ENTMCNC: 33.7 G/DL (ref 31.5–36.5)
MCV RBC AUTO: 95 FL (ref 78–100)
MONOCYTES # BLD AUTO: 0.4 10E3/UL (ref 0–1.3)
MONOCYTES NFR BLD AUTO: 7 %
NEUTROPHILS # BLD AUTO: 4.8 10E3/UL (ref 1.6–8.3)
NEUTROPHILS NFR BLD AUTO: 72 %
PLATELET # BLD AUTO: 225 10E3/UL (ref 150–450)
RBC # BLD AUTO: 4.08 10E6/UL (ref 3.8–5.2)
WBC # BLD AUTO: 6.6 10E3/UL (ref 4–11)

## 2024-09-10 PROCEDURE — 36415 COLL VENOUS BLD VENIPUNCTURE: CPT | Performed by: STUDENT IN AN ORGANIZED HEALTH CARE EDUCATION/TRAINING PROGRAM

## 2024-09-10 PROCEDURE — 86618 LYME DISEASE ANTIBODY: CPT | Performed by: STUDENT IN AN ORGANIZED HEALTH CARE EDUCATION/TRAINING PROGRAM

## 2024-09-10 PROCEDURE — 82306 VITAMIN D 25 HYDROXY: CPT | Performed by: STUDENT IN AN ORGANIZED HEALTH CARE EDUCATION/TRAINING PROGRAM

## 2024-09-10 PROCEDURE — 84443 ASSAY THYROID STIM HORMONE: CPT | Performed by: STUDENT IN AN ORGANIZED HEALTH CARE EDUCATION/TRAINING PROGRAM

## 2024-09-10 PROCEDURE — 82784 ASSAY IGA/IGD/IGG/IGM EACH: CPT | Performed by: STUDENT IN AN ORGANIZED HEALTH CARE EDUCATION/TRAINING PROGRAM

## 2024-09-10 PROCEDURE — 90471 IMMUNIZATION ADMIN: CPT | Performed by: STUDENT IN AN ORGANIZED HEALTH CARE EDUCATION/TRAINING PROGRAM

## 2024-09-10 PROCEDURE — 80053 COMPREHEN METABOLIC PANEL: CPT | Performed by: STUDENT IN AN ORGANIZED HEALTH CARE EDUCATION/TRAINING PROGRAM

## 2024-09-10 PROCEDURE — 85025 COMPLETE CBC W/AUTO DIFF WBC: CPT | Performed by: STUDENT IN AN ORGANIZED HEALTH CARE EDUCATION/TRAINING PROGRAM

## 2024-09-10 PROCEDURE — 99396 PREV VISIT EST AGE 40-64: CPT | Mod: 25 | Performed by: STUDENT IN AN ORGANIZED HEALTH CARE EDUCATION/TRAINING PROGRAM

## 2024-09-10 PROCEDURE — 86617 LYME DISEASE ANTIBODY: CPT | Performed by: STUDENT IN AN ORGANIZED HEALTH CARE EDUCATION/TRAINING PROGRAM

## 2024-09-10 PROCEDURE — 80061 LIPID PANEL: CPT | Performed by: STUDENT IN AN ORGANIZED HEALTH CARE EDUCATION/TRAINING PROGRAM

## 2024-09-10 PROCEDURE — 90656 IIV3 VACC NO PRSV 0.5 ML IM: CPT | Performed by: STUDENT IN AN ORGANIZED HEALTH CARE EDUCATION/TRAINING PROGRAM

## 2024-09-10 PROCEDURE — 99214 OFFICE O/P EST MOD 30 MIN: CPT | Mod: 25 | Performed by: STUDENT IN AN ORGANIZED HEALTH CARE EDUCATION/TRAINING PROGRAM

## 2024-09-10 NOTE — PATIENT INSTRUCTIONS
Patient Education   Preventive Care Advice   This is general advice given by our system to help you stay healthy. However, your care team may have specific advice just for you. Please talk to your care team about your preventive care needs.  Nutrition  Eat 5 or more servings of fruits and vegetables each day.  Try wheat bread, brown rice and whole grain pasta (instead of white bread, rice, and pasta).  Get enough calcium and vitamin D. Check the label on foods and aim for 100% of the RDA (recommended daily allowance).  Lifestyle  Exercise at least 150 minutes each week  (30 minutes a day, 5 days a week).  Do muscle strengthening activities 2 days a week. These help control your weight and prevent disease.  No smoking.  Wear sunscreen to prevent skin cancer.  Have a dental exam and cleaning every 6 months.  Yearly exams  See your health care team every year to talk about:  Any changes in your health.  Any medicines your care team has prescribed.  Preventive care, family planning, and ways to prevent chronic diseases.  Shots (vaccines)   HPV shots (up to age 26), if you've never had them before.  Hepatitis B shots (up to age 59), if you've never had them before.  COVID-19 shot: Get this shot when it's due.  Flu shot: Get a flu shot every year.  Tetanus shot: Get a tetanus shot every 10 years.  Pneumococcal, hepatitis A, and RSV shots: Ask your care team if you need these based on your risk.  Shingles shot (for age 50 and up)  General health tests  Diabetes screening:  Starting at age 35, Get screened for diabetes at least every 3 years.  If you are younger than age 35, ask your care team if you should be screened for diabetes.  Cholesterol test: At age 39, start having a cholesterol test every 5 years, or more often if advised.  Bone density scan (DEXA): At age 50, ask your care team if you should have this scan for osteoporosis (brittle bones).  Hepatitis C: Get tested at least once in your life.  STIs (sexually  transmitted infections)  Before age 24: Ask your care team if you should be screened for STIs.  After age 24: Get screened for STIs if you're at risk. You are at risk for STIs (including HIV) if:  You are sexually active with more than one person.  You don't use condoms every time.  You or a partner was diagnosed with a sexually transmitted infection.  If you are at risk for HIV, ask about PrEP medicine to prevent HIV.  Get tested for HIV at least once in your life, whether you are at risk for HIV or not.  Cancer screening tests  Cervical cancer screening: If you have a cervix, begin getting regular cervical cancer screening tests starting at age 21.  Breast cancer scan (mammogram): If you've ever had breasts, begin having regular mammograms starting at age 40. This is a scan to check for breast cancer.  Colon cancer screening: It is important to start screening for colon cancer at age 45.  Have a colonoscopy test every 10 years (or more often if you're at risk) Or, ask your provider about stool tests like a FIT test every year or Cologuard test every 3 years.  To learn more about your testing options, visit:   .  For help making a decision, visit:   https://bit.ly/kf84894.  Prostate cancer screening test: If you have a prostate, ask your care team if a prostate cancer screening test (PSA) at age 55 is right for you.  Lung cancer screening: If you are a current or former smoker ages 50 to 80, ask your care team if ongoing lung cancer screenings are right for you.  For informational purposes only. Not to replace the advice of your health care provider. Copyright   2023 [employer]. All rights reserved. Clinically reviewed by the [facility] Transitions Program. [employer] 859749 - REV 01/24.

## 2024-09-10 NOTE — PROGRESS NOTES
"Preventive Care Visit  [facility] [place] MD, Family Medicine  Sep 10, 2024      Assessment & Plan     Routine general medical examination at a health care facility    - Comprehensive metabolic panel (BMP + Alb, Alk Phos, ALT, AST, Total. Bili, TP); Future  - Comprehensive metabolic panel (BMP + Alb, Alk Phos, ALT, AST, Total. Bili, TP)    Multinodular thyroid  Biopsy was benign  - TSH with free T4 reflex; Future  - LYME DISEASE TOTAL ANTIBODIES WITH REFLEX TO CONFIRMATION; Future  - LYME DISEASE TOTAL ANTIBODIES WITH REFLEX TO CONFIRMATION  - TSH with free T4 reflex    Post herpetic neuralgia    - PRIMARY CARE FOLLOW-UP SCHEDULING    Other neutropenia (H24)  Unstable. Etiology uncertain at this time.   Differential diagnosis for low WBC includes vitamin and mineral deficiency, immune mediated, constitutional , virus or other.     - CBC with platelets and differential; Future  - IgG; Future  - IgM; Future  - Vitamin D Deficiency; Future  - Vitamin D Deficiency  - IgM  - IgG  - CBC with platelets and differential    Screening for hyperlipidemia    - Lipid panel reflex to direct LDL Non-fasting; Future  - Lipid panel reflex to direct LDL Non-fasting    Encounter for administration of vaccine    - INFLUENZA VACCINE,SPLIT VIRUS,TRIVALENT,PF(FLUZONE)    Screen for colon cancer    - Colonoscopy Screening  Referral; Future    Patient has been advised of split billing requirements and indicates understanding: Yes        BMI  Estimated body mass index is 27.14 kg/m  as calculated from the following:    Height as of this encounter: 1.72 m (5' 7.72\").    Weight as of this encounter: 80.3 kg (177 lb).   Weight management plan: Discussed healthy diet and exercise guidelines    Counseling  Appropriate preventive services were addressed with this patient via screening, questionnaire, or discussion as appropriate for fall prevention, nutrition, physical activity, Tobacco-use " cessation, social engagement, weight loss and cognition.  Checklist reviewing preventive services available has been given to the patient.  Reviewed patient's diet, addressing concerns and/or questions.   She is at risk for lack of exercise and has been provided with information to increase physical activity for the benefit of her well-being.           Kristofer Joseph is a 44 year old, presenting for the following:  Physical           HPI      Patient arrived for Annual Physical, not due for PAP.    Patient is not fasting for lab work.     Patient is worried about suppressed immune system as  she had recurrent infection (shingles and herpes )this year.  Her white blood cell was slightly low last year.      9/10/2024   General Health   How would you rate your overall physical health? (!) FAIR   Feel stress (tense, anxious, or unable to sleep) To some extent      (!) STRESS CONCERN      9/10/2024   Nutrition   Three or more servings of calcium each day? Yes   Diet: Regular (no restrictions)   How many servings of fruit and vegetables per day? (!) 2-3   How many sweetened beverages each day? 0-1            9/10/2024   Exercise   Days per week of moderate/strenous exercise 3 days            9/10/2024   Social Factors   Frequency of gathering with friends or relatives Once a week   Worry food won't last until get money to buy more No   Food not last or not have enough money for food? No   Do you have housing? (Housing is defined as stable permanent housing and does not include staying ouside in a car, in a tent, in an abandoned building, in an overnight shelter, or couch-surfing.) Yes   Are you worried about losing your housing? No   Lack of transportation? No   Unable to get utilities (heat,electricity)? No            9/10/2024   Dental   Dentist two times every year? Yes            9/10/2024   TB Screening   Were you born outside of the [place]? No            Today's PHQ-2 Score:       6/19/2024    10:21 AM   PHQ-2  ( 1999 [employer])   Q1: Little interest or pleasure in doing things 0   Q2: Feeling down, depressed or hopeless 0   PHQ-2 Score 0   Q1: Little interest or pleasure in doing things Not at all   Q2: Feeling down, depressed or hopeless Not at all   PHQ-2 Score 0         9/10/2024   Substance Use   Alcohol more than 3/day or more than 7/wk Not Applicable   Do you use any other substances recreationally? No    (!) DECLINE       Multiple values from one day are sorted in reverse-chronological order     Social History     Tobacco Use    Smoking status: Never    Smokeless tobacco: Never   Vaping Use    Vaping status: Never Used   Substance Use Topics    Alcohol use: No    Drug use: No           3/21/2024   LAST FHS-7 RESULTS   1st degree relative breast or ovarian cancer No   Any relative bilateral breast cancer No   Any male have breast cancer No   Any ONE woman have BOTH breast AND ovarian cancer No   Any woman with breast cancer before 50yrs No   2 or more relatives with breast AND/OR ovarian cancer No   2 or more relatives with breast AND/OR bowel cancer No          Mammogram Screening - Mammogram every 1-2 years updated in Health Maintenance based on mutual decision making        9/10/2024   STI Screening   New sexual partner(s) since last STI/HIV test? No        History of abnormal Pap smear:        ASCVD Risk   The ASCVD Risk score (Jazmin DK, et al., 2019) failed to calculate for the following reasons:    Cannot find a previous HDL lab    Cannot find a previous total cholesterol lab    Reviewed and updated as needed this visit by Provider                    Past Medical History:   Diagnosis Date    Anemia     Anxiety     Cervical dysplasia     Constipation     Migraine     Pneumonia     current cold     Past Surgical History:   Procedure Laterality Date    CONIZATION LEEP      DILATION AND CURETTAGE, HYSTEROSCOPY DIAGNOSTIC, COMBINED      DILATION AND CURETTAGE, HYSTEROSCOPY, ABLATE ENDOMETRIUM NOVASURE,  "COMBINED N/A 2017    Procedure: COMBINED DILATION AND CURETTAGE, HYSTEROSCOPY, ABLATE ENDOMETRIUM NOVASURE;  Surgeon: Caitlin House MD;  Location: [facility]    HEAD & NECK SURGERY      WISDOM TEETH EXTRACTED    HYSTERECTOMY, PAP NO LONGER INDICATED      LAPAROSCOPIC CYSTECTOMY OVARIAN (BENIGN) Right     1st trimester    LAPAROSCOPY DIAGNOSTIC (GYN)      WITH RIGHT SALPINGECTOMY, LAPAROSCOPY WITH JEM,      OB History    Para Term  AB Living   3 1 1 0 1 1   SAB IAB Ectopic Multiple Live Births   1 0 0 0 1      # Outcome Date GA Lbr Godwin/2nd Weight Sex Type Anes PTL Lv   3             2 SAB            1 Term         MAGNOLIA         Review of Systems  Constitutional, neuro, ENT, endocrine, pulmonary, cardiac, gastrointestinal, genitourinary, musculoskeletal, integument and psychiatric systems are negative, except as otherwise noted.  Constitutional, HEENT, cardiovascular, pulmonary, GI, , musculoskeletal, neuro, skin, endocrine and psych systems are negative, except as otherwise noted.     Objective    Exam  LMP 2019 (Exact Date)    Estimated body mass index is 24.97 kg/m  as calculated from the following:    Height as of 24: 1.778 m (5' 10\").    Weight as of 24: 78.9 kg (174 lb).    Physical Exam  GENERAL: alert and no distress  EYES: Eyes grossly normal to inspection, PERRL and conjunctivae and sclerae normal  HENT: ear canals and TM's normal, nose and mouth without ulcers or lesions  NECK: no scars  RESP: lungs clear to auscultation - no rales, rhonchi or wheezes  CV: regular rate and rhythm, normal S1 S2, no S3 or S4, no murmur, click or rub, no peripheral edema  ABDOMEN: soft, nontender, no hepatosplenomegaly, no masses and bowel sounds normal  MS: no gross musculoskeletal defects noted, no edema  SKIN: no suspicious lesions or rashes  NEURO: Normal strength and tone, mentation intact and speech normal  PSYCH: mentation appears normal, affect normal/bright        Signed " Electronically by: Edyta Ellis MD  ]\

## 2024-09-11 ENCOUNTER — MYC MEDICAL ADVICE (OUTPATIENT)
Dept: FAMILY MEDICINE | Facility: CLINIC | Age: 44
End: 2024-09-11
Payer: COMMERCIAL

## 2024-09-11 DIAGNOSIS — Z91.89 RISK OF EXPOSURE TO LYME DISEASE: ICD-10-CM

## 2024-09-11 DIAGNOSIS — B00.1 COLD SORE: Primary | ICD-10-CM

## 2024-09-11 DIAGNOSIS — Z86.19 HX OF LYME DISEASE: ICD-10-CM

## 2024-09-11 LAB
ALBUMIN SERPL BCG-MCNC: 4.6 G/DL (ref 3.5–5.2)
ALP SERPL-CCNC: 67 U/L (ref 40–150)
ALT SERPL W P-5'-P-CCNC: 17 U/L (ref 0–50)
ANION GAP SERPL CALCULATED.3IONS-SCNC: 11 MMOL/L (ref 7–15)
AST SERPL W P-5'-P-CCNC: 24 U/L (ref 0–45)
B BURGDOR IGG SERPL QL IA: 0.09 INDEX
B BURGDOR IGG SERPL QL IA: NONREACTIVE
B BURGDOR IGG+IGM SER QL: 1.28
B BURGDOR IGM SERPL QL IA: 0.31 INDEX
B BURGDOR IGM SERPL QL IA: NONREACTIVE
BILIRUB SERPL-MCNC: 0.3 MG/DL
BUN SERPL-MCNC: 15 MG/DL (ref 6–20)
CALCIUM SERPL-MCNC: 9.6 MG/DL (ref 8.8–10.4)
CHLORIDE SERPL-SCNC: 101 MMOL/L (ref 98–107)
CHOLEST SERPL-MCNC: 200 MG/DL
CREAT SERPL-MCNC: 0.8 MG/DL (ref 0.51–0.95)
EGFRCR SERPLBLD CKD-EPI 2021: >90 ML/MIN/1.73M2
FASTING STATUS PATIENT QL REPORTED: YES
FASTING STATUS PATIENT QL REPORTED: YES
GLUCOSE SERPL-MCNC: 99 MG/DL (ref 70–99)
HCO3 SERPL-SCNC: 26 MMOL/L (ref 22–29)
HDLC SERPL-MCNC: 77 MG/DL
IGG SERPL-MCNC: 976 MG/DL (ref 610–1616)
IGM SERPL-MCNC: 130 MG/DL (ref 35–242)
LDLC SERPL CALC-MCNC: 112 MG/DL
NONHDLC SERPL-MCNC: 123 MG/DL
POTASSIUM SERPL-SCNC: 4.1 MMOL/L (ref 3.4–5.3)
PROT SERPL-MCNC: 7.6 G/DL (ref 6.4–8.3)
SODIUM SERPL-SCNC: 138 MMOL/L (ref 135–145)
TRIGL SERPL-MCNC: 56 MG/DL
TSH SERPL DL<=0.005 MIU/L-ACNC: 0.64 UIU/ML (ref 0.3–4.2)
VIT D+METAB SERPL-MCNC: 41 NG/ML (ref 20–50)

## 2024-09-11 RX ORDER — DOXYCYCLINE 100 MG/1
100 CAPSULE ORAL 2 TIMES DAILY
Qty: 28 CAPSULE | Refills: 0 | Status: SHIPPED | OUTPATIENT
Start: 2024-09-11 | End: 2024-09-25

## 2024-09-13 RX ORDER — VALACYCLOVIR HYDROCHLORIDE 1 G/1
1000 TABLET, FILM COATED ORAL 2 TIMES DAILY
Qty: 20 TABLET | Refills: 3 | Status: SHIPPED | OUTPATIENT
Start: 2024-09-13 | End: 2024-09-23

## 2024-09-16 ENCOUNTER — MYC MEDICAL ADVICE (OUTPATIENT)
Dept: FAMILY MEDICINE | Facility: CLINIC | Age: 44
End: 2024-09-16
Payer: COMMERCIAL

## 2024-09-17 NOTE — TELEPHONE ENCOUNTER
Patient Quality Outreach    Patient is due for the following:   Colon Cancer Screening    Next Steps:   Schedule a office visit for orders    Type of outreach:    Sent [employer] message.      Questions for provider review:    None           Carolina Bullard, CMA

## 2024-09-23 ENCOUNTER — TELEPHONE (OUTPATIENT)
Dept: GASTROENTEROLOGY | Facility: CLINIC | Age: 44
End: 2024-09-23
Payer: COMMERCIAL

## 2024-09-23 NOTE — TELEPHONE ENCOUNTER
Pre Assessment RN Review    Focused Assessments    Pain Medication Review    Per scheduling questionnaire, patient reports taking prescription pain medication three or more times per week.    Per chart review, patient does have an active prescription for an opioid medication. Prescribed Medication(s): Tramadol      Scheduling Status & Recommendations    Sedation: MAC/Deep Sedation - Per RN assessment.

## 2024-09-23 NOTE — TELEPHONE ENCOUNTER
"Endoscopy Scheduling Screen    Have you had any respiratory illness or flu-like symptoms in the last 10 days?  No    What is your communication preference for Instructions and/or Bowel Prep?   MyChart    What insurance is in the chart?  Other:  [employer]    Ordering/Referring Provider: Edyta Ellis MD   (If ordering provider performs procedure, schedule with ordering provider unless otherwise instructed. )    BMI: Estimated body mass index is 27.14 kg/m  as calculated from the following:    Height as of 9/10/24: 1.72 m (5' 7.72\").    Weight as of 9/10/24: 80.3 kg (177 lb).     Sedation Ordered  moderate sedation.   If patient BMI > 50 do not schedule in [facility].    If patient BMI > 45 do not schedule at [facility].    Are you taking methadone or Suboxone?  NO, No RN review required.    Have you been diagnosed and are being treated for severe PTSD or severe anxiety?  NO, No RN review required.    Are you taking any prescription medications for pain 3 or more times per week?   NO, No RN review required.    Do you have a history of malignant hyperthermia?  No    (Females) Are you currently pregnant?   No     Have you been diagnosed or told you have pulmonary hypertension?   No    Do you have an LVAD?  No    Have you been told you have moderate to severe sleep apnea?  No.    Have you been told you have COPD, asthma, or any other lung disease?  No    Do you have any heart conditions?  No     Have you ever had or are you waiting for an organ transplant?  No. Continue scheduling, no site restrictions.    Have you had a stroke or transient ischemic attack (TIA aka \"mini stroke\" in the last 6 months?   No    Have you been diagnosed with or been told you have cirrhosis of the liver?   No.    Are you currently on dialysis?   No    Do you need assistance transferring?   No    BMI: Estimated body mass index is 27.14 kg/m  as calculated from the following:    Height as of 9/10/24: 1.72 m (5' 7.72\").    Weight as of 9/10/24: 80.3 " kg (177 lb).     Is patients BMI > 40 and scheduling location [facility]?  No    Do you take an injectable or oral medication for weight loss or diabetes (excluding insulin)?  No    Do you take the medication Naltrexone?  No    Do you take blood thinners?  No       Prep   Are you currently on dialysis or do you have chronic kidney disease?  No    Do you have a diagnosis of diabetes?  No    Do you have a diagnosis of cystic fibrosis (CF)?  No    On a regular basis do you go 3 -5 days between bowel movements?  No    BMI > 40?  No    Preferred Pharmacy:        [facility] [id number] [facility] [place] - [address]  [address]  [place] [address]  Phone: [phone] Fax: [phone]      Final Scheduling Details     Procedure scheduled  Colonoscopy    Surgeon:  Jane     Date of procedure:  11/07/2024     Pre-OP / PAC:   No - Not required for this site.    Location  [facility] - Patient preference.    Sedation   MAC/Deep Sedation - Per RN assessment.and location      Patient Reminders:   You will receive a call from a Nurse to review instructions and health history.  This assessment must be completed prior to your procedure.  Failure to complete the Nurse assessment may result in the procedure being cancelled.      On the day of your procedure, please designate an adult(s) who can drive you home stay with you for the next 24 hours. The medicines used in the exam will make you sleepy. You will not be able to drive.      You cannot take public transportation, ride share services, or non-medical taxi service without a responsible caregiver.  Medical transport services are allowed with the requirement that a responsible caregiver will receive you at your destination.  We require that drivers and caregivers are confirmed prior to your procedure.

## 2024-10-11 ENCOUNTER — ANCILLARY PROCEDURE (OUTPATIENT)
Dept: ULTRASOUND IMAGING | Facility: CLINIC | Age: 44
End: 2024-10-11
Attending: INTERNAL MEDICINE
Payer: COMMERCIAL

## 2024-10-11 ENCOUNTER — OFFICE VISIT (OUTPATIENT)
Dept: ENDOCRINOLOGY | Facility: CLINIC | Age: 44
End: 2024-10-11
Payer: COMMERCIAL

## 2024-10-11 VITALS
OXYGEN SATURATION: 100 % | WEIGHT: 174 LBS | HEART RATE: 87 BPM | BODY MASS INDEX: 26.68 KG/M2 | DIASTOLIC BLOOD PRESSURE: 92 MMHG | SYSTOLIC BLOOD PRESSURE: 139 MMHG | RESPIRATION RATE: 16 BRPM

## 2024-10-11 DIAGNOSIS — E04.1 THYROID NODULE: Primary | ICD-10-CM

## 2024-10-11 DIAGNOSIS — Z78.0 MENOPAUSE: ICD-10-CM

## 2024-10-11 DIAGNOSIS — E04.1 THYROID NODULE: ICD-10-CM

## 2024-10-11 LAB
ESTRADIOL SERPL-MCNC: 118 PG/ML
FSH SERPL IRP2-ACNC: 4.5 MIU/ML
LH SERPL-ACNC: 3.1 MIU/ML

## 2024-10-11 PROCEDURE — 76536 US EXAM OF HEAD AND NECK: CPT

## 2024-10-11 PROCEDURE — 83002 ASSAY OF GONADOTROPIN (LH): CPT | Performed by: INTERNAL MEDICINE

## 2024-10-11 PROCEDURE — 82670 ASSAY OF TOTAL ESTRADIOL: CPT | Performed by: INTERNAL MEDICINE

## 2024-10-11 PROCEDURE — 83001 ASSAY OF GONADOTROPIN (FSH): CPT | Performed by: INTERNAL MEDICINE

## 2024-10-11 PROCEDURE — 36415 COLL VENOUS BLD VENIPUNCTURE: CPT | Performed by: INTERNAL MEDICINE

## 2024-10-11 PROCEDURE — 99214 OFFICE O/P EST MOD 30 MIN: CPT | Performed by: INTERNAL MEDICINE

## 2024-10-11 RX ORDER — ONDANSETRON 4 MG/1
4 TABLET, ORALLY DISINTEGRATING ORAL
COMMUNITY
Start: 2024-09-24

## 2024-10-11 NOTE — PATIENT INSTRUCTIONS
Welcome to the [facility] Endocrinology and Diabetes Clinics     Our Endocrinology Clinics are here to provide you with a team-based, collaborative approach in the diagnosis and treatment of patients with diabetes and endocrine disorders. The team is made up of Physicians, Physician Assistants, Certified Diabetes Educators, Registered Nurses, Medical Assistants, Emergency Medical Technicians, and many others, all of whom have the unified goal of providing our patients with high quality care.     Please see below for some helpful tips to best navigate and use the [facility] Endocrinology clinic:     [place] Respect: At [facility], we are committed to a respectful and safe space for all patients, visitors, and staff.  We believe that mutual respect between patients and their care team is the foundation of quality care.  It is our expectation that you will be treated with respect by your care team.  In turn, we ask that all communication with the care team (written and verbal) be respectful and free from profanity, threatening, or abusive language.  Disrespectful communication undermines our therapeutic relationship with you and may result in us being unable to continue to provide your care.    Refills: A provider must see you at least annually to prescribe and refill medications. This is to ensure your safety as well as meet insurance and compliance regulations.    Scheduling: Many of our Providers offer both in-person or video visits. Please call to schedule any needed follow ups as soon as possible because our provider schedules fill up very quickly. Our care team has the right to require an in-person visit when they believe that it is medically necessary. Please remember that for any virtual visits, you must be in the [facility] at the time of the visit, otherwise we are unable to see you and you will need to be rescheduled.    Missed Appointments: If you need to cancel or miss your  scheduled appointment, please call the clinic at [phone] to reschedule.  Please note if you repeatedly miss appointments or repeatedly miss appointments without calling to inform us ahead of time (no-show), the clinic may elect to not allow you to reschedule without speaking to a manager, may require a Partnership In Care Agreement prior to rescheduling, or could result in you no longer being able to receive care from the clinic. Providing the clinic with timely notification if you have to miss an appointment, allows us to better serve the needs of all of our patients.    Primary Care Provider: Our Endocrinologists are Specialists in their field. We expect you to have a Primary Care Provider established to handle any needs outside of your diabetes and endocrine care.  We would be happy to assist you find a Primary Care Provider, if you do not have one.    "[employer]: "[employer] is a wonderful resource that allows you access to your Care Team via online or the georgi. Please ask a member of the team if you would like help creating an account. Please note that it may take up to 2 business days for a response. "[employer] messages are not reviewed on weekends or after business hours.  Emergent or urgent care needs should never be communicated via "[employer].  If you experience a medical emergency call 911 or go to the nearest emergency room.    Labs: It is recommended that you stay within the [facility] for labs but you are welcome to obtain ordered labs (with some exceptions) from any location of your choice as long as they are able to complete and process the needed labs. If you need us to fax orders to your preferred lab, please provide us the name and fax number of the lab you would like to go to so we can fax the orders. If your labs are drawn outside of the [facility], please have them fax the results to [phone] ([place]) or [phone] ([place]) or via [facility]. It is your  responsibility to ensure that outside lab results are sent to us.    We look forward to working with you. Please do not hesitate to reach out with any questions.    Thank you,    The Endocrine Team    [facility] Address:   [place] Address:     [address] [place] [address]    Phone: [phone]  Fax: [phone] [address] [place] [address]    Phone: [phone]  Fax: [phone]     [facility] Cost Estimate Phone Number: [phone]    General Lab and Imaging Scheduling Phone Number: [phone]

## 2024-10-11 NOTE — PROGRESS NOTES
Endocrinology Follow up         Reason for Endocrine consult: Multinodular thyroid    Assessment and Plan  A 44 year old female with a history of asthma, endometriosis and migraine was referred for assessment of thyroid nodules.  She was found to have multiple thyroid nodules on the ultrasound on 5/10/2023.  The concerning nodule was in the right lower thyroid nodule 1 cm solid hypoechoic with punctate echoic foci FNA) with benign results.    #Thyroid nodules:  Right lower thyroid nodule FNA with benign results in 6/2023. Other nodules does not meet the criteria for FNA by PIERCE or ACR. Most recent ULT neck 10/11 with decrease in size of largest nodule in right lobe. Patient is clinically euthyroid with no symptoms of hypo-/ hyperthyroidism.    TSH 9/10- 0.64, wnl    Plan:  -RTC in 1 year with thyroid ultrasound.    #Menopause  Patient w/ hx of hysterectomy 2/2 endometriosis and heavy bleeding in 2020 reports hair loss in the past 2-3 years, insomnia, fatigue concerning for menopause. Discussed estrogen replacement if menopausal. Of note, patient does not smoke, has no hx of VTE but does have a 15-20yrs hx of migraines for which she sees neurology.    - FSH, LH, Estradiol  - Patient to reach out if interested in estrogen therapy    Staffed with Dr. Brian Rock MD  PGY2 IM resident, [facility]     Interval History as of 10/11/2024 : Patient is doing well. Was sick the last 2 months, thought to have lyme. Had singles recently. Feels she is just getting her energy levels back, goes to the gym once a week and goes for walks often. No neck lumps or bumps, no dysphagia dyspnea or dysphonia, no neck tightness, dry skin, weight loss and weight gain. Endorses continued cold intolerance.  HPI: Vikki Merlos is a 44 year old female with PMHx of migraine, cervical dysplasia and anxiety she was referred to endocrinology for assessment of thyroid nodules.  She presented to an urgent care on 4/19/2023  complaining of sore throat for 1 week started on short course of Medrol.  Had follow-up with her PCP on 5/10/2023 had an ultrasound neck soft tissue done showed multiple thyroid nodules the most significant one  is the right lower thyroid nodule 1 x 0.8 x 0.6 cm solid hypoechoic with punctate echogenic foci TR 5.  On 6/2/2023 had FNA of the right lower thyroid nodule with benign cytology.  No history of radiation to the neck.  Not aware of any family history of thyroid cancer.  No fatigue, hs ongoing cold intolerance, sometimes constipation , some increase in the weight recently 10 Ib over the last 9 months, has new hair loss started 6 months ago. Had hysterectomy.     ROS:  All 12 systems were reviewed and negative except as mentioned in HPI  Answers for HPI/ROS submitted by the patient on 10/9/2023  General Symptoms: No  Skin Symptoms: No  HENT Symptoms: No  EYE SYMPTOMS: No  HEART SYMPTOMS: No  LUNG SYMPTOMS: No  INTESTINAL SYMPTOMS: No  URINARY SYMPTOMS: No  GYNECOLOGIC SYMPTOMS: No  BREAST SYMPTOMS: No  SKELETAL SYMPTOMS: No  BLOOD SYMPTOMS: No  NERVOUS SYSTEM SYMPTOMS: No  MENTAL HEALTH SYMPTOMS: No  Past Medical/Surgical History:  Past Medical History:   Diagnosis Date    Anemia     Anxiety     Cervical dysplasia     Constipation     Migraine     Pneumonia     current cold     Past Surgical History:   Procedure Laterality Date    CONIZATION LEEP      DILATION AND CURETTAGE, HYSTEROSCOPY DIAGNOSTIC, COMBINED      DILATION AND CURETTAGE, HYSTEROSCOPY, ABLATE ENDOMETRIUM NOVASURE, COMBINED N/A 03/09/2017    Procedure: COMBINED DILATION AND CURETTAGE, HYSTEROSCOPY, ABLATE ENDOMETRIUM NOVASURE;  Surgeon: Caitlin House MD;  Location: [facility]    HEAD & NECK SURGERY      WISDOM TEETH EXTRACTED    HYSTERECTOMY, PAP NO LONGER INDICATED      LAPAROSCOPIC CYSTECTOMY OVARIAN (BENIGN) Right     1st trimester    LAPAROSCOPY DIAGNOSTIC (GYN)      WITH RIGHT SALPINGECTOMY, LAPAROSCOPY WITH JEM,        Allergies:  Allergies    Allergen Reactions    Fremanezumab Hives and Itching       PTA Meds:  Prior to Admission medications    Medication Sig Last Dose Taking? Auth Provider Long Term End Date   cyclobenzaprine (FLEXERIL) 10 MG tablet    Reported, Patient     EMGALITY 120 MG/ML injection INJECT 1 ML INTO THE MUSCLE EVERY 30 (THIRTY) DAYS  Patient not taking: Reported on 4/19/2023   Reported, Patient     methylPREDNISolone (MEDROL DOSEPAK) 4 MG tablet therapy pack Follow Package Directions  Patient not taking: Reported on 5/10/2023   Preeti Lee PA-C     Naproxen Sodium (ALEVE PO) Take 220 mg by mouth daily as needed for moderate pain   Patient not taking: Reported on 10/2/2022   Reported, Patient     rimegepant (NURTEC) 75 MG ODT tablet Take 1 tab at onset of migraine. Max of 1 tab per 24 hours.  Patient not taking: Reported on 4/19/2023   Reported, Patient     rizatriptan (MAXALT-MLT) 10 MG ODT START AT ONSET OF MIGRAINES. MAY REPEAT AFTER TWO HOURS. MAXIMUM DOSE 30MG/24 HOURS.  Patient not taking: Reported on 4/19/2023   Reported, Patient     scopolamine (TRANSDERM) 1 MG/3DAYS 72 hr patch Place 1 patch onto the skin every 72 hours (apply 1 patch every 3 days prn; Start: >4h before event; Info: do not cut patch)  Patient not taking: Reported on 4/19/2023   Edyta Ellis MD     SUMAtriptan (IMITREX) 100 MG tablet TAKE 1 TABLET BY MOUTH AS DIRECTED. MAY REPEAT AFTER TWO HOURS. MAXIMUM DOSE 200 MG/24 HOURS.   Reported, Patient No    terbinafine (LAMISIL) 250 MG tablet Take 1 tablet (250 mg) by mouth daily  Patient not taking: Reported on 10/2/2022   Darnell Colon DPM     tiZANidine (ZANAFLEX) 4 MG tablet TAKE 1/2-1 TAB AT NIGHTY AS NEEDED FOR NECK PAIN  Patient not taking: Reported on 4/19/2023   Reported, Patient     TRAMADOL HCL PO Take  mg by mouth every 6 hours as needed for moderate to severe pain    Reported, Patient          Current heard:   Current Outpatient Medications   Medication Sig Dispense  Refill    cyclobenzaprine (FLEXERIL) 10 MG tablet       ondansetron (ZOFRAN ODT) 4 MG ODT tab Take 4 mg by mouth.      SUMAtriptan (IMITREX) 100 MG tablet TAKE 1 TABLET BY MOUTH AS DIRECTED. MAY REPEAT AFTER TWO HOURS. MAXIMUM DOSE 200 MG/24 HOURS.      TRAMADOL HCL PO Take  mg by mouth every 6 hours as needed for moderate to severe pain       valACYclovir (VALTREX) 1000 mg tablet Take 1 tablet (1,000 mg) by mouth 2 times daily for 10 days. 20 tablet 3     No current facility-administered medications for this visit.       Family History:  No family history on file.  Maternal Aunt : Hashimoto thyroiditis   Maternal cousin: hypothyroidism      Social History:  Social History     Tobacco Use    Smoking status: Never    Smokeless tobacco: Never   Substance Use Topics    Alcohol use: No         Physical examination:  General appearance: seated comfortably in the chair during assessment. Not in any acute distress  HEENT: No thyromegaly, no palpable thyroid nodules , no Cervical LN    Lungs: bilateral air entry equal. Clear to auscultation. No rhonchi or crepitations heard  Heart: S1 S2 normal. Pulse: regular rate and rhythm, good volume  Abdomen: soft, nontender, nondistended  Neurological: conscious and oriented. Speech: normal. Moving all four extremities equally  Extremities: no edema noted. . No tremor is noted over her outstretched hands  Psychiatric: normal mood and affect. Normal judgment    Endocrine Labs:   Latest Reference Range & Units 10/11/23 14:45 09/10/24 14:35   TSH 0.30 - 4.20 uIU/mL 0.72 0.64         Pathology report of FNA on 6/2/2023:  Final Diagnosis   Specimen A                 Interpretation:                  Benign  Consistent with a benign nodule (includes adenomatoid nodule, colloid nodule, etc.)     The [place] implied risk of malignancy and recommended clinical management:  Benign has a 0-3% risk of malignancy, recommended management is clinical follow-up.                  Adequacy:                  Satisfactory for evaluation       Ultrasound neck soft tissue on 5/10/2023:    FINDINGS:  RIGHT lobe: 6.1 x 1.6 x 1.7 cm. Heterogeneous with multiple nodules.  Isthmus: 3 mm. Single nodule.  LEFT lobe: 5.8 x 1.6 x 1.0 cm. Heterogeneous with multiple nodules.     NECK: No cervical lymphadenopathy.     NODULES:     Nodule 1: Lower right thyroid lobe nodule measuring 1.6 x 0.9 x 1.1  cm.   Composition: Spongiform, 0 points   Echogenicity: Hypoechoic, 2 points   Shape: Wider-than-tall, 0 points   Margin: Smooth, 0 points   Echogenic Foci: None, or large comet-tail artifacts, 0 points   Point Total: 1-2 points. TI-RADS 2. No FNA.      Nodule 2: Lower right thyroid lobe nodule measuring 1.0 x 0.8 x 0.6 cm  Composition: Solid or almost completely solid, 2 points   Echogenicity: Hypoechoic, 2 points   Shape: Wider-than-tall, 0 points   Margin: Smooth, 0 points   Echogenic Foci: Punctate echoic foci, 3 points   Point Total: 7 points or more. TI-RADS 5. If 1 cm or larger, recommend  FNA; if 0.5 cm or larger, follow up US annually for 5 years.     Nodule 3: Right isthmus nodule measuring 1.1 x 0.8 x 0.4 cm  Composition: Solid or almost completely solid, 2 points   Echogenicity: Hyperechoic or isoechoic, 1 point   Shape: Wider-than-tall, 0 points   Margin: Smooth, 0 points   Echogenic Foci: None, or large comet-tail artifacts, 0 points   Point Total: 3 points. TI-RADS 3. If 2.5 cm or larger, recommend FNA;  if 1.5 cm or larger, recommend follow up US at 1, 3, and 5 years.     Nodule 4: Lower left thyroid lobe nodule measuring 0.7 x 0.7 x 0.6 cm  Composition: Solid or almost completely solid, 2 points   Echogenicity: Hypoechoic, 2 points   Shape: Not taller than wide, 0 points   Margin: Smooth, 0 points   Echogenic Foci: None, or large comet-tail artifacts, 0 points   Point Total: 4-6 points. TI-RADS 4. If 1.5 cm or larger, recommend  FNA; if 1 cm or larger, follow up US (annually for 5 years).                                                                       IMPRESSION:  1.  Multinodular thyroid with a 1.0 cm lower right thyroid lobe  TI-RADS 5 nodule with punctate echogenic foci. FNA is recommended.  2.  No cervical adenopathy.     Ultrasound neck soft tissue on 10/22/2024  - Pending read

## 2024-10-11 NOTE — NURSING NOTE
Vikki Merlos's goals for this visit include:   Chief Complaint   Patient presents with    Follow Up    Thyroid Problem       She requests these members of her care team be copied on today's visit information: yes    PCP: Edyta Ellis    Referring Provider:  Edyta Ellis MD  [id number] [place] [address]    BP (!) 139/92 (BP Location: Left arm, Patient Position: Sitting, Cuff Size: Adult Regular)   Pulse 87   Resp 16   Wt 78.9 kg (174 lb)   LMP 11/20/2019 (Exact Date)   SpO2 100%   BMI 26.68 kg/m      Do you need any medication refills at today's visit? None    Tio Sheikh, EMT

## 2024-10-11 NOTE — LETTER
10/11/2024      Vikki Merlos  [id number] [facility] [id number]      Dear Colleague,    Thank you for referring your patient, Vikki Merlos, to the [facility]. Please see a copy of my visit note below.      Endocrinology Follow up         Reason for Endocrine consult: Multinodular thyroid    Assessment and Plan  A 44 year old female with a history of asthma, endometriosis and migraine was referred for assessment of thyroid nodules.  She was found to have multiple thyroid nodules on the ultrasound on 5/10/2023.  The concerning nodule was in the right lower thyroid nodule 1 cm solid hypoechoic with punctate echoic foci FNA) with benign results.    #Thyroid nodules:  Right lower thyroid nodule FNA with benign results in 6/2023. Other nodules does not meet the criteria for FNA by PIERCE or ACR. Most recent ULT neck 10/11 with decrease in size of largest nodule in right lobe. Patient is clinically euthyroid with no symptoms of hypo-/ hyperthyroidism.    TSH 9/10- 0.64, wnl    Plan:  -RTC in 1 year with thyroid ultrasound.    #Menopause  Patient w/ hx of hysterectomy 2/2 endometriosis and heavy bleeding in 2020 reports hair loss in the past 2-3 years, insomnia, fatigue concerning for menopause. Discussed estrogen replacement if menopausal. Of note, patient does not smoke, has no hx of VTE but does have a 15-20yrs hx of migraines for which she sees neurology.    - FSH, LH, Estradiol  - Patient to reach out if interested in estrogen therapy    Staffed with Dr. Brian Rock MD  PGY2 IM resident, [facility]     Interval History as of 10/11/2024 : Patient is doing well. Was sick the last 2 months, thought to have lyme. Had singles recently. Feels she is just getting her energy levels back, goes to the gym once a week and goes for walks often. No neck lumps or bumps, no dysphagia dyspnea or dysphonia, no neck tightness, dry skin, weight loss and weight gain. Endorses continued  cold intolerance.  HPI: Vikki Merlos is a 44 year old female with PMHx of migraine, cervical dysplasia and anxiety she was referred to endocrinology for assessment of thyroid nodules.  She presented to an urgent care on 4/19/2023 complaining of sore throat for 1 week started on short course of Medrol.  Had follow-up with her PCP on 5/10/2023 had an ultrasound neck soft tissue done showed multiple thyroid nodules the most significant one  is the right lower thyroid nodule 1 x 0.8 x 0.6 cm solid hypoechoic with punctate echogenic foci TR 5.  On 6/2/2023 had FNA of the right lower thyroid nodule with benign cytology.  No history of radiation to the neck.  Not aware of any family history of thyroid cancer.  No fatigue, hs ongoing cold intolerance, sometimes constipation , some increase in the weight recently 10 Ib over the last 9 months, has new hair loss started 6 months ago. Had hysterectomy.     ROS:  All 12 systems were reviewed and negative except as mentioned in HPI  Answers for HPI/ROS submitted by the patient on 10/9/2023  General Symptoms: No  Skin Symptoms: No  HENT Symptoms: No  EYE SYMPTOMS: No  HEART SYMPTOMS: No  LUNG SYMPTOMS: No  INTESTINAL SYMPTOMS: No  URINARY SYMPTOMS: No  GYNECOLOGIC SYMPTOMS: No  BREAST SYMPTOMS: No  SKELETAL SYMPTOMS: No  BLOOD SYMPTOMS: No  NERVOUS SYSTEM SYMPTOMS: No  MENTAL HEALTH SYMPTOMS: No  Past Medical/Surgical History:  Past Medical History:   Diagnosis Date     Anemia      Anxiety      Cervical dysplasia      Constipation      Migraine      Pneumonia     current cold     Past Surgical History:   Procedure Laterality Date     CONIZATION LEEP       DILATION AND CURETTAGE, HYSTEROSCOPY DIAGNOSTIC, COMBINED       DILATION AND CURETTAGE, HYSTEROSCOPY, ABLATE ENDOMETRIUM NOVASURE, COMBINED N/A 03/09/2017    Procedure: COMBINED DILATION AND CURETTAGE, HYSTEROSCOPY, ABLATE ENDOMETRIUM NOVASURE;  Surgeon: Caitlin House MD;  Location: [facility]     HEAD & NECK SURGERY       WISDOM TEETH EXTRACTED     HYSTERECTOMY, PAP NO LONGER INDICATED       LAPAROSCOPIC CYSTECTOMY OVARIAN (BENIGN) Right     1st trimester     LAPAROSCOPY DIAGNOSTIC (GYN)      WITH RIGHT SALPINGECTOMY, LAPAROSCOPY WITH JEM,        Allergies:  Allergies   Allergen Reactions     Fremanezumab Hives and Itching       PTA Meds:  Prior to Admission medications    Medication Sig Last Dose Taking? Auth Provider Long Term End Date   cyclobenzaprine (FLEXERIL) 10 MG tablet    Reported, Patient     EMGALITY 120 MG/ML injection INJECT 1 ML INTO THE MUSCLE EVERY 30 (THIRTY) DAYS  Patient not taking: Reported on 4/19/2023   Reported, Patient     methylPREDNISolone (MEDROL DOSEPAK) 4 MG tablet therapy pack Follow Package Directions  Patient not taking: Reported on 5/10/2023   Preeti Lee PA-C     Naproxen Sodium (ALEVE PO) Take 220 mg by mouth daily as needed for moderate pain   Patient not taking: Reported on 10/2/2022   Reported, Patient     rimegepant (NURTEC) 75 MG ODT tablet Take 1 tab at onset of migraine. Max of 1 tab per 24 hours.  Patient not taking: Reported on 4/19/2023   Reported, Patient     rizatriptan (MAXALT-MLT) 10 MG ODT START AT ONSET OF MIGRAINES. MAY REPEAT AFTER TWO HOURS. MAXIMUM DOSE 30MG/24 HOURS.  Patient not taking: Reported on 4/19/2023   Reported, Patient     scopolamine (TRANSDERM) 1 MG/3DAYS 72 hr patch Place 1 patch onto the skin every 72 hours (apply 1 patch every 3 days prn; Start: >4h before event; Info: do not cut patch)  Patient not taking: Reported on 4/19/2023   Edyta Ellis MD     SUMAtriptan (IMITREX) 100 MG tablet TAKE 1 TABLET BY MOUTH AS DIRECTED. MAY REPEAT AFTER TWO HOURS. MAXIMUM DOSE 200 MG/24 HOURS.   Reported, Patient No    terbinafine (LAMISIL) 250 MG tablet Take 1 tablet (250 mg) by mouth daily  Patient not taking: Reported on 10/2/2022   Darnell Colon DPM     tiZANidine (ZANAFLEX) 4 MG tablet TAKE 1/2-1 TAB AT NIGHTY AS NEEDED FOR NECK PAIN  Patient not  taking: Reported on 4/19/2023   Reported, Patient     TRAMADOL HCL PO Take  mg by mouth every 6 hours as needed for moderate to severe pain    Reported, Patient          Current heard:   Current Outpatient Medications   Medication Sig Dispense Refill     cyclobenzaprine (FLEXERIL) 10 MG tablet        ondansetron (ZOFRAN ODT) 4 MG ODT tab Take 4 mg by mouth.       SUMAtriptan (IMITREX) 100 MG tablet TAKE 1 TABLET BY MOUTH AS DIRECTED. MAY REPEAT AFTER TWO HOURS. MAXIMUM DOSE 200 MG/24 HOURS.       TRAMADOL HCL PO Take  mg by mouth every 6 hours as needed for moderate to severe pain        valACYclovir (VALTREX) 1000 mg tablet Take 1 tablet (1,000 mg) by mouth 2 times daily for 10 days. 20 tablet 3     No current facility-administered medications for this visit.       Family History:  No family history on file.  Maternal Aunt : Hashimoto thyroiditis   Maternal cousin: hypothyroidism      Social History:  Social History     Tobacco Use     Smoking status: Never     Smokeless tobacco: Never   Substance Use Topics     Alcohol use: No         Physical examination:  General appearance: seated comfortably in the chair during assessment. Not in any acute distress  HEENT: No thyromegaly, no palpable thyroid nodules , no Cervical LN    Lungs: bilateral air entry equal. Clear to auscultation. No rhonchi or crepitations heard  Heart: S1 S2 normal. Pulse: regular rate and rhythm, good volume  Abdomen: soft, nontender, nondistended  Neurological: conscious and oriented. Speech: normal. Moving all four extremities equally  Extremities: no edema noted. . No tremor is noted over her outstretched hands  Psychiatric: normal mood and affect. Normal judgment    Endocrine Labs:   Latest Reference Range & Units 10/11/23 14:45 09/10/24 14:35   TSH 0.30 - 4.20 uIU/mL 0.72 0.64         Pathology report of FNA on 6/2/2023:  Final Diagnosis   Specimen A                 Interpretation:                  Benign  Consistent with a  benign nodule (includes adenomatoid nodule, colloid nodule, etc.)     The [place] implied risk of malignancy and recommended clinical management:  Benign has a 0-3% risk of malignancy, recommended management is clinical follow-up.                  Adequacy:                 Satisfactory for evaluation       Ultrasound neck soft tissue on 5/10/2023:    FINDINGS:  RIGHT lobe: 6.1 x 1.6 x 1.7 cm. Heterogeneous with multiple nodules.  Isthmus: 3 mm. Single nodule.  LEFT lobe: 5.8 x 1.6 x 1.0 cm. Heterogeneous with multiple nodules.     NECK: No cervical lymphadenopathy.     NODULES:     Nodule 1: Lower right thyroid lobe nodule measuring 1.6 x 0.9 x 1.1  cm.   Composition: Spongiform, 0 points   Echogenicity: Hypoechoic, 2 points   Shape: Wider-than-tall, 0 points   Margin: Smooth, 0 points   Echogenic Foci: None, or large comet-tail artifacts, 0 points   Point Total: 1-2 points. TI-RADS 2. No FNA.      Nodule 2: Lower right thyroid lobe nodule measuring 1.0 x 0.8 x 0.6 cm  Composition: Solid or almost completely solid, 2 points   Echogenicity: Hypoechoic, 2 points   Shape: Wider-than-tall, 0 points   Margin: Smooth, 0 points   Echogenic Foci: Punctate echoic foci, 3 points   Point Total: 7 points or more. TI-RADS 5. If 1 cm or larger, recommend  FNA; if 0.5 cm or larger, follow up US annually for 5 years.     Nodule 3: Right isthmus nodule measuring 1.1 x 0.8 x 0.4 cm  Composition: Solid or almost completely solid, 2 points   Echogenicity: Hyperechoic or isoechoic, 1 point   Shape: Wider-than-tall, 0 points   Margin: Smooth, 0 points   Echogenic Foci: None, or large comet-tail artifacts, 0 points   Point Total: 3 points. TI-RADS 3. If 2.5 cm or larger, recommend FNA;  if 1.5 cm or larger, recommend follow up US at 1, 3, and 5 years.     Nodule 4: Lower left thyroid lobe nodule measuring 0.7 x 0.7 x 0.6 cm  Composition: Solid or almost completely solid, 2 points   Echogenicity: Hypoechoic, 2 points   Shape: Not taller  than wide, 0 points   Margin: Smooth, 0 points   Echogenic Foci: None, or large comet-tail artifacts, 0 points   Point Total: 4-6 points. TI-RADS 4. If 1.5 cm or larger, recommend  FNA; if 1 cm or larger, follow up US (annually for 5 years).                                                                      IMPRESSION:  1.  Multinodular thyroid with a 1.0 cm lower right thyroid lobe  TI-RADS 5 nodule with punctate echogenic foci. FNA is recommended.  2.  No cervical adenopathy.     Ultrasound neck soft tissue on 10/22/2024  - Pending read                              Again, thank you for allowing me to participate in the care of your patient.        Sincerely,        Lalitha Torres MD

## 2024-10-30 ENCOUNTER — APPOINTMENT (OUTPATIENT)
Dept: URBAN - METROPOLITAN AREA CLINIC 252 | Age: 44
Setting detail: DERMATOLOGY
End: 2024-10-30

## 2024-10-30 VITALS — HEIGHT: 68 IN | WEIGHT: 175 LBS

## 2024-10-30 DIAGNOSIS — L663 OTHER SPECIFIED DISEASES OF HAIR AND HAIR FOLLICLES: ICD-10-CM

## 2024-10-30 DIAGNOSIS — L72.0 EPIDERMAL CYST: ICD-10-CM

## 2024-10-30 DIAGNOSIS — L81.4 OTHER MELANIN HYPERPIGMENTATION: ICD-10-CM

## 2024-10-30 DIAGNOSIS — D22 MELANOCYTIC NEVI: ICD-10-CM

## 2024-10-30 DIAGNOSIS — L82.1 OTHER SEBORRHEIC KERATOSIS: ICD-10-CM

## 2024-10-30 DIAGNOSIS — L738 OTHER SPECIFIED DISEASES OF HAIR AND HAIR FOLLICLES: ICD-10-CM

## 2024-10-30 DIAGNOSIS — Z41.9 ENCOUNTER FOR PROCEDURE FOR PURPOSES OTHER THAN REMEDYING HEALTH STATE, UNSPECIFIED: ICD-10-CM

## 2024-10-30 DIAGNOSIS — Z71.89 OTHER SPECIFIED COUNSELING: ICD-10-CM

## 2024-10-30 PROBLEM — D22.39 MELANOCYTIC NEVI OF OTHER PARTS OF FACE: Status: ACTIVE | Noted: 2024-10-30

## 2024-10-30 PROBLEM — L02.12 FURUNCLE OF NECK: Status: ACTIVE | Noted: 2024-10-30

## 2024-10-30 PROBLEM — L02.222 FURUNCLE OF BACK [ANY PART, EXCEPT BUTTOCK]: Status: ACTIVE | Noted: 2024-10-30

## 2024-10-30 PROBLEM — D22.4 MELANOCYTIC NEVI OF SCALP AND NECK: Status: ACTIVE | Noted: 2024-10-30

## 2024-10-30 PROBLEM — L02.32 FURUNCLE OF BUTTOCK: Status: ACTIVE | Noted: 2024-10-30

## 2024-10-30 PROBLEM — D22.5 MELANOCYTIC NEVI OF TRUNK: Status: ACTIVE | Noted: 2024-10-30

## 2024-10-30 PROBLEM — D23.9 OTHER BENIGN NEOPLASM OF SKIN, UNSPECIFIED: Status: ACTIVE | Noted: 2024-10-30

## 2024-10-30 PROBLEM — D22.71 MELANOCYTIC NEVI OF RIGHT LOWER LIMB, INCLUDING HIP: Status: ACTIVE | Noted: 2024-10-30

## 2024-10-30 PROBLEM — D22.72 MELANOCYTIC NEVI OF LEFT LOWER LIMB, INCLUDING HIP: Status: ACTIVE | Noted: 2024-10-30

## 2024-10-30 PROCEDURE — 99213 OFFICE O/P EST LOW 20 MIN: CPT

## 2024-10-30 PROCEDURE — OTHER BOTOX: OTHER

## 2024-10-30 PROCEDURE — OTHER PRESCRIPTION: OTHER

## 2024-10-30 PROCEDURE — OTHER COUNSELING: OTHER

## 2024-10-30 RX ORDER — CLINDAMYCIN PHOSPHATE 10 MG/ML
SOLUTION TOPICAL BID
Qty: 60 | Refills: 11 | Status: ERX | COMMUNITY
Start: 2024-10-30

## 2024-10-30 ASSESSMENT — LOCATION SIMPLE DESCRIPTION DERM
LOCATION SIMPLE: LEFT POSTERIOR THIGH
LOCATION SIMPLE: LEFT POSTERIOR UPPER ARM
LOCATION SIMPLE: LEFT CHEEK
LOCATION SIMPLE: RIGHT BUTTOCK
LOCATION SIMPLE: RIGHT POSTERIOR THIGH
LOCATION SIMPLE: LEFT UPPER BACK
LOCATION SIMPLE: LEFT BUTTOCK
LOCATION SIMPLE: RIGHT THIGH
LOCATION SIMPLE: LEFT ANTERIOR NECK
LOCATION SIMPLE: RIGHT UPPER BACK
LOCATION SIMPLE: RIGHT POSTERIOR UPPER ARM
LOCATION SIMPLE: UPPER BACK
LOCATION SIMPLE: RIGHT LOWER BACK
LOCATION SIMPLE: LEFT LOWER BACK

## 2024-10-30 ASSESSMENT — LOCATION DETAILED DESCRIPTION DERM
LOCATION DETAILED: RIGHT SUPERIOR LATERAL MIDBACK
LOCATION DETAILED: RIGHT DISTAL POSTERIOR THIGH
LOCATION DETAILED: LEFT PROXIMAL POSTERIOR THIGH
LOCATION DETAILED: LEFT INFERIOR CENTRAL MALAR CHEEK
LOCATION DETAILED: RIGHT BUTTOCK
LOCATION DETAILED: LEFT BUTTOCK
LOCATION DETAILED: LEFT SUPERIOR LATERAL MIDBACK
LOCATION DETAILED: LEFT SUPERIOR LATERAL UPPER BACK
LOCATION DETAILED: LEFT MEDIAL UPPER BACK
LOCATION DETAILED: RIGHT INFERIOR UPPER BACK
LOCATION DETAILED: LEFT INFERIOR LATERAL MIDBACK
LOCATION DETAILED: RIGHT ANTERIOR PROXIMAL THIGH
LOCATION DETAILED: RIGHT PROXIMAL POSTERIOR UPPER ARM
LOCATION DETAILED: LEFT CLAVICULAR NECK
LOCATION DETAILED: LEFT INFERIOR LATERAL NECK
LOCATION DETAILED: SUPERIOR THORACIC SPINE
LOCATION DETAILED: LEFT CENTRAL MALAR CHEEK
LOCATION DETAILED: LEFT DISTAL POSTERIOR UPPER ARM

## 2024-10-30 ASSESSMENT — LOCATION ZONE DERM
LOCATION ZONE: LEG
LOCATION ZONE: NECK
LOCATION ZONE: FACE
LOCATION ZONE: TRUNK
LOCATION ZONE: ARM

## 2024-10-30 NOTE — PROCEDURE: COUNSELING
Detail Level: Generalized
Detail Level: Zone
Detail Level: Detailed
Detail Level: Simple
Patient Specific Counseling (Will Not Stick From Patient To Patient): - Discussed these spots may be treated cosmetically in the future using electrocautery or liquid nitrogen.
Patient Specific Counseling (Will Not Stick From Patient To Patient): - Discussed options of laser therapy vs. prescription.

## 2024-10-30 NOTE — PROCEDURE: BOTOX
Detail Level: Detailed
Post-Care Instructions: Patient instructed to not lie down for 4 hours and limit physical activity for 24 hours.
Price (Use Numbers Only, No Special Characters Or $): 842
Map Statement: Please see attached map for locations and injection amounts.
Lot #: [id number]
Expiration Date (Month Year): 2025/09
Consent: COLOR KEY FOR DIAGRAM BELOW:\\nRed = 1/2 unit of Jeuveau per injection\\nOrange = 1 units of Jeuveau per injection\\nYellow = 2 units of Jeuveau per injection\\nGreen = 3 units of Jeuveau per injection\\nBlue = 4 units of Jeuveau per injection\\nBrown = 5 units of Jeuveau per injection\\n\\nWritten consent obtained. Risks include but not limited to lid/brow ptosis, bruising, swelling, diplopia, temporary effect, incomplete chemical denervation.
Total Units: 30
Show Inventory Tab: Show

## 2024-11-01 RX ORDER — SUMATRIPTAN 20 MG/1
1 SPRAY NASAL PRN
COMMUNITY
Start: 2024-09-24

## 2024-11-06 RX ORDER — ONDANSETRON 4 MG/1
4 TABLET, ORALLY DISINTEGRATING ORAL EVERY 6 HOURS PRN
Status: CANCELLED | OUTPATIENT
Start: 2024-11-06

## 2024-11-06 RX ORDER — NALOXONE HYDROCHLORIDE 0.4 MG/ML
0.2 INJECTION, SOLUTION INTRAMUSCULAR; INTRAVENOUS; SUBCUTANEOUS
Status: CANCELLED | OUTPATIENT
Start: 2024-11-06

## 2024-11-06 RX ORDER — FLUMAZENIL 0.1 MG/ML
0.2 INJECTION, SOLUTION INTRAVENOUS
Status: CANCELLED | OUTPATIENT
Start: 2024-11-06 | End: 2024-11-07

## 2024-11-06 RX ORDER — PROCHLORPERAZINE MALEATE 5 MG/1
10 TABLET ORAL EVERY 6 HOURS PRN
Status: CANCELLED | OUTPATIENT
Start: 2024-11-06

## 2024-11-06 RX ORDER — NALOXONE HYDROCHLORIDE 0.4 MG/ML
0.4 INJECTION, SOLUTION INTRAMUSCULAR; INTRAVENOUS; SUBCUTANEOUS
Status: CANCELLED | OUTPATIENT
Start: 2024-11-06

## 2024-11-06 RX ORDER — ONDANSETRON 2 MG/ML
4 INJECTION INTRAMUSCULAR; INTRAVENOUS EVERY 6 HOURS PRN
Status: CANCELLED | OUTPATIENT
Start: 2024-11-06

## 2024-11-07 ENCOUNTER — ANESTHESIA (OUTPATIENT)
Dept: GASTROENTEROLOGY | Facility: CLINIC | Age: 44
End: 2024-11-07
Payer: COMMERCIAL

## 2024-11-07 ENCOUNTER — HOSPITAL ENCOUNTER (OUTPATIENT)
Facility: CLINIC | Age: 44
Discharge: HOME | End: 2024-11-07
Attending: SURGERY | Admitting: SURGERY
Payer: COMMERCIAL

## 2024-11-07 ENCOUNTER — ANESTHESIA EVENT (OUTPATIENT)
Dept: GASTROENTEROLOGY | Facility: CLINIC | Age: 44
End: 2024-11-07
Payer: COMMERCIAL

## 2024-11-07 VITALS
TEMPERATURE: 98.3 F | DIASTOLIC BLOOD PRESSURE: 80 MMHG | OXYGEN SATURATION: 100 % | HEART RATE: 86 BPM | RESPIRATION RATE: 16 BRPM | WEIGHT: 174 LBS | BODY MASS INDEX: 26.68 KG/M2 | SYSTOLIC BLOOD PRESSURE: 121 MMHG

## 2024-11-07 LAB — COLONOSCOPY: NORMAL

## 2024-11-07 PROCEDURE — 250N000009 HC RX 250: Performed by: NURSE ANESTHETIST, CERTIFIED REGISTERED

## 2024-11-07 PROCEDURE — 370N000017 HC ANESTHESIA TECHNICAL FEE, PER MIN: Performed by: SURGERY

## 2024-11-07 PROCEDURE — 250N000011 HC RX IP 250 OP 636: Performed by: NURSE ANESTHETIST, CERTIFIED REGISTERED

## 2024-11-07 PROCEDURE — 45378 DIAGNOSTIC COLONOSCOPY: CPT | Performed by: SURGERY

## 2024-11-07 PROCEDURE — G0121 COLON CA SCRN NOT HI RSK IND: HCPCS | Performed by: SURGERY

## 2024-11-07 RX ORDER — PROPOFOL 10 MG/ML
INJECTION, EMULSION INTRAVENOUS PRN
Status: DISCONTINUED | OUTPATIENT
Start: 2024-11-07 | End: 2024-11-07

## 2024-11-07 RX ORDER — LIDOCAINE 40 MG/G
CREAM TOPICAL
Status: DISCONTINUED | OUTPATIENT
Start: 2024-11-07 | End: 2024-11-07 | Stop reason: HOSPADM

## 2024-11-07 RX ORDER — ONDANSETRON 2 MG/ML
4 INJECTION INTRAMUSCULAR; INTRAVENOUS
Status: DISCONTINUED | OUTPATIENT
Start: 2024-11-07 | End: 2024-11-07 | Stop reason: HOSPADM

## 2024-11-07 RX ORDER — PROPOFOL 10 MG/ML
INJECTION, EMULSION INTRAVENOUS CONTINUOUS PRN
Status: DISCONTINUED | OUTPATIENT
Start: 2024-11-07 | End: 2024-11-07

## 2024-11-07 RX ORDER — LIDOCAINE HYDROCHLORIDE 10 MG/ML
INJECTION, SOLUTION INFILTRATION; PERINEURAL PRN
Status: DISCONTINUED | OUTPATIENT
Start: 2024-11-07 | End: 2024-11-07

## 2024-11-07 RX ADMIN — PROPOFOL 50 MG: 10 INJECTION, EMULSION INTRAVENOUS at 13:49

## 2024-11-07 RX ADMIN — PROPOFOL 50 MG: 10 INJECTION, EMULSION INTRAVENOUS at 13:50

## 2024-11-07 RX ADMIN — LIDOCAINE HYDROCHLORIDE 50 MG: 10 INJECTION, SOLUTION INFILTRATION; PERINEURAL at 13:49

## 2024-11-07 RX ADMIN — PROPOFOL 200 MCG/KG/MIN: 10 INJECTION, EMULSION INTRAVENOUS at 13:50

## 2024-11-07 ASSESSMENT — ACTIVITIES OF DAILY LIVING (ADL)
ADLS_ACUITY_SCORE: 0
ADLS_ACUITY_SCORE: 0

## 2024-11-07 NOTE — H&P
Patient seen for Endoscopy    HPI:  Patient is a 44 year old female here for endoscopy. Not taking blood thinning medications. No MI or CVA history. No issues with previous sedation. No recent acute illness.    Review Of Systems    Skin: negative  Ears/Nose/Throat: negative  Respiratory: No shortness of breath, dyspnea on exertion, cough, or hemoptysis  Cardiovascular: negative  Gastrointestinal: negative  Genitourinary: negative  Musculoskeletal: negative  Neurologic: negative  Hematologic/Lymphatic/Immunologic: negative  Endocrine: negative      Past Medical History:   Diagnosis Date    Anemia     Anxiety     Cervical dysplasia     Constipation     Migraine     Pneumonia     current cold       Past Surgical History:   Procedure Laterality Date    CONIZATION LEEP      DILATION AND CURETTAGE, HYSTEROSCOPY DIAGNOSTIC, COMBINED      DILATION AND CURETTAGE, HYSTEROSCOPY, ABLATE ENDOMETRIUM NOVASURE, COMBINED N/A 03/09/2017    Procedure: COMBINED DILATION AND CURETTAGE, HYSTEROSCOPY, ABLATE ENDOMETRIUM NOVASURE;  Surgeon: Caitlin House MD;  Location: [facility]    HEAD & NECK SURGERY      WISDOM TEETH EXTRACTED    HYSTERECTOMY, PAP NO LONGER INDICATED      LAPAROSCOPIC CYSTECTOMY OVARIAN (BENIGN) Right     1st trimester    LAPAROSCOPY DIAGNOSTIC (GYN)      WITH RIGHT SALPINGECTOMY, LAPAROSCOPY WITH JEM,        History reviewed. No pertinent family history.    Social History     Socioeconomic History    Marital status:      Spouse name: Not on file    Number of children: Not on file    Years of education: Not on file    Highest education level: Not on file   Occupational History    Not on file   Tobacco Use    Smoking status: Never    Smokeless tobacco: Never   Vaping Use    Vaping status: Never Used   Substance and Sexual Activity    Alcohol use: No    Drug use: No    Sexual activity: Not on file   Other Topics Concern    Parent/sibling w/ CABG, MI or angioplasty before 65F 55M? Not Asked   Social History  Narrative    Not on file     Social Drivers of Health     Financial Resource Strain: Low Risk  (9/10/2024)    Financial Resource Strain     Within the past 12 months, have you or your family members you live with been unable to get utilities (heat, electricity) when it was really needed?: No   Food Insecurity: Low Risk  (9/10/2024)    Food Insecurity     Within the past 12 months, did you worry that your food would run out before you got money to buy more?: No     Within the past 12 months, did the food you bought just not last and you didn t have money to get more?: No   Transportation Needs: Low Risk  (9/10/2024)    Transportation Needs     Within the past 12 months, has lack of transportation kept you from medical appointments, getting your medicines, non-medical meetings or appointments, work, or from getting things that you need?: No   Physical Activity: Unknown (9/10/2024)    Exercise Vital Sign     Days of Exercise per Week: 3 days     Minutes of Exercise per Session: Not on file   Stress: Stress Concern Present (9/10/2024)    [employer] - Occupational Stress Questionnaire     Feeling of Stress : To some extent   Social Connections: Unknown (9/10/2024)    Social Connection and Isolation Panel [NHANES]     Frequency of Communication with Friends and Family: Not on file     Frequency of Social Gatherings with Friends and Family: Once a week     Attends [employer] Services: Not on file     Active Member of Clubs or Organizations: Not on file     Attends Club or Organization Meetings: Not on file     Marital Status: Not on file   Interpersonal Safety: Low Risk  (11/7/2024)    Interpersonal Safety     Do you feel physically and emotionally safe where you currently live?: Yes     Within the past 12 months, have you been hit, slapped, kicked or otherwise physically hurt by someone?: No     Within the past 12 months, have you been humiliated or emotionally abused in other ways by your  partner or ex-partner?: No   Housing Stability: Low Risk  (9/10/2024)    Housing Stability     Do you have housing? : Yes     Are you worried about losing your housing?: No       No current outpatient medications on file.       Medications and history reviewed    Physical exam:  Vitals: /88 (Cuff Size: Adult Regular)   Pulse 102   Temp 98.3  F (36.8  C) (Temporal)   Wt 78.9 kg (174 lb)   LMP 11/20/2019 (Exact Date)   SpO2 100%   BMI 26.68 kg/m    BMI= Body mass index is 26.68 kg/m .    Constitutional: Healthy, alert, non-distressed   Head: Normo-cephalic, atraumatic, no lesions, masses or tenderness   Cardiovascular: RRR, no new murmurs, +S1, +S2 heart sounds, no clicks, rubs or gallops   Respiratory: CTAB, no rales, rhonchi or wheezing, equal chest rise, good respiratory effort   Gastrointestinal: Soft, non-tender, non distended, no rebound rigidity or guarding, no masses or hernias palpated   : Deferred  Musculoskeletal: Moves all extremities, normal  strength, no deformities noted   Skin: No suspicious lesions or rashes   Psychiatric: Mentation appears normal, affect appropriate   Hematologic/Lymphatic/Immunologic: Normal cervical and supraclavicular lymph nodes   Patient able to get up on table without difficulty.    Labs show:  No results found for this or any previous visit (from the past 24 hours).    Assessment: Endoscopy  Plan: Pt cleared for anesthesia for proposed procedure.    Tate Lara,

## 2024-11-07 NOTE — ANESTHESIA PREPROCEDURE EVALUATION
Anesthesia Pre-Procedure Evaluation    Patient: Vikki Merlos   MRN: [id number] : 1980        Procedure : Procedure(s):  Colonoscopy          Past Medical History:   Diagnosis Date    Anemia     Anxiety     Cervical dysplasia     Constipation     Migraine     Pneumonia     current cold      Past Surgical History:   Procedure Laterality Date    CONIZATION LEEP      DILATION AND CURETTAGE, HYSTEROSCOPY DIAGNOSTIC, COMBINED      DILATION AND CURETTAGE, HYSTEROSCOPY, ABLATE ENDOMETRIUM NOVASURE, COMBINED N/A 2017    Procedure: COMBINED DILATION AND CURETTAGE, HYSTEROSCOPY, ABLATE ENDOMETRIUM NOVASURE;  Surgeon: Caitlin House MD;  Location: [facility]    HEAD & NECK SURGERY      WISDOM TEETH EXTRACTED    HYSTERECTOMY, PAP NO LONGER INDICATED      LAPAROSCOPIC CYSTECTOMY OVARIAN (BENIGN) Right     1st trimester    LAPAROSCOPY DIAGNOSTIC (GYN)      WITH RIGHT SALPINGECTOMY, LAPAROSCOPY WITH JEM,       Allergies   Allergen Reactions    Fremanezumab Hives and Itching      Social History     Tobacco Use    Smoking status: Never    Smokeless tobacco: Never   Substance Use Topics    Alcohol use: No      Wt Readings from Last 1 Encounters:   10/11/24 78.9 kg (174 lb)        Anesthesia Evaluation            ROS/MED HX  ENT/Pulmonary:       Neurologic:     (+)      migraines,                          Cardiovascular:  - neg cardiovascular ROS     METS/Exercise Tolerance:     Hematologic:  - neg hematologic  ROS     Musculoskeletal:  - neg musculoskeletal ROS     GI/Hepatic:     (+)        bowel prep,            Renal/Genitourinary:  - neg Renal ROS     Endo:  - neg endo ROS     Psychiatric/Substance Use:     (+) psychiatric history anxiety       Infectious Disease:  - neg infectious disease ROS     Malignancy:  - neg malignancy ROS     Other:  - neg other ROS          Physical Exam    Airway  airway exam normal      Mallampati: I   TM distance: > 3 FB   Neck ROM: full   Mouth opening: > 3 cm    Respiratory  "Devices and Support         Dental           Cardiovascular   cardiovascular exam normal       Rhythm and rate: regular and normal     Pulmonary   pulmonary exam normal        breath sounds clear to auscultation           OUTSIDE LABS:  CBC:   Lab Results   Component Value Date    WBC 6.6 09/10/2024    WBC 3.6 (L) 05/10/2023    WBC 3.6 (L) 05/10/2023    HGB 13.0 09/10/2024    HGB 13.0 05/10/2023    HGB 13.0 05/10/2023    HCT 38.6 09/10/2024    HCT 40.3 05/10/2023    HCT 40.0 05/10/2023     09/10/2024     05/10/2023     05/10/2023     BMP:   Lab Results   Component Value Date     09/10/2024     12/07/2019    POTASSIUM 4.1 09/10/2024    POTASSIUM 4.0 12/07/2019    CHLORIDE 101 09/10/2024    CHLORIDE 108 12/07/2019    CO2 26 09/10/2024    CO2 28 12/07/2019    BUN 15.0 09/10/2024    BUN 12 12/07/2019    CR 0.80 09/10/2024    CR 0.80 12/07/2019    GLC 99 09/10/2024    GLC 94 12/07/2019     COAGS: No results found for: \"PTT\", \"INR\", \"FIBR\"  POC:   Lab Results   Component Value Date    HCG Negative 12/07/2019    HCGS Negative 03/09/2017     HEPATIC:   Lab Results   Component Value Date    ALBUMIN 4.6 09/10/2024    PROTTOTAL 7.6 09/10/2024    ALT 17 09/10/2024    AST 24 09/10/2024    ALKPHOS 67 09/10/2024    BILITOTAL 0.3 09/10/2024     OTHER:   Lab Results   Component Value Date    LACT 1.9 03/13/2017    STAN 9.6 09/10/2024    LIPASE 134 12/07/2019    TSH 0.64 09/10/2024    CRP 3.0 12/07/2019       Anesthesia Plan    ASA Status:  1    NPO Status:  NPO Appropriate    Anesthesia Type: MAC.     - Reason for MAC: straight local not clinically adequate   Induction: Intravenous, Propofol.   Maintenance: TIVA.        Consents    Anesthesia Plan(s) and associated risks, benefits, and realistic alternatives discussed. Questions answered and patient/representative(s) expressed understanding.     - Discussed:     - Discussed with:  Patient       Use of blood products discussed: No .     Postoperative " Care       PONV prophylaxis: Background Propofol Infusion     Comments:    Other Comments: The risks and benefits of anesthesia, and the alternatives where applicable, have been discussed with the patient, and they wish to proceed.              FIONA Duong CRNA    I have reviewed the pertinent notes and labs in the chart from the past 30 days and (re)examined the patient.  Any updates or changes from those notes are reflected in this note.

## 2024-11-07 NOTE — DISCHARGE INSTRUCTIONS
[facility]    Home Care Following Endoscopy          Activity:  You have just undergone an endoscopic procedure usually performed with conscious sedation.  Do not work or operate machinery (including a car) for at least 12 hours.    I encourage you to walk and attempt to pass this air as soon as possible.    Diet:  Return to the diet you were on before your procedure but eat lightly for the first 12-24 hours.  Drink plenty of water.  Resume any regular medications unless otherwise advised by your physician.  Please begin any new medication prescribed as a result of your procedure as directed by your physician.     Pain:  You may take Tylenol as needed for pain.  Expected Recovery:  You can expect some mild abdominal fullness and/or discomfort due to the air used to inflate your intestinal tract.  Call Your Physician if You Have:    After Colonoscopy:  Worsening persisting abdominal pain which is worse with activity.  Fevers (>101 degrees F), chills or shakes.  Passage of continued blood with bowel movements.     Any questions or concerns about your recovery, please call [phone] or after hours [phone] ([phone]) Nurse Advice Line.

## 2024-11-07 NOTE — ANESTHESIA POSTPROCEDURE EVALUATION
Patient: Vikki Merlos    Procedure: Procedure(s):  Colonoscopy       Anesthesia Type:  MAC    Note:  Disposition: Outpatient   Postop Pain Control: Uneventful            Sign Out: Well controlled pain   PONV: No   Neuro/Psych: Uneventful            Sign Out: Acceptable/Baseline neuro status   Airway/Respiratory: Uneventful            Sign Out: Acceptable/Baseline resp. status   CV/Hemodynamics: Uneventful            Sign Out: Acceptable CV status   Other NRE: NONE   DID A NON-ROUTINE EVENT OCCUR? No    Event details/Postop Comments:  Pt was happy with anesthesia care.  No complications.  I will follow up with the pt if needed.           Last vitals:  Vitals Value Taken Time   /84 11/07/24 1425   Temp     Pulse 82 11/07/24 1420   Resp 16 11/07/24 1410   SpO2 100 % 11/07/24 1415   Vitals shown include unfiled device data.    Electronically Signed By: FIONA Fontenot CRNA  November 7, 2024  2:28 PM

## 2024-11-07 NOTE — ANESTHESIA CARE TRANSFER NOTE
Patient: Vikki Merlos    Procedure: Procedure(s):  Colonoscopy       Diagnosis: Screen for colon cancer [Z12.11]  Diagnosis Additional Information: No value filed.    Anesthesia Type:   MAC     Note:    Oropharynx: oropharynx clear of all foreign objects and spontaneously breathing  Level of Consciousness: drowsy  Oxygen Supplementation: face mask    Independent Airway: airway patency satisfactory and stable  Dentition: dentition unchanged  Vital Signs Stable: post-procedure vital signs reviewed and stable  Report to RN Given: handoff report given  Patient transferred to: Phase II    Handoff Report: Identifed the Patient, Identified the Reponsible Provider, Reviewed the pertinent medical history, Discussed the surgical course, Reviewed Intra-OP anesthesia mangement and issues during anesthesia, Set expectations for post-procedure period and Allowed opportunity for questions and acknowledgement of understanding      Vitals:  Vitals Value Taken Time   /84 11/07/24 1425   Temp     Pulse 82 11/07/24 1420   Resp 16 11/07/24 1410   SpO2 100 % 11/07/24 1415   Vitals shown include unfiled device data.    Electronically Signed By: FIONA Fontenot CRNA  November 7, 2024  2:27 PM

## 2025-02-11 ENCOUNTER — APPOINTMENT (OUTPATIENT)
Dept: URBAN - METROPOLITAN AREA CLINIC 252 | Age: 45
Setting detail: DERMATOLOGY
End: 2025-02-11

## 2025-02-11 DIAGNOSIS — L82.1 OTHER SEBORRHEIC KERATOSIS: ICD-10-CM

## 2025-02-11 DIAGNOSIS — L57.8 OTHER SKIN CHANGES DUE TO CHRONIC EXPOSURE TO NONIONIZING RADIATION: ICD-10-CM

## 2025-02-11 PROCEDURE — OTHER BENIGN DESTRUCTION COSMETIC: OTHER

## 2025-02-11 PROCEDURE — OTHER COUNSELING: OTHER

## 2025-02-11 PROCEDURE — OTHER PRESCRIPTION: OTHER

## 2025-02-11 RX ORDER — TRETINOIN 0.5 MG/G
CREAM TOPICAL QD
Qty: 45 | Refills: 2 | Status: ERX | COMMUNITY
Start: 2025-02-11

## 2025-02-11 ASSESSMENT — LOCATION DETAILED DESCRIPTION DERM
LOCATION DETAILED: RIGHT LATERAL SUPERIOR CHEST
LOCATION DETAILED: LEFT POSTERIOR SHOULDER
LOCATION DETAILED: RIGHT MEDIAL SUPERIOR CHEST
LOCATION DETAILED: LEFT INFERIOR LATERAL NECK
LOCATION DETAILED: LEFT SUPERIOR ANTERIOR NECK
LOCATION DETAILED: RIGHT POSTERIOR SHOULDER
LOCATION DETAILED: LEFT LATERAL SUPERIOR CHEST
LOCATION DETAILED: LEFT MEDIAL SUPERIOR CHEST
LOCATION DETAILED: RIGHT INFERIOR ANTERIOR NECK
LOCATION DETAILED: LEFT INFERIOR ANTERIOR NECK
LOCATION DETAILED: RIGHT INFERIOR LATERAL NECK
LOCATION DETAILED: LEFT INFERIOR UPPER BACK
LOCATION DETAILED: RIGHT SUPERIOR ANTERIOR NECK
LOCATION DETAILED: RIGHT SUPERIOR MEDIAL UPPER BACK

## 2025-02-11 ASSESSMENT — LOCATION SIMPLE DESCRIPTION DERM
LOCATION SIMPLE: RIGHT SHOULDER
LOCATION SIMPLE: LEFT ANTERIOR NECK
LOCATION SIMPLE: RIGHT ANTERIOR NECK
LOCATION SIMPLE: LEFT SHOULDER
LOCATION SIMPLE: RIGHT UPPER BACK
LOCATION SIMPLE: LEFT UPPER BACK
LOCATION SIMPLE: CHEST

## 2025-02-11 ASSESSMENT — LOCATION ZONE DERM
LOCATION ZONE: NECK
LOCATION ZONE: TRUNK
LOCATION ZONE: ARM

## 2025-02-11 NOTE — PROCEDURE: BENIGN DESTRUCTION COSMETIC
Anesthesia Volume In Cc: 0
Topical Anesthesia?: 20% benzocaine, 10% lidocaine, 10% tetracaine
Post-Care Instructions: I reviewed with the patient in detail post-care instructions. Patient is to wear sunprotection, and avoid picking at any of the treated lesions. Pt may apply Vaseline to crusted or scabbing areas.
Detail Level: Detailed
Consent: The patient's consent was obtained including but not limited to risks of crusting, scabbing, blistering, scarring, darker or lighter pigmentary change, recurrence, incomplete removal and infection.
Price (Use Numbers Only, No Special Characters Or $): 300

## 2025-03-03 ENCOUNTER — APPOINTMENT (OUTPATIENT)
Dept: URBAN - METROPOLITAN AREA CLINIC 254 | Age: 45
Setting detail: DERMATOLOGY
End: 2025-03-05

## 2025-03-03 ENCOUNTER — APPOINTMENT (OUTPATIENT)
Dept: URBAN - METROPOLITAN AREA CLINIC 252 | Age: 45
Setting detail: DERMATOLOGY
End: 2025-03-03

## 2025-03-03 DIAGNOSIS — Z41.9 ENCOUNTER FOR PROCEDURE FOR PURPOSES OTHER THAN REMEDYING HEALTH STATE, UNSPECIFIED: ICD-10-CM

## 2025-03-03 PROCEDURE — OTHER INVENTORY: OTHER

## 2025-03-03 PROCEDURE — OTHER BOTOX: OTHER

## 2025-03-03 NOTE — HPI: COSMETIC (BOTOX)
previous_has_had_botox
Additional History: Pt would like the same amount of units as last Botox appt. Pt is particularly concerned about the glabella.
When Was Your Last Botox Treatment?: 10/2024

## 2025-03-03 NOTE — PROCEDURE: BOTOX
Masseter Units: 0
Nasal Root Units: 3
Periorbital Skin Units: 9
Show Masseter Units: Yes
Show Lcl Units: No
Detail Level: Detailed
Patient Specific Comments (Will Not Stick From Patient To Patient): See Botox map in attachments for reference
Consent: Written consent obtained. Risks include but not limited to lid/brow ptosis, bruising, swelling, diplopia, temporary effect, incomplete chemical denervation. 
Glabellar Complex Units: 11
Show Inventory Tab: Hide
Dilution (U/0.1 Cc): 10
Incrementing Botox Units: By 0.5 Units
Forehead Units: 7
Post-Care Instructions: Patient instructed to not lie down for 4 hours and limit physical activity for 24 hours.

## 2025-03-25 ENCOUNTER — ANCILLARY ORDERS (OUTPATIENT)
Dept: MAMMOGRAPHY | Facility: CLINIC | Age: 45
End: 2025-03-25
Payer: COMMERCIAL

## 2025-03-25 DIAGNOSIS — Z12.31 VISIT FOR SCREENING MAMMOGRAM: Primary | ICD-10-CM

## 2025-08-11 ENCOUNTER — PATIENT OUTREACH (OUTPATIENT)
Dept: CARE COORDINATION | Facility: CLINIC | Age: 45
End: 2025-08-11
Payer: COMMERCIAL

## (undated) DEVICE — CURETTE SUCTION PIPELLE ENDOMETRIAL 3.1MMX23.5CM  8200

## (undated) DEVICE — SOL WATER IRRIG 1000ML BOTTLE 2F7114

## (undated) DEVICE — SOL NACL 0.9% INJ 1000ML BAG 2B1324X

## (undated) DEVICE — SUCTION CANISTER MEDIVAC LINER 3000ML W/LID 65651-530

## (undated) DEVICE — CATH INTERMITTENT CLEAN-CATH FEMALE 14FR 6" VINYL LF 420614

## (undated) DEVICE — SOL NACL 0.9% IRRIG 1000ML BOTTLE 07138-09

## (undated) DEVICE — ESU ABLATION NOVASURE W/SURESOUND NS2007

## (undated) DEVICE — KIT ENDO TURNOVER/PROCEDURE CARRY-ON 101822

## (undated) DEVICE — TUBING SUCTION 6"X3/16" N56A

## (undated) DEVICE — GLOVE PROTEXIS POWDER FREE 7.0 ORTHOPEDIC 2D73ET70

## (undated) DEVICE — TUBING IRRIG TUR Y TYPE 96" LF 6543-01

## (undated) DEVICE — PACK TVT HYSTEROSCOPY SMA15HYFSE

## (undated) DEVICE — LINEN TOWEL PACK X5 5464

## (undated) RX ORDER — FENTANYL CITRATE 50 UG/ML
INJECTION, SOLUTION INTRAMUSCULAR; INTRAVENOUS
Status: DISPENSED
Start: 2017-03-09

## (undated) RX ORDER — PROPOFOL 10 MG/ML
INJECTION, EMULSION INTRAVENOUS
Status: DISPENSED
Start: 2017-03-09

## (undated) RX ORDER — PROPOFOL 10 MG/ML
INJECTION, EMULSION INTRAVENOUS
Status: DISPENSED
Start: 2024-11-07

## (undated) RX ORDER — KETOROLAC TROMETHAMINE 30 MG/ML
INJECTION, SOLUTION INTRAMUSCULAR; INTRAVENOUS
Status: DISPENSED
Start: 2017-03-09

## (undated) RX ORDER — HYDROMORPHONE HYDROCHLORIDE 1 MG/ML
INJECTION, SOLUTION INTRAMUSCULAR; INTRAVENOUS; SUBCUTANEOUS
Status: DISPENSED
Start: 2017-03-09

## (undated) RX ORDER — LIDOCAINE HYDROCHLORIDE 20 MG/ML
INJECTION, SOLUTION EPIDURAL; INFILTRATION; INTRACAUDAL; PERINEURAL
Status: DISPENSED
Start: 2017-03-09

## (undated) RX ORDER — DEXAMETHASONE SODIUM PHOSPHATE 4 MG/ML
INJECTION, SOLUTION INTRA-ARTICULAR; INTRALESIONAL; INTRAMUSCULAR; INTRAVENOUS; SOFT TISSUE
Status: DISPENSED
Start: 2017-03-09

## (undated) RX ORDER — OXYCODONE HYDROCHLORIDE 5 MG/1
TABLET ORAL
Status: DISPENSED
Start: 2017-03-09

## (undated) RX ORDER — LIDOCAINE HYDROCHLORIDE 10 MG/ML
INJECTION, SOLUTION EPIDURAL; INFILTRATION; INTRACAUDAL; PERINEURAL
Status: DISPENSED
Start: 2024-11-07

## (undated) RX ORDER — ONDANSETRON 2 MG/ML
INJECTION INTRAMUSCULAR; INTRAVENOUS
Status: DISPENSED
Start: 2017-03-09